# Patient Record
Sex: MALE | Race: WHITE | Employment: FULL TIME | URBAN - NONMETROPOLITAN AREA
[De-identification: names, ages, dates, MRNs, and addresses within clinical notes are randomized per-mention and may not be internally consistent; named-entity substitution may affect disease eponyms.]

---

## 2018-07-26 ENCOUNTER — HOSPITAL ENCOUNTER (INPATIENT)
Age: 53
LOS: 2 days | Discharge: HOME | DRG: 603 | End: 2018-07-28
Attending: FAMILY MEDICINE | Admitting: INTERNAL MEDICINE

## 2018-07-26 ENCOUNTER — APPOINTMENT (OUTPATIENT)
Dept: GENERAL RADIOLOGY | Age: 53
DRG: 603 | End: 2018-07-26

## 2018-07-26 ENCOUNTER — APPOINTMENT (OUTPATIENT)
Dept: INTERVENTIONAL RADIOLOGY/VASCULAR | Age: 53
DRG: 603 | End: 2018-07-26

## 2018-07-26 DIAGNOSIS — L03.90 CELLULITIS, UNSPECIFIED CELLULITIS SITE: Primary | ICD-10-CM

## 2018-07-26 PROBLEM — Z79.01 CHRONIC ANTICOAGULATION: Status: ACTIVE | Noted: 2018-07-26

## 2018-07-26 PROBLEM — Z95.828 PRESENCE OF IVC FILTER: Status: ACTIVE | Noted: 2018-07-26

## 2018-07-26 PROBLEM — I10 HTN (HYPERTENSION): Status: ACTIVE | Noted: 2018-07-26

## 2018-07-26 PROBLEM — R21 RASH: Status: ACTIVE | Noted: 2018-07-26

## 2018-07-26 LAB
ALBUMIN SERPL-MCNC: 3.9 G/DL (ref 3.5–5.1)
ALP BLD-CCNC: 38 U/L (ref 38–126)
ALT SERPL-CCNC: 24 U/L (ref 11–66)
AMPHETAMINE+METHAMPHETAMINE URINE SCREEN: NEGATIVE
ANION GAP SERPL CALCULATED.3IONS-SCNC: 13 MEQ/L (ref 8–16)
AST SERPL-CCNC: 21 U/L (ref 5–40)
BACTERIA: ABNORMAL
BARBITURATE QUANTITATIVE URINE: NEGATIVE
BASOPHILS # BLD: 0.7 %
BASOPHILS ABSOLUTE: 0 THOU/MM3 (ref 0–0.1)
BENZODIAZEPINE QUANTITATIVE URINE: NEGATIVE
BILIRUB SERPL-MCNC: 0.4 MG/DL (ref 0.3–1.2)
BILIRUBIN DIRECT: < 0.2 MG/DL (ref 0–0.3)
BILIRUBIN URINE: NEGATIVE
BLOOD, URINE: NEGATIVE
BUN BLDV-MCNC: 13 MG/DL (ref 7–22)
C-REACTIVE PROTEIN: 6.59 MG/DL (ref 0–1)
CALCIUM SERPL-MCNC: 9.6 MG/DL (ref 8.5–10.5)
CANNABINOID QUANTITATIVE URINE: NEGATIVE
CASTS: ABNORMAL /LPF
CASTS: ABNORMAL /LPF
CHARACTER, URINE: ABNORMAL
CHLORIDE BLD-SCNC: 100 MEQ/L (ref 98–111)
CO2: 28 MEQ/L (ref 23–33)
COCAINE METABOLITE QUANTITATIVE URINE: NEGATIVE
COLOR: YELLOW
CREAT SERPL-MCNC: 0.9 MG/DL (ref 0.4–1.2)
CRYSTALS: ABNORMAL
EOSINOPHIL # BLD: 6.1 %
EOSINOPHILS ABSOLUTE: 0.4 THOU/MM3 (ref 0–0.4)
EPITHELIAL CELLS, UA: ABNORMAL /HPF
ERYTHROCYTE [DISTWIDTH] IN BLOOD BY AUTOMATED COUNT: 12.1 % (ref 11.5–14.5)
ERYTHROCYTE [DISTWIDTH] IN BLOOD BY AUTOMATED COUNT: 39.1 FL (ref 35–45)
GFR SERPL CREATININE-BSD FRML MDRD: 88 ML/MIN/1.73M2
GLUCOSE BLD-MCNC: 147 MG/DL (ref 70–108)
GLUCOSE, URINE: 100 MG/DL
HAV IGM SER IA-ACNC: NEGATIVE
HCT VFR BLD CALC: 45.2 % (ref 42–52)
HEMOGLOBIN: 15.3 GM/DL (ref 14–18)
HEPATITIS B CORE IGM ANTIBODY: NEGATIVE
HEPATITIS B SURFACE ANTIGEN: NEGATIVE
HEPATITIS C ANTIBODY: NEGATIVE
IMMATURE GRANS (ABS): 0.01 THOU/MM3 (ref 0–0.07)
IMMATURE GRANULOCYTES: 0.2 %
INR BLD: 3.48 (ref 0.85–1.13)
KETONES, URINE: NEGATIVE
LACTIC ACID, SEPSIS: 1.7 MMOL/L (ref 0.5–1.9)
LEUKOCYTE EST, POC: NEGATIVE
LYMPHOCYTES # BLD: 18.7 %
LYMPHOCYTES ABSOLUTE: 1.1 THOU/MM3 (ref 1–4.8)
MCH RBC QN AUTO: 30 PG (ref 26–33)
MCHC RBC AUTO-ENTMCNC: 33.8 GM/DL (ref 32.2–35.5)
MCV RBC AUTO: 88.6 FL (ref 80–94)
MISCELLANEOUS LAB TEST RESULT: ABNORMAL
MONOCYTES # BLD: 6.6 %
MONOCYTES ABSOLUTE: 0.4 THOU/MM3 (ref 0.4–1.3)
NITRITE, URINE: NEGATIVE
NUCLEATED RED BLOOD CELLS: 0 /100 WBC
OPIATES, URINE: POSITIVE
OSMOLALITY CALCULATION: 284.1 MOSMOL/KG (ref 275–300)
OXYCODONE: NEGATIVE
PH UA: 7
PHENCYCLIDINE QUANTITATIVE URINE: NEGATIVE
PLATELET # BLD: 210 THOU/MM3 (ref 130–400)
PMV BLD AUTO: 10.4 FL (ref 9.4–12.4)
POTASSIUM SERPL-SCNC: 4.3 MEQ/L (ref 3.5–5.2)
PROCALCITONIN: 0.05 NG/ML (ref 0.01–0.09)
PROTEIN UA: NEGATIVE MG/DL
RBC # BLD: 5.1 MILL/MM3 (ref 4.7–6.1)
RBC URINE: ABNORMAL /HPF
RENAL EPITHELIAL, UA: ABNORMAL
SEDIMENTATION RATE, ERYTHROCYTE: 23 MM/HR (ref 0–10)
SEG NEUTROPHILS: 67.7 %
SEGMENTED NEUTROPHILS ABSOLUTE COUNT: 4.1 THOU/MM3 (ref 1.8–7.7)
SODIUM BLD-SCNC: 141 MEQ/L (ref 135–145)
SPECIFIC GRAVITY UA: 1.02 (ref 1–1.03)
TOTAL PROTEIN: 6.9 G/DL (ref 6.1–8)
UROBILINOGEN, URINE: 0.2 EU/DL
WBC # BLD: 6.1 THOU/MM3 (ref 4.8–10.8)
WBC UA: ABNORMAL /HPF
YEAST: ABNORMAL

## 2018-07-26 PROCEDURE — 2580000003 HC RX 258: Performed by: PHYSICIAN ASSISTANT

## 2018-07-26 PROCEDURE — 86160 COMPLEMENT ANTIGEN: CPT

## 2018-07-26 PROCEDURE — 82248 BILIRUBIN DIRECT: CPT

## 2018-07-26 PROCEDURE — 85651 RBC SED RATE NONAUTOMATED: CPT

## 2018-07-26 PROCEDURE — 2580000003 HC RX 258: Performed by: INTERNAL MEDICINE

## 2018-07-26 PROCEDURE — 99223 1ST HOSP IP/OBS HIGH 75: CPT | Performed by: INTERNAL MEDICINE

## 2018-07-26 PROCEDURE — 86255 FLUORESCENT ANTIBODY SCREEN: CPT

## 2018-07-26 PROCEDURE — 6370000000 HC RX 637 (ALT 250 FOR IP): Performed by: PHYSICIAN ASSISTANT

## 2018-07-26 PROCEDURE — 36415 COLL VENOUS BLD VENIPUNCTURE: CPT

## 2018-07-26 PROCEDURE — 6370000000 HC RX 637 (ALT 250 FOR IP): Performed by: INTERNAL MEDICINE

## 2018-07-26 PROCEDURE — 6360000002 HC RX W HCPCS: Performed by: PHYSICIAN ASSISTANT

## 2018-07-26 PROCEDURE — 1200000000 HC SEMI PRIVATE

## 2018-07-26 PROCEDURE — 87040 BLOOD CULTURE FOR BACTERIA: CPT

## 2018-07-26 PROCEDURE — 80307 DRUG TEST PRSMV CHEM ANLYZR: CPT

## 2018-07-26 PROCEDURE — 99285 EMERGENCY DEPT VISIT HI MDM: CPT

## 2018-07-26 PROCEDURE — 6360000002 HC RX W HCPCS: Performed by: INTERNAL MEDICINE

## 2018-07-26 PROCEDURE — 85610 PROTHROMBIN TIME: CPT

## 2018-07-26 PROCEDURE — 93971 EXTREMITY STUDY: CPT

## 2018-07-26 PROCEDURE — 86140 C-REACTIVE PROTEIN: CPT

## 2018-07-26 PROCEDURE — 2709999900 HC NON-CHARGEABLE SUPPLY

## 2018-07-26 PROCEDURE — 85025 COMPLETE CBC W/AUTO DIFF WBC: CPT

## 2018-07-26 PROCEDURE — 71046 X-RAY EXAM CHEST 2 VIEWS: CPT

## 2018-07-26 PROCEDURE — 80074 ACUTE HEPATITIS PANEL: CPT

## 2018-07-26 PROCEDURE — 80053 COMPREHEN METABOLIC PANEL: CPT

## 2018-07-26 PROCEDURE — 86038 ANTINUCLEAR ANTIBODIES: CPT

## 2018-07-26 PROCEDURE — 84145 PROCALCITONIN (PCT): CPT

## 2018-07-26 PROCEDURE — 83605 ASSAY OF LACTIC ACID: CPT

## 2018-07-26 PROCEDURE — 81001 URINALYSIS AUTO W/SCOPE: CPT

## 2018-07-26 RX ORDER — FENTANYL CITRATE 50 UG/ML
25 INJECTION, SOLUTION INTRAMUSCULAR; INTRAVENOUS
Status: DISCONTINUED | OUTPATIENT
Start: 2018-07-26 | End: 2018-07-26 | Stop reason: ALTCHOICE

## 2018-07-26 RX ORDER — SODIUM CHLORIDE 0.9 % (FLUSH) 0.9 %
10 SYRINGE (ML) INJECTION EVERY 12 HOURS SCHEDULED
Status: DISCONTINUED | OUTPATIENT
Start: 2018-07-26 | End: 2018-07-28 | Stop reason: HOSPADM

## 2018-07-26 RX ORDER — LOSARTAN POTASSIUM 25 MG/1
25 TABLET ORAL DAILY
Status: ON HOLD | COMMUNITY
End: 2020-01-01 | Stop reason: SDUPTHER

## 2018-07-26 RX ORDER — AMLODIPINE BESYLATE 2.5 MG/1
5 TABLET ORAL DAILY
Status: ON HOLD | COMMUNITY
End: 2020-01-01 | Stop reason: HOSPADM

## 2018-07-26 RX ORDER — DOCUSATE SODIUM 100 MG/1
100 CAPSULE, LIQUID FILLED ORAL 2 TIMES DAILY
Status: DISCONTINUED | OUTPATIENT
Start: 2018-07-26 | End: 2018-07-28 | Stop reason: HOSPADM

## 2018-07-26 RX ORDER — SODIUM CHLORIDE 0.9 % (FLUSH) 0.9 %
10 SYRINGE (ML) INJECTION PRN
Status: DISCONTINUED | OUTPATIENT
Start: 2018-07-26 | End: 2018-07-28 | Stop reason: HOSPADM

## 2018-07-26 RX ORDER — WARFARIN SODIUM 4 MG/1
4 TABLET ORAL DAILY
Status: DISCONTINUED | OUTPATIENT
Start: 2018-07-26 | End: 2018-07-26

## 2018-07-26 RX ORDER — LOSARTAN POTASSIUM 25 MG/1
25 TABLET ORAL DAILY
Status: DISCONTINUED | OUTPATIENT
Start: 2018-07-26 | End: 2018-07-28 | Stop reason: HOSPADM

## 2018-07-26 RX ORDER — ONDANSETRON 4 MG/1
4 TABLET, FILM COATED ORAL EVERY 6 HOURS PRN
Status: DISCONTINUED | OUTPATIENT
Start: 2018-07-26 | End: 2018-07-28 | Stop reason: HOSPADM

## 2018-07-26 RX ORDER — AMLODIPINE BESYLATE 5 MG/1
2.5 TABLET ORAL DAILY
Status: DISCONTINUED | OUTPATIENT
Start: 2018-07-26 | End: 2018-07-28 | Stop reason: HOSPADM

## 2018-07-26 RX ORDER — SODIUM CHLORIDE 9 MG/ML
INJECTION, SOLUTION INTRAVENOUS CONTINUOUS
Status: DISCONTINUED | OUTPATIENT
Start: 2018-07-26 | End: 2018-07-28 | Stop reason: HOSPADM

## 2018-07-26 RX ORDER — MULTIVIT-MIN/IRON/FOLIC ACID/K 18-600-40
2 CAPSULE ORAL DAILY
COMMUNITY

## 2018-07-26 RX ORDER — WARFARIN SODIUM 3 MG/1
3 TABLET ORAL ONCE
Status: COMPLETED | OUTPATIENT
Start: 2018-07-26 | End: 2018-07-26

## 2018-07-26 RX ORDER — WARFARIN SODIUM 4 MG/1
4 TABLET ORAL DAILY
COMMUNITY

## 2018-07-26 RX ORDER — HYDROCODONE BITARTRATE AND ACETAMINOPHEN 5; 325 MG/1; MG/1
1 TABLET ORAL ONCE
Status: COMPLETED | OUTPATIENT
Start: 2018-07-26 | End: 2018-07-26

## 2018-07-26 RX ORDER — ONDANSETRON 2 MG/ML
4 INJECTION INTRAMUSCULAR; INTRAVENOUS EVERY 6 HOURS PRN
Status: DISCONTINUED | OUTPATIENT
Start: 2018-07-26 | End: 2018-07-26 | Stop reason: RX

## 2018-07-26 RX ADMIN — VANCOMYCIN HYDROCHLORIDE 1000 MG: 1 INJECTION, POWDER, LYOPHILIZED, FOR SOLUTION INTRAVENOUS at 11:12

## 2018-07-26 RX ADMIN — FENTANYL CITRATE 25 MCG: 50 INJECTION, SOLUTION INTRAMUSCULAR; INTRAVENOUS at 15:56

## 2018-07-26 RX ADMIN — HYDROMORPHONE HYDROCHLORIDE 0.5 MG: 1 INJECTION, SOLUTION INTRAMUSCULAR; INTRAVENOUS; SUBCUTANEOUS at 21:39

## 2018-07-26 RX ADMIN — FENTANYL CITRATE 25 MCG: 50 INJECTION, SOLUTION INTRAMUSCULAR; INTRAVENOUS at 17:42

## 2018-07-26 RX ADMIN — Medication 2 G: at 18:06

## 2018-07-26 RX ADMIN — DOCUSATE SODIUM 100 MG: 100 CAPSULE, LIQUID FILLED ORAL at 21:07

## 2018-07-26 RX ADMIN — HYDROCODONE BITARTRATE AND ACETAMINOPHEN 1 TABLET: 5; 325 TABLET ORAL at 10:56

## 2018-07-26 RX ADMIN — WARFARIN SODIUM 3 MG: 3 TABLET ORAL at 17:39

## 2018-07-26 RX ADMIN — SODIUM CHLORIDE: 9 INJECTION, SOLUTION INTRAVENOUS at 13:23

## 2018-07-26 RX ADMIN — FENTANYL CITRATE 25 MCG: 50 INJECTION, SOLUTION INTRAMUSCULAR; INTRAVENOUS at 19:25

## 2018-07-26 RX ADMIN — AMLODIPINE BESYLATE 2.5 MG: 5 TABLET ORAL at 15:56

## 2018-07-26 RX ADMIN — CEFTRIAXONE SODIUM 1 G: 1 INJECTION, POWDER, FOR SOLUTION INTRAMUSCULAR; INTRAVENOUS at 10:40

## 2018-07-26 RX ADMIN — LOSARTAN POTASSIUM 25 MG: 25 TABLET, FILM COATED ORAL at 15:56

## 2018-07-26 ASSESSMENT — PAIN DESCRIPTION - LOCATION
LOCATION: ANKLE
LOCATION: LEG
LOCATION: ANKLE

## 2018-07-26 ASSESSMENT — PAIN DESCRIPTION - DESCRIPTORS
DESCRIPTORS: CONSTANT
DESCRIPTORS: ACHING;BURNING;CONSTANT
DESCRIPTORS: CONSTANT
DESCRIPTORS: SHARP
DESCRIPTORS: CONSTANT;BURNING;ACHING
DESCRIPTORS: CONSTANT
DESCRIPTORS: CONSTANT

## 2018-07-26 ASSESSMENT — PAIN SCALES - GENERAL
PAINLEVEL_OUTOF10: 6
PAINLEVEL_OUTOF10: 10
PAINLEVEL_OUTOF10: 6
PAINLEVEL_OUTOF10: 7
PAINLEVEL_OUTOF10: 7
PAINLEVEL_OUTOF10: 5
PAINLEVEL_OUTOF10: 10
PAINLEVEL_OUTOF10: 8
PAINLEVEL_OUTOF10: 7
PAINLEVEL_OUTOF10: 7
PAINLEVEL_OUTOF10: 6
PAINLEVEL_OUTOF10: 7

## 2018-07-26 ASSESSMENT — ENCOUNTER SYMPTOMS
VOMITING: 0
ABDOMINAL PAIN: 0
EYE DISCHARGE: 0
WHEEZING: 0
BACK PAIN: 0
RHINORRHEA: 0
SHORTNESS OF BREATH: 0
SORE THROAT: 0
DIARRHEA: 0
EYE REDNESS: 0
COUGH: 0
NAUSEA: 0

## 2018-07-26 ASSESSMENT — PAIN DESCRIPTION - ORIENTATION
ORIENTATION: LEFT

## 2018-07-26 ASSESSMENT — PAIN DESCRIPTION - PAIN TYPE
TYPE: ACUTE PAIN

## 2018-07-26 ASSESSMENT — PAIN DESCRIPTION - FREQUENCY: FREQUENCY: INTERMITTENT

## 2018-07-26 ASSESSMENT — PAIN DESCRIPTION - PROGRESSION: CLINICAL_PROGRESSION: GRADUALLY WORSENING
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
## 2018-07-26 NOTE — ED NOTES
Report called to Mani Marques, [address] [place] [address], RN  07/26/18 300 Eren Alanis, RN  07/26/18 7488

## 2018-07-26 NOTE — PROGRESS NOTES
Pt arrived in 6e54  1 in a wheelchair. Complaints: left ankle pain and swelling. IV vancomycin infusing into the antecubital left, condition patent and no redness, bag completed at this time.   Placido Dee

## 2018-07-26 NOTE — ED PROVIDER NOTES
[facility]  eMERGENCY dEPARTMENT eNCOUnter          CHIEF COMPLAINT       Chief Complaint   Patient presents with    Leg Swelling     left lower leg redness & swelling. Nurses Notes reviewed and I agree except as noted in the HPI. HISTORY OF PRESENT ILLNESS    Charlie Wakefield is a 52 y.o. male who presents to the Emergency Department for the evaluation of left lower extremity swelling and redness. The patient explains that his leg started to swell approximately a month ago but it has increased in severity. He reports that he has not seen any one for this but he is on coumadin. Patient has noted left leg swelling, redness, and a large open wound on his medial left ankle that radiates up his left medial leg. Patient is reporting pain to the left lower leg that feels like tingling pain with intermittent sharp pains. He rates this pain as a 10/10 in severity. Patient does have a history of DVT in the left leg that ran from his left ankle and up to his left groin. Patient denies any fevers, chills, nausea, vomiting or diarrhea. Patient denies any chest pain or shortness of breath. He is allergic to morphine but has no other pertinent past medical history. Patient has no further complaints during initial evaluation. The HPI was provided by the patient. REVIEW OF SYSTEMS     Review of Systems   Constitutional: Negative for appetite change, chills, fatigue and fever. HENT: Negative for congestion, ear pain, rhinorrhea and sore throat. Eyes: Negative for discharge, redness and visual disturbance. Respiratory: Negative for cough, shortness of breath and wheezing. Cardiovascular: Positive for leg swelling (left lower extremity redness and swelling). Negative for chest pain and palpitations. Gastrointestinal: Negative for abdominal pain, diarrhea, nausea and vomiting. Genitourinary: Negative for decreased urine volume, difficulty urinating, dysuria and hematuria. Musculoskeletal: Negative for arthralgias, back pain, joint swelling and neck pain. Skin: Negative for pallor and rash. Allergic/Immunologic: Negative for environmental allergies. Neurological: Negative for dizziness, syncope, weakness, light-headedness, numbness and headaches. Hematological: Negative for adenopathy. Psychiatric/Behavioral: Negative for confusion and suicidal ideas. The patient is not nervous/anxious. PAST MEDICAL HISTORY    has no past medical history on file. SURGICAL HISTORY      has no past surgical history on file. CURRENT MEDICATIONS       Previous Medications    AMLODIPINE (NORVASC) 2.5 MG TABLET    Take 2.5 mg by mouth daily    LOSARTAN (COZAAR) 25 MG TABLET    Take 25 mg by mouth daily    WARFARIN (COUMADIN) 4 MG TABLET    Take 4 mg by mouth daily       ALLERGIES     is allergic to morphine. FAMILY HISTORY     has no family status information on file. family history is not on file. SOCIAL HISTORY          PHYSICAL EXAM     INITIAL VITALS:  height is 6' 2\" (1.88 m) and weight is 250 lb (113.4 kg). His oral temperature is 98.3 °F (36.8 °C). His blood pressure is 133/90 (abnormal) and his pulse is 71. His respiration is 16 and oxygen saturation is 97%. Physical Exam   Constitutional: He is oriented to person, place, and time. He appears well-developed and well-nourished. Non-toxic appearance. HENT:   Head: Normocephalic and atraumatic. Right Ear: Tympanic membrane and external ear normal.   Left Ear: Tympanic membrane and external ear normal.   Nose: Nose normal.   Mouth/Throat: Oropharynx is clear and moist and mucous membranes are normal. No oropharyngeal exudate, posterior oropharyngeal edema or posterior oropharyngeal erythema. Eyes: Conjunctivae and EOM are normal.   Neck: Normal range of motion. Neck supple. No JVD present. Cardiovascular: Normal rate, regular rhythm, normal heart sounds, intact distal pulses and normal pulses.   Exam reveals GLOMERULAR FILTRATION RATE, ESTIMATED - Abnormal; Notable for the following:     Est, Glom Filt Rate 88 (*)     All other components within normal limits   CULTURE BLOOD #1   CULTURE BLOOD #2   CBC WITH AUTO DIFFERENTIAL   LACTATE, SEPSIS   PROCALCITONIN   ANION GAP   OSMOLALITY   RUTH SCREEN WITH REFLEX   ANTI-NEUTROPHILIC CYTOPLASMIC ANTIBODY   C3 COMPLEMENT       EMERGENCY DEPARTMENT COURSE:   Vitals:    Vitals:    07/26/18 0726 07/26/18 1036   BP: (!) 152/97 (!) 133/90   Pulse: 74 71   Resp:  16   Temp: 98.3 °F (36.8 °C)    TempSrc: Oral    SpO2: 97% 97%   Weight:  250 lb (113.4 kg)   Height:  6' 2\" (1.88 m)     7:28 AM:  The patient reported for evaluation of Cellulitis. His laboratories were unremarkable however his cellulitis is quite extensive and circumferential.  He currently has no family doctor and just moved here. I'm concerned about follow-up for him. I did give vancomycin and Rocephin here. I do not feel he would do well as an outpatient. I did discuss case with the hospitalist is going to admit the patient. CRITICAL CARE:   None     CONSULTS:    Dr Aggarwal Seat:  None    FINAL IMPRESSION      1. Cellulitis, unspecified cellulitis site          DISPOSITION/PLAN      admit       PATIENT REFERRED TO:  No follow-up provider specified. DISCHARGE MEDICATIONS:  New Prescriptions    No medications on file       (Please note that portions of this note were completed with a voice recognition program.  Efforts were made to edit the dictations but occasionally words are mis-transcribed.)      This patient was seen independently by this Mid-Level Provider in the [facility] Emergency Department. The patient was given an opportunity to see the Emergency Attending. The patient voiced understanding that I was a Mid-Level Provider and was in agreement with being seen independently by myself.     Scribe:  Enrique Fuentes 07/26/18 7:28 AM Scribing for and in the presence of Etta Joe ROZINA.    Provider:  I personally performed the services described in the documentation, reviewed and edited the documentation which was dictated to the scribe in my presence, and it accurately records my words and actions.     Grace Zaldivar PA-C 07/26/18 11:41 AM                    Juanna Schaumann, PA  07/26/18 [id number]

## 2018-07-27 LAB
ANION GAP SERPL CALCULATED.3IONS-SCNC: 11 MEQ/L (ref 8–16)
BUN BLDV-MCNC: 12 MG/DL (ref 7–22)
C3 COMPLEMENT: 167 MG/DL (ref 88–201)
CALCIUM SERPL-MCNC: 9.1 MG/DL (ref 8.5–10.5)
CHLORIDE BLD-SCNC: 102 MEQ/L (ref 98–111)
CO2: 26 MEQ/L (ref 23–33)
CREAT SERPL-MCNC: 1 MG/DL (ref 0.4–1.2)
ERYTHROCYTE [DISTWIDTH] IN BLOOD BY AUTOMATED COUNT: 12.4 % (ref 11.5–14.5)
ERYTHROCYTE [DISTWIDTH] IN BLOOD BY AUTOMATED COUNT: 40.4 FL (ref 35–45)
GFR SERPL CREATININE-BSD FRML MDRD: 78 ML/MIN/1.73M2
GLUCOSE BLD-MCNC: 99 MG/DL (ref 70–108)
HCT VFR BLD CALC: 41.7 % (ref 42–52)
HEMOGLOBIN: 13.9 GM/DL (ref 14–18)
INR BLD: 2.94 (ref 0.85–1.13)
MCH RBC QN AUTO: 29.7 PG (ref 26–33)
MCHC RBC AUTO-ENTMCNC: 33.3 GM/DL (ref 32.2–35.5)
MCV RBC AUTO: 89.1 FL (ref 80–94)
PLATELET # BLD: 201 THOU/MM3 (ref 130–400)
PMV BLD AUTO: 10.5 FL (ref 9.4–12.4)
POTASSIUM REFLEX MAGNESIUM: 4.3 MEQ/L (ref 3.5–5.2)
RBC # BLD: 4.68 MILL/MM3 (ref 4.7–6.1)
SODIUM BLD-SCNC: 139 MEQ/L (ref 135–145)
WBC # BLD: 6.1 THOU/MM3 (ref 4.8–10.8)

## 2018-07-27 PROCEDURE — 2580000003 HC RX 258: Performed by: INTERNAL MEDICINE

## 2018-07-27 PROCEDURE — 99232 SBSQ HOSP IP/OBS MODERATE 35: CPT | Performed by: INTERNAL MEDICINE

## 2018-07-27 PROCEDURE — 6370000000 HC RX 637 (ALT 250 FOR IP): Performed by: PHARMACIST

## 2018-07-27 PROCEDURE — 2709999900 HC NON-CHARGEABLE SUPPLY

## 2018-07-27 PROCEDURE — 85610 PROTHROMBIN TIME: CPT

## 2018-07-27 PROCEDURE — 1200000000 HC SEMI PRIVATE

## 2018-07-27 PROCEDURE — 6370000000 HC RX 637 (ALT 250 FOR IP): Performed by: INTERNAL MEDICINE

## 2018-07-27 PROCEDURE — 36415 COLL VENOUS BLD VENIPUNCTURE: CPT

## 2018-07-27 PROCEDURE — 85027 COMPLETE CBC AUTOMATED: CPT

## 2018-07-27 PROCEDURE — 6360000002 HC RX W HCPCS: Performed by: INTERNAL MEDICINE

## 2018-07-27 PROCEDURE — 80048 BASIC METABOLIC PNL TOTAL CA: CPT

## 2018-07-27 RX ORDER — ACETAMINOPHEN 500 MG
1000 TABLET ORAL EVERY 6 HOURS PRN
Status: DISCONTINUED | OUTPATIENT
Start: 2018-07-27 | End: 2018-07-28 | Stop reason: HOSPADM

## 2018-07-27 RX ORDER — IBUPROFEN 400 MG/1
400 TABLET ORAL EVERY 6 HOURS PRN
Status: DISCONTINUED | OUTPATIENT
Start: 2018-07-27 | End: 2018-07-28 | Stop reason: HOSPADM

## 2018-07-27 RX ORDER — WARFARIN SODIUM 4 MG/1
4 TABLET ORAL ONCE
Status: COMPLETED | OUTPATIENT
Start: 2018-07-27 | End: 2018-07-27

## 2018-07-27 RX ORDER — IBUPROFEN 400 MG/1
400 TABLET ORAL EVERY 6 HOURS PRN
Status: DISCONTINUED | OUTPATIENT
Start: 2018-07-27 | End: 2018-07-27

## 2018-07-27 RX ADMIN — WARFARIN SODIUM 4 MG: 4 TABLET ORAL at 17:59

## 2018-07-27 RX ADMIN — Medication 2 G: at 17:59

## 2018-07-27 RX ADMIN — HYDROMORPHONE HYDROCHLORIDE 0.5 MG: 1 INJECTION, SOLUTION INTRAMUSCULAR; INTRAVENOUS; SUBCUTANEOUS at 00:46

## 2018-07-27 RX ADMIN — SODIUM CHLORIDE: 9 INJECTION, SOLUTION INTRAVENOUS at 15:54

## 2018-07-27 RX ADMIN — Medication 2 G: at 01:38

## 2018-07-27 RX ADMIN — SODIUM CHLORIDE: 9 INJECTION, SOLUTION INTRAVENOUS at 00:46

## 2018-07-27 RX ADMIN — LOSARTAN POTASSIUM 25 MG: 25 TABLET, FILM COATED ORAL at 08:12

## 2018-07-27 RX ADMIN — ACETAMINOPHEN 1000 MG: 500 TABLET, FILM COATED ORAL at 19:58

## 2018-07-27 RX ADMIN — DOCUSATE SODIUM 100 MG: 100 CAPSULE, LIQUID FILLED ORAL at 08:12

## 2018-07-27 RX ADMIN — HYDROMORPHONE HYDROCHLORIDE 0.5 MG: 1 INJECTION, SOLUTION INTRAMUSCULAR; INTRAVENOUS; SUBCUTANEOUS at 08:13

## 2018-07-27 RX ADMIN — HYDROMORPHONE HYDROCHLORIDE 0.5 MG: 1 INJECTION, SOLUTION INTRAMUSCULAR; INTRAVENOUS; SUBCUTANEOUS at 03:46

## 2018-07-27 RX ADMIN — Medication 2 G: at 10:50

## 2018-07-27 RX ADMIN — AMLODIPINE BESYLATE 2.5 MG: 5 TABLET ORAL at 08:12

## 2018-07-27 ASSESSMENT — PAIN SCALES - GENERAL
PAINLEVEL_OUTOF10: 8
PAINLEVEL_OUTOF10: 0
PAINLEVEL_OUTOF10: 7
PAINLEVEL_OUTOF10: 8
PAINLEVEL_OUTOF10: 6
PAINLEVEL_OUTOF10: 7
PAINLEVEL_OUTOF10: 3
PAINLEVEL_OUTOF10: 7
PAINLEVEL_OUTOF10: 0

## 2018-07-27 ASSESSMENT — PAIN DESCRIPTION - LOCATION
LOCATION: ANKLE
LOCATION: ANKLE;LEG
LOCATION: ANKLE;LEG
LOCATION: LEG;ANKLE

## 2018-07-27 ASSESSMENT — PAIN DESCRIPTION - ORIENTATION
ORIENTATION: LEFT

## 2018-07-27 ASSESSMENT — PAIN DESCRIPTION - PAIN TYPE
TYPE: ACUTE PAIN

## 2018-07-27 ASSESSMENT — PAIN DESCRIPTION - DESCRIPTORS
DESCRIPTORS: BURNING;ACHING
DESCRIPTORS: ACHING;BURNING;CONSTANT

## 2018-07-27 ASSESSMENT — PAIN DESCRIPTION - FREQUENCY: FREQUENCY: INTERMITTENT

## 2018-07-27 NOTE — CONSULTS
hyperlipidemia, hypertension,  obesity, chronic embolism, and thrombosis of vein and obstructive sleep  apnea. PAST SURGICAL HISTORY:  Surgeries include colonoscopy, polypectomy,  cholecystectomy, septoplasty, and thrombectomy. FAMILY HISTORY:  His father has had colorectal cancer and hypertension. Mother had hypertension. SOCIAL HISTORY:  He is a former smoker. He quit in 2009. Occasionally  drinks alcohol. He is . He is up to date with his vaccinations. REVIEW OF SYSTEMS:  He has no headache or blurring of vision. He has no  cough or chest pain. No abdominal pain. Musculoskeletal, as noted above. PHYSICAL EXAMINATION:  GENERAL:  On examination, he looks comfortable. VITAL SIGNS:  Temperature 98, respirations 20, pulse 60, blood pressure  118/69. HEENT:  He has pink conjunctivae, anicteric sclerae. CHEST:  Bilateral air entry. CARDIOVASCULAR SYSTEM:  Regular. He has a split second heart sound, did  not widen during breathing. ABDOMEN:  Soft. EXTREMITIES:  He has chronic stasis changes. The left leg is red and  swollen. It was hot and tender to touch. Pulses were palpable. CNS:  He is conscious, oriented to person, place, and time. DIAGNOSTIC STUDIES:  Urine screen was positive for opiate. Albumin was  3.9, alkaline phosphatase 38, ALT 24, and AST 21. WBC of 6.1, hemoglobin  15.3, hematocrit 45.2, and platelets of 250. Sedimentation rate was 23. IMPRESSION:  Left leg cellulitis, chronic status dermatitis related to  blood clot, history of DVT with paradoxical embolization, had Belkys  filter. RECOMMENDATION:  The patient will be placed on IV Ancef 2 gm every 8 hours. We will apply Kerlix, Ace wrap, and Coban to the left leg from the foot up  to knee high. We will continue to follow patient.         Elenita Wang M.D.    D: 07/26/2018 17:24:18       T: 07/26/2018 18:33:50     [email or web address]  Job#: [id number]     Doc#: [id number]    CC:

## 2018-07-27 NOTE — PROGRESS NOTES
Progress note: Infectious diseases    Patient - Natalia Villegas,  Age - 52 y.o.    - 1965      Room Number - 6E-54/054-A   MRN -  [id number]   Acct # - [de-identified]  Date of Admission -  2018  7:20 AM    SUBJECTIVE:   He feels better, the pain is improved  The leg is wrapped. OBJECTIVE   VITALS    height is 6' 2\" (1.88 m) and weight is 257 lb (116.6 kg). His oral temperature is 97.8 °F (36.6 °C). His blood pressure is 157/88 (abnormal) and his pulse is 88. His respiration is 16 and oxygen saturation is 100%.        Wt Readings from Last 3 Encounters:   18 257 lb (116.6 kg)       I/O (24 Hours)    Intake/Output Summary (Last 24 hours) at 18 1354  Last data filed at 18 0531   Gross per 24 hour   Intake          2308.31 ml   Output             1100 ml   Net          1208.31 ml       General Appearance  Awake, alert, oriented,   HEENT - normocephalic, atraumatic, pink conjunctiva,  anicteric sclera  Neck - Supple, no mass  Lungs -  Bilateral air  air entry, no rhonchi, no wheeze  Cardiovascular - Heart sounds are normal.   Abdomen - soft, not distended, nontender,   Neurologic -oriented  Skin - No bruising or bleeding  Extremities - No edema, no cyanosis, clubbing   wrapped  MEDICATIONS:      warfarin  4 mg Oral Once    sodium chloride flush  10 mL Intravenous 2 times per day    docusate sodium  100 mg Oral BID    amLODIPine  2.5 mg Oral Daily    losartan  25 mg Oral Daily    warfarin (COUMADIN) daily dosing (placeholder)   Other RX Placeholder    ceFAZolin  2 g Intravenous Q8H      sodium chloride 75 mL/hr at 18 0046     sodium chloride flush, ondansetron, HYDROmorphone      LABS:     CBC:   Recent Labs      18   0740  18   07   WBC  6.1  6.1   HGB  15.3  13.9*   PLT  210  201     BMP:  Recent Labs      18   0740  18   07   NA  141  139   K  4.3  4.3   CL  100 102   CO2  28  26   BUN  13  12   CREATININE  0.9  1.0   GLUCOSE  147*  99     Calcium:  Recent Labs      07/27/18   0722   CALCIUM  9.1     Ionized Calcium:No results for input(s): IONCA in the last 72 hours. Magnesium:No results for input(s): MG in the last 72 hours. Phosphorus:No results for input(s): PHOS in the last 72 hours. BNP:No results for input(s): BNP in the last 72 hours. Glucose:No results for input(s): POCGLU in the last 72 hours. HgbA1C: No results for input(s): LABA1C in the last 72 hours. INR:   Recent Labs      07/26/18   0740  07/27/18   0722   INR  3.48*  2.94*     Hepatic: Recent Labs      07/26/18   1147   ALKPHOS  38   ALT  24   AST  21   PROT  6.9   BILITOT  0.4   BILIDIR  <0.2   LABALBU  3.9     Amylase and Lipase:No results for input(s): LACTA, AMYLASE in the last 72 hours. Lactic Acid: No results for input(s): LACTA in the last 72 hours. Troponin: No results for input(s): CKTOTAL, CKMB, TROPONINI in the last 72 hours. BNP: No results for input(s): BNP in the last 72 hours.     CULTURES:   UA: Recent Labs      07/26/18   1550   SPECGRAV  1.025   PHUR  7.0   COLORU  YELLOW   PROTEINU  NEGATIVE   BLOODU  NEGATIVE   RBCUA  3-5   WBCUA  NONE SEEN   BACTERIA  NONE   NITRU  NEGATIVE   BILIRUBINUR  NEGATIVE   UROBILINOGEN  0.2   KETUA  NEGATIVE   LABCAST  NONE SEEN  NONE SEEN     Micro:   Lab Results   Component Value Date    BC No growth-preliminary 07/26/2018         IMAGING:         Problem list of patient:     Patient Active Problem List   Diagnosis Code    Cellulitis L03.90    Rash R21    HTN (hypertension) I10    Chronic anticoagulation Z79.01    Presence of IVC filter V43.971         ASSESSMENT/PLAN   Left leg cellulites  Chronic venostasis dermatites  Continue current treatment  Discharge plan at am with oral keflex for one wk  Follow up at the wound clinic in one wk      Titi Ramirez MD, FACP 7/27/2018 1:54 PM

## 2018-07-27 NOTE — PROGRESS NOTES
Already on Anticoagulation     Disposition:    [] Home       [] TCU       [] Rehab       [] Psych       [] SNF       [] [facility]       [] Other-    Code Status: Full Code    PT/OT Eval Status:   Assessment/Plan:    Anticipated Discharge in : 1-2 days    Active Hospital Problems    Diagnosis Date Noted    Cellulitis [L03.90] 07/26/2018    Rash [R21] 07/26/2018    HTN (hypertension) [I10] 07/26/2018    Chronic anticoagulation [Z79.01] 07/26/2018    Presence of IVC filter [Z95.828] 07/26/2018       1. Cellulitis: Antibiotics per ID  2. Chronic anticoagulation: Pharm managing INR  3. Pain: counseled. Pt with hx of addiction, No dilaudid.   4. Continue all other mgt        Electronically signed by Sarika Coe MD on 7/27/2018 at 5:12 PM

## 2018-07-28 VITALS
RESPIRATION RATE: 18 BRPM | TEMPERATURE: 97.9 F | OXYGEN SATURATION: 93 % | SYSTOLIC BLOOD PRESSURE: 136 MMHG | DIASTOLIC BLOOD PRESSURE: 83 MMHG | WEIGHT: 257 LBS | BODY MASS INDEX: 32.98 KG/M2 | HEART RATE: 66 BPM | HEIGHT: 74 IN

## 2018-07-28 LAB
ANA SCREEN: NORMAL
ANION GAP SERPL CALCULATED.3IONS-SCNC: 16 MEQ/L (ref 8–16)
BASOPHILS # BLD: 0.8 %
BASOPHILS ABSOLUTE: 0 THOU/MM3 (ref 0–0.1)
BUN BLDV-MCNC: 10 MG/DL (ref 7–22)
CALCIUM SERPL-MCNC: 9.5 MG/DL (ref 8.5–10.5)
CHLORIDE BLD-SCNC: 104 MEQ/L (ref 98–111)
CO2: 20 MEQ/L (ref 23–33)
CREAT SERPL-MCNC: 1 MG/DL (ref 0.4–1.2)
EOSINOPHIL # BLD: 4.3 %
EOSINOPHILS ABSOLUTE: 0.2 THOU/MM3 (ref 0–0.4)
ERYTHROCYTE [DISTWIDTH] IN BLOOD BY AUTOMATED COUNT: 12.1 % (ref 11.5–14.5)
ERYTHROCYTE [DISTWIDTH] IN BLOOD BY AUTOMATED COUNT: 39.4 FL (ref 35–45)
GFR SERPL CREATININE-BSD FRML MDRD: 78 ML/MIN/1.73M2
GLUCOSE BLD-MCNC: 151 MG/DL (ref 70–108)
HCT VFR BLD CALC: 43.9 % (ref 42–52)
HEMOGLOBIN: 14.8 GM/DL (ref 14–18)
IMMATURE GRANS (ABS): 0.02 THOU/MM3 (ref 0–0.07)
IMMATURE GRANULOCYTES: 0.4 %
INR BLD: 2.41 (ref 0.85–1.13)
LYMPHOCYTES # BLD: 22.5 %
LYMPHOCYTES ABSOLUTE: 1.1 THOU/MM3 (ref 1–4.8)
MCH RBC QN AUTO: 30 PG (ref 26–33)
MCHC RBC AUTO-ENTMCNC: 33.7 GM/DL (ref 32.2–35.5)
MCV RBC AUTO: 89 FL (ref 80–94)
MONOCYTES # BLD: 6 %
MONOCYTES ABSOLUTE: 0.3 THOU/MM3 (ref 0.4–1.3)
NEUTROPHIL CYTOPLASMIC AB IGG: < 1
NUCLEATED RED BLOOD CELLS: 0 /100 WBC
PLATELET # BLD: 214 THOU/MM3 (ref 130–400)
PMV BLD AUTO: 10 FL (ref 9.4–12.4)
POTASSIUM SERPL-SCNC: 4.2 MEQ/L (ref 3.5–5.2)
RBC # BLD: 4.93 MILL/MM3 (ref 4.7–6.1)
SEG NEUTROPHILS: 66 %
SEGMENTED NEUTROPHILS ABSOLUTE COUNT: 3.2 THOU/MM3 (ref 1.8–7.7)
SODIUM BLD-SCNC: 140 MEQ/L (ref 135–145)
WBC # BLD: 4.8 THOU/MM3 (ref 4.8–10.8)

## 2018-07-28 PROCEDURE — 99239 HOSP IP/OBS DSCHRG MGMT >30: CPT | Performed by: INTERNAL MEDICINE

## 2018-07-28 PROCEDURE — 6360000002 HC RX W HCPCS: Performed by: INTERNAL MEDICINE

## 2018-07-28 PROCEDURE — 2709999900 HC NON-CHARGEABLE SUPPLY

## 2018-07-28 PROCEDURE — 36415 COLL VENOUS BLD VENIPUNCTURE: CPT

## 2018-07-28 PROCEDURE — 2580000003 HC RX 258: Performed by: INTERNAL MEDICINE

## 2018-07-28 PROCEDURE — 85610 PROTHROMBIN TIME: CPT

## 2018-07-28 PROCEDURE — 85025 COMPLETE CBC W/AUTO DIFF WBC: CPT

## 2018-07-28 PROCEDURE — 6370000000 HC RX 637 (ALT 250 FOR IP): Performed by: INTERNAL MEDICINE

## 2018-07-28 PROCEDURE — 80048 BASIC METABOLIC PNL TOTAL CA: CPT

## 2018-07-28 RX ORDER — IBUPROFEN 400 MG/1
400 TABLET ORAL EVERY 6 HOURS PRN
Qty: 120 TABLET | Refills: 0 | Status: ON HOLD | OUTPATIENT
Start: 2018-07-28 | End: 2019-03-04 | Stop reason: HOSPADM

## 2018-07-28 RX ORDER — CEPHALEXIN 500 MG/1
500 CAPSULE ORAL 4 TIMES DAILY
Qty: 40 CAPSULE | Refills: 0 | Status: SHIPPED | OUTPATIENT
Start: 2018-07-28 | End: 2018-08-07

## 2018-07-28 RX ORDER — WARFARIN SODIUM 3 MG/1
6 TABLET ORAL
Status: DISCONTINUED | OUTPATIENT
Start: 2018-07-28 | End: 2018-07-28 | Stop reason: HOSPADM

## 2018-07-28 RX ADMIN — LOSARTAN POTASSIUM 25 MG: 25 TABLET, FILM COATED ORAL at 08:25

## 2018-07-28 RX ADMIN — Medication 2 G: at 02:47

## 2018-07-28 RX ADMIN — AMLODIPINE BESYLATE 2.5 MG: 5 TABLET ORAL at 08:25

## 2018-07-28 RX ADMIN — ACETAMINOPHEN 1000 MG: 500 TABLET, FILM COATED ORAL at 08:25

## 2018-07-28 RX ADMIN — DOCUSATE SODIUM 100 MG: 100 CAPSULE, LIQUID FILLED ORAL at 08:25

## 2018-07-28 RX ADMIN — Medication 2 G: at 10:37

## 2018-07-28 ASSESSMENT — PAIN DESCRIPTION - ONSET: ONSET: ON-GOING

## 2018-07-28 ASSESSMENT — PAIN DESCRIPTION - LOCATION: LOCATION: ANKLE;LEG

## 2018-07-28 ASSESSMENT — PAIN DESCRIPTION - PROGRESSION: CLINICAL_PROGRESSION: GRADUALLY IMPROVING

## 2018-07-28 ASSESSMENT — PAIN DESCRIPTION - DESCRIPTORS: DESCRIPTORS: ACHING;DISCOMFORT

## 2018-07-28 ASSESSMENT — PAIN DESCRIPTION - FREQUENCY: FREQUENCY: INTERMITTENT

## 2018-07-28 ASSESSMENT — PAIN DESCRIPTION - ORIENTATION: ORIENTATION: LEFT

## 2018-07-28 ASSESSMENT — PAIN SCALES - GENERAL
PAINLEVEL_OUTOF10: 8
PAINLEVEL_OUTOF10: 8

## 2018-07-28 ASSESSMENT — PAIN DESCRIPTION - PAIN TYPE: TYPE: ACUTE PAIN

## 2018-07-28 NOTE — DISCHARGE SUMMARY
Hospital Medicine Discharge Summary      Patient Identification:   Gabriella Garg   : 1965  MRN: [id number]   Account: [de-identified]      Patient's PCP: No primary care provider on file. Admit Date: 2018     Discharge Date: 2018      Admitting Physician: Gayathri Mckay MD     Discharge Physician: Gayathri Mckay MD     Discharge Diagnoses: Active Hospital Problems    Diagnosis Date Noted    Cellulitis [L03.90] 2018    Rash [R21] 2018    HTN (hypertension) [I10] 2018    Chronic anticoagulation [Z79.01] 2018    Presence of IVC filter [Z95.828] 2018       The patient was seen and examined on day of discharge and this discharge summary is in conjunction with any daily progress note from day of discharge. Hospital Course:   Gabriella Garg is a 52 y.o. male admitted to [address] [place] [address] on 2018 for LLE erythema and swelling. Pt was placed on antibiotics and ace wraps per ID. Pt notes some improvement. Does not have a pcp here. Will f/u at wound clinic. Will complete antibiotics . NGTD . Chronic conditions were monitored and treated  See progress note    Exam:     Vitals:  Vitals:    18 2347 18 0407 18 0815 18 1150   BP: (!) 141/83 (!) 142/85 (!) 145/80 136/83   Pulse: 67 56 71 66   Resp: 18  18   Temp: 98.3 °F (36.8 °C) 98.6 °F (37 °C) 98.8 °F (37.1 °C) 97.9 °F (36.6 °C)   TempSrc: Oral Oral Oral Oral   SpO2: 92% 95% 95% 93%   Weight:       Height:         Weight: Weight: 257 lb (116.6 kg)     24 hour intake/output:  Intake/Output Summary (Last 24 hours) at 18 1556  Last data filed at 18 0828   Gross per 24 hour   Intake          1260.92 ml   Output             3400 ml   Net         -2139.08 ml                Labs:  For convenience and continuity at follow-up the following most recent labs are provided:      CBC:    Lab Results   Component Value Date    WBC 4.8 2018    HGB 14.8 2018    HCT 43.9 07/28/2018     07/28/2018       Renal:  Lab Results   Component Value Date     07/28/2018    K 4.2 07/28/2018    K 4.3 07/27/2018     07/28/2018    CO2 20 07/28/2018    BUN 10 07/28/2018    CREATININE 1.0 07/28/2018    CALCIUM 9.5 07/28/2018         Significant Diagnostic Studies    Radiology:   XR CHEST STANDARD (2 VW)   Final Result   1. No acute cardiopulmonary process. 2. Chronic findings as described in the body of the report. **This report has been created using voice recognition software. It may contain minor errors which are inherent in voice recognition technology. **      Final report electronically signed by Dr. Le Gonzales on 7/26/2018 12:59 PM      VL DUP LOWER EXTREMITY VENOUS LEFT   Final Result      No evidence of deep venous thrombosis in the left lower extremity. Final report electronically signed by Dr. Ozzie Clark on 7/26/2018 9:54 AM             Consults:     IP CONSULT TO INFECTIOUS DISEASES  IP CONSULT TO PHARMACY  IP CONSULT TO IV TEAM    Disposition:    [] Home       [] TCU       [] Rehab       [] Psych       [] SNF       [] [facility]       [] Other-    Condition at Discharge: Stable    Code Status:  Full Code     Patient Instructions:    Discharge lab work: Activity: activity as tolerated  Diet: DIET GENERAL; Low Sodium (2 GM)      Follow-up visits:   \[facility]\""[facility]  [address]  [address] [facility].  [address]  [address] [facility]  [phone]  Schedule an appointment as soon as possible for a visit in 1 week  For wound re-check, please call Monday to schedule date and time         Discharge Medications:      Tia Crowe   Home Medication Instructions GLT:[id number]    Printed on:07/28/18 1556   Medication Information                      amLODIPine (NORVASC) 2.5 MG tablet  Take 2.5 mg by mouth daily             cephALEXin (KEFLEX) 500 MG capsule  Take 1 capsule by mouth 4 times daily for 10 days             Cholecalciferol (VITAMIN D) 2000 units

## 2018-07-28 NOTE — PROGRESS NOTES
Hospitalist Progress Note    Patient:  Maynor Prince      Unit/Bed:8B-36/036-A    YOB: 1965    MRN: [id number]       Acct: [de-identified]     PCP: No primary care provider on file. Date of Admission: 7/26/2018    Chief Complaint: Pain, cellulities    Hospital Course: Pt is a 51 y/o admitted with LLE cellulities. ID is following and pt is on Ancef. ID planning d/c with keflex. Medical records requested from [place]. Continue mgt    7/28/18: Pt notes some improvement. ID cleared for d/c     Subjective: Pt doing ok, improving, ok with d/c      Medications:  Reviewed    Infusion Medications    sodium chloride 75 mL/hr at 07/27/18 1554     Scheduled Medications    warfarin  6 mg Oral Once    sodium chloride flush  10 mL Intravenous 2 times per day    docusate sodium  100 mg Oral BID    amLODIPine  2.5 mg Oral Daily    losartan  25 mg Oral Daily    warfarin (COUMADIN) daily dosing (placeholder)   Other RX Placeholder    ceFAZolin  2 g Intravenous Q8H     PRN Meds: acetaminophen, ibuprofen, sodium chloride flush, ondansetron      Intake/Output Summary (Last 24 hours) at 07/28/18 1554  Last data filed at 07/28/18 0273   Gross per 24 hour   Intake          1260.92 ml   Output             3400 ml   Net         -2139.08 ml       Diet:  DIET GENERAL; Low Sodium (2 GM)    Exam:  /83   Pulse 66   Temp 97.9 °F (36.6 °C) (Oral)   Resp 18   Ht 6' 2\" (1.88 m)   Wt 257 lb (116.6 kg)   SpO2 93%   BMI 33.00 kg/m²     General appearance: No apparent distress, appears stated age and cooperative. HEENT: Pupils equal, round, and reactive to light. Conjunctivae/corneas clear. Neck: Supple, with full range of motion. No jugular venous distention. Trachea midline. Respiratory:  Normal respiratory effort. Clear to auscultation, bilaterally without Rales/Wheezes/Rhonchi. Cardiovascular: Regular rate and rhythm with normal S1/S2 without murmurs, rubs or gallops.   Abdomen: Soft, non-tender, non-distended

## 2018-07-28 NOTE — PROGRESS NOTES
Discharge teaching and instructions for diagnosis/procedure of cellulitis of left leg completed with patient using teachback method. AVS reviewed. Printed prescriptions given to patient. Patient voiced understanding regarding prescriptions, follow up appointments, and care of self at home. Discharged in a wheelchair to  independent living per family. Pt to call coumadin clinic on Monday to get set up for coumadin work up. This nurse unable to make appointment due to after office hours. Pt also provided with dressing material for dressing change. No needs or concerns voiced at this time.

## 2018-07-28 NOTE — PROGRESS NOTES
Progress note: Infectious diseases    Patient - Bharathi Haro,  Age - 52 y.o.    - 1965      Room Number - 8B-36/036-A   MRN -  [id number]   Acct # - [de-identified]  Date of Admission -  2018  7:20 AM    SUBJECTIVE:   No new issues   The swelling on the left leg is better  OBJECTIVE   VITALS    height is 6' 2\" (1.88 m) and weight is 257 lb (116.6 kg). His oral temperature is 97.9 °F (36.6 °C). His blood pressure is 136/83 and his pulse is 66. His respiration is 18 and oxygen saturation is 93%.        Wt Readings from Last 3 Encounters:   18 257 lb (116.6 kg)       I/O (24 Hours)    Intake/Output Summary (Last 24 hours) at 18 1234  Last data filed at 18 9513   Gross per 24 hour   Intake          2403.92 ml   Output             4450 ml   Net         -2046.08 ml       General Appearance  Awake, alert, oriented,   HEENT - normocephalic, atraumatic, pink conjunctiva,  anicteric sclera  Neck - Supple, no mass  Lungs -  Bilateral air  air entry, no rhonchi, no wheeze  Cardiovascular - Heart sounds are normal.   Abdomen - soft, not distended, nontender,   Neurologic -oriented  Skin - No bruising or bleeding  Extremities - the edema on the left leg is better, less red and swelling  Dressing changed  MEDICATIONS:      warfarin  6 mg Oral Once    sodium chloride flush  10 mL Intravenous 2 times per day    docusate sodium  100 mg Oral BID    amLODIPine  2.5 mg Oral Daily    losartan  25 mg Oral Daily    warfarin (COUMADIN) daily dosing (placeholder)   Other RX Placeholder    ceFAZolin  2 g Intravenous Q8H      sodium chloride 75 mL/hr at 18 1554     acetaminophen, ibuprofen, sodium chloride flush, ondansetron      LABS:     CBC:   Recent Labs      18   0740  18   0722  18   0922   WBC  6.1  6.1  4.8   HGB  15.3  13.9*  14.8   PLT  210  201  214     BMP:    Recent Labs 07/26/18   0740  07/27/18   0722  07/28/18   0922   NA  141  139  140   K  4.3  4.3  4.2   CL  100  102  104   CO2  28  26  20*   BUN  13  12  10   CREATININE  0.9  1.0  1.0   GLUCOSE  147*  99  151*     Calcium:  Recent Labs      07/28/18   0922   CALCIUM  9.5     Ionized Calcium:No results for input(s): IONCA in the last 72 hours. Magnesium:No results for input(s): MG in the last 72 hours. Phosphorus:No results for input(s): PHOS in the last 72 hours. BNP:No results for input(s): BNP in the last 72 hours. Glucose:No results for input(s): POCGLU in the last 72 hours. HgbA1C: No results for input(s): LABA1C in the last 72 hours. INR:   Recent Labs      07/26/18   0740  07/27/18   0722  07/28/18   0630   INR  3.48*  2.94*  2.41*     Hepatic:   Recent Labs      07/26/18   1147   ALKPHOS  38   ALT  24   AST  21   PROT  6.9   BILITOT  0.4   BILIDIR  <0.2   LABALBU  3.9     Amylase and Lipase:No results for input(s): LACTA, AMYLASE in the last 72 hours. Lactic Acid: No results for input(s): LACTA in the last 72 hours. Troponin: No results for input(s): CKTOTAL, CKMB, TROPONINI in the last 72 hours. BNP: No results for input(s): BNP in the last 72 hours.     CULTURES:   UA:   Recent Labs      07/26/18   1550   SPECGRAV  1.025   PHUR  7.0   COLORU  YELLOW   PROTEINU  NEGATIVE   BLOODU  NEGATIVE   RBCUA  3-5   WBCUA  NONE SEEN   BACTERIA  NONE   NITRU  NEGATIVE   BILIRUBINUR  NEGATIVE   UROBILINOGEN  0.2   KETUA  NEGATIVE   LABCAST  NONE SEEN  NONE SEEN     Micro:   Lab Results   Component Value Date    BC No growth-preliminary 07/26/2018         IMAGING:         Problem list of patient:     Patient Active Problem List   Diagnosis Code    Cellulitis L03.90    Rash R21    HTN (hypertension) I10    Chronic anticoagulation Z79.01    Presence of IVC filter O09.602         ASSESSMENT/PLAN   Left leg cellulites  Chronic venostasis dermatites  Continue compression wrap  Follow up at the wound clinic in 1-2 wks    Elenita Peoples MD, [address] 7/28/2018 12:34 PM

## 2018-07-31 LAB
BLOOD CULTURE, ROUTINE: NORMAL
BLOOD CULTURE, ROUTINE: NORMAL

## 2018-08-08 ENCOUNTER — HOSPITAL ENCOUNTER (OUTPATIENT)
Dept: WOUND CARE | Age: 53
Discharge: HOME | End: 2018-08-08
Payer: COMMERCIAL

## 2018-08-08 VITALS
HEART RATE: 70 BPM | DIASTOLIC BLOOD PRESSURE: 93 MMHG | BODY MASS INDEX: 33.84 KG/M2 | SYSTOLIC BLOOD PRESSURE: 138 MMHG | RESPIRATION RATE: 18 BRPM | TEMPERATURE: 98.6 F | OXYGEN SATURATION: 96 % | WEIGHT: 263.6 LBS

## 2018-08-08 PROCEDURE — 29580 STRAPPING UNNA BOOT: CPT

## 2018-08-08 PROCEDURE — G0463 HOSPITAL OUTPT CLINIC VISIT: HCPCS

## 2018-08-08 PROCEDURE — 2709999900 HC NON-CHARGEABLE SUPPLY

## 2018-08-08 ASSESSMENT — PAIN DESCRIPTION - PROGRESSION: CLINICAL_PROGRESSION: NOT CHANGED

## 2018-08-08 ASSESSMENT — PAIN DESCRIPTION - FREQUENCY: FREQUENCY: INTERMITTENT

## 2018-08-08 ASSESSMENT — PAIN DESCRIPTION - DESCRIPTORS: DESCRIPTORS: ACHING

## 2018-08-08 ASSESSMENT — PAIN DESCRIPTION - ONSET: ONSET: ON-GOING

## 2018-08-08 ASSESSMENT — PAIN DESCRIPTION - ORIENTATION: ORIENTATION: LEFT

## 2018-08-08 ASSESSMENT — PAIN DESCRIPTION - PAIN TYPE: TYPE: ACUTE PAIN

## 2018-08-08 ASSESSMENT — PAIN SCALES - GENERAL: PAINLEVEL_OUTOF10: 4

## 2018-08-08 ASSESSMENT — PAIN DESCRIPTION - LOCATION: LOCATION: LEG

## 2018-08-22 ENCOUNTER — HOSPITAL ENCOUNTER (OUTPATIENT)
Dept: WOUND CARE | Age: 53
Discharge: HOME | End: 2018-08-22
Payer: COMMERCIAL

## 2018-08-22 VITALS
WEIGHT: 263 LBS | DIASTOLIC BLOOD PRESSURE: 83 MMHG | TEMPERATURE: 98.6 F | SYSTOLIC BLOOD PRESSURE: 143 MMHG | BODY MASS INDEX: 33.75 KG/M2 | HEART RATE: 57 BPM | RESPIRATION RATE: 18 BRPM | HEIGHT: 74 IN | OXYGEN SATURATION: 99 %

## 2018-08-22 DIAGNOSIS — I89.0 LYMPHEDEMA OF BOTH LOWER EXTREMITIES: Primary | ICD-10-CM

## 2018-08-22 DIAGNOSIS — I89.0 LYMPHEDEMA: ICD-10-CM

## 2018-08-22 PROCEDURE — 99211 OFF/OP EST MAY X REQ PHY/QHP: CPT

## 2018-08-22 ASSESSMENT — PAIN SCALES - GENERAL: PAINLEVEL_OUTOF10: 0

## 2018-08-22 NOTE — PROGRESS NOTES
[facility]          Progress Note and Procedure Note      Bharathi Haro  MEDICAL RECORD NUMBER:  [id number]  AGE: 52 y.o. GENDER: male  : 1965  EPISODE DATE:  2018    Subjective:     SUBJECTIVE     Chief Complaint   Patient presents with    Wound Check     left leg            HISTORY OF PRESENT ILLNESS   He was seen for follow up visit. He has chronic vein Disease. Recently treated for left leg cellulitis. He has been on compression therapy. He has been elevating the legs doing exercises. He continues to have swelling on his left leg with chronic stasis changes and itching. He is using his compression therapy. He has no fever or chills. PAST MEDICAL HISTORY         Diagnosis Date    Cerebral artery occlusion with cerebral infarction ([address]     Deep vein blood clot of left lower extremity ([address]     Hyperlipidemia     Hypertension          PAST SURGICAL HISTORY     Past Surgical History:   Procedure Laterality Date    CHOLECYSTECTOMY      COLONOSCOPY      ENDOSCOPIC ULTRASOUND (LOWER)      ENDOSCOPY, COLON, DIAGNOSTIC      IVC FILTER INSERTION      NASAL SEPTUM SURGERY       FAMILY HISTORY         History reviewed. No pertinent family history.   SOCIAL HISTORY       Social History   Substance Use Topics    Smoking status: Former Smoker     Types: Cigarettes     Quit date: 2007    Smokeless tobacco: Never Used    Alcohol use Yes      Comment: rare     ALLERGIES         Allergies   Allergen Reactions    Morphine      Pt states he gets severe headaches    Neurontin [Gabapentin] Hives     MEDICATIONS         Current Outpatient Prescriptions on File Prior to Encounter   Medication Sig Dispense Refill    ibuprofen (ADVIL;MOTRIN) 400 MG tablet Take 1 tablet by mouth every 6 hours as needed for Pain 120 tablet 0    warfarin (COUMADIN) 4 MG tablet Take 4 mg by mouth daily      losartan (COZAAR) 25 MG tablet Take 25 mg by mouth daily      amLODIPine (NORVASC) 2.5 MG 8/22/2018 10:05 AM   Wound Width (cm) 0 cm 8/22/2018 10:05 AM   Wound Depth (cm)  0 8/22/2018 10:05 AM   Calculated Wound Size (cm^2) (l*w) 0 cm^2 8/22/2018 10:05 AM   Change in Wound Size % (l*w) 100 8/22/2018 10:05 AM   Wound Assessment Yellow;Red;Epithelialization 8/8/2018 11:46 AM   Drainage Amount Moderate 8/8/2018 11:46 AM   Drainage Description Yellow 8/8/2018 11:46 AM   Odor None 8/8/2018 11:46 AM   Margins Attached edges 8/8/2018 11:46 AM   Edna-wound Assessment Red;Purple 8/8/2018 11:46 AM   Red%Wound Bed 20 8/8/2018 11:46 AM   Yellow%Wound Bed 30 8/8/2018 11:46 AM   Other%Wound Bed 50 8/8/2018 11:46 AM   Number of days: 13       LABS      Lab Results   Component Value Date    BC No growth-preliminary  No growth   07/26/2018       Assessment:   Left leg cellulites: Resolved  Chronic venostasis dermatites: The leg is still swollen despite compression therapy leg elevation. Patient may benefit from compression/lymphedema pump  Patient will  referred for a pump   Follow up will be scheduled if he has issues       Patient Active Problem List          Patient Active Problem List   Diagnosis Code    Cellulitis L03.90    Rash R21    HTN (hypertension) I10    Chronic anticoagulation Z79.01    Presence of IVC filter S10.003         Plan:     Patient examined and evaluated    Treatment: No orders of the defined types were placed in this encounter. Please see attached Discharge Instructions    Written patient dismissal instructions given to patient and signed by patient or POA. Discharge Instructions       Visit Discharge/Physician Orders:    - Lymphedema pump ordering process started . - When lymphedema pumps received use twice daily for 1 hr each time  - Will refer to lymphedema clinic. Will call with appointment time and date. - Apply moisturizer cream to bilateral lower legs at night time.     Wound Location: left lower medial leg     Dressing orders:  Wear compression hose.  Apply in AM take

## 2018-08-22 NOTE — PROGRESS NOTES
Etta JOSE has reviewed and agrees with BRANDON Zavala LPN's shift   Assessment.     [facility]  8/22/2018

## 2018-09-19 ENCOUNTER — HOSPITAL ENCOUNTER (OUTPATIENT)
Dept: PHYSICAL THERAPY | Age: 53
Setting detail: THERAPIES SERIES
Discharge: HOME | End: 2018-09-19
Payer: COMMERCIAL

## 2018-09-19 PROCEDURE — 97161 PT EVAL LOW COMPLEX 20 MIN: CPT

## 2018-09-19 ASSESSMENT — PAIN DESCRIPTION - PAIN TYPE: TYPE: CHRONIC PAIN

## 2018-09-19 ASSESSMENT — PAIN SCALES - GENERAL: PAINLEVEL_OUTOF10: 1

## 2018-09-19 ASSESSMENT — PAIN DESCRIPTION - DESCRIPTORS: DESCRIPTORS: TIGHTNESS

## 2018-09-19 ASSESSMENT — PAIN DESCRIPTION - ORIENTATION: ORIENTATION: LEFT

## 2018-09-19 ASSESSMENT — PAIN DESCRIPTION - LOCATION: LOCATION: LEG

## 2018-09-19 NOTE — PROGRESS NOTES
** PLEASE SIGN, DATE AND TIME CERTIFICATION BELOW AND RETURN TO [facility] OUTPATIENT REHABILITATION (FAX #: [phone]). ATTEST/CO-SIGN IF ACCESSING VIA INOLX. THANK YOU.**    I certify that I have examined the patient below and determined that Physical Medicine and Rehabilitation service is necessary and that I approve the established plan of care for up to 90 days or as specifically noted. Attestation, signature or co-signature of physician indicates approval of certification requirements.    ________________________ ____________ __________  Physician Signature   Date   Time    Diana C[facility] Ulises  LYMPHEDEMA SERVICES EVALUATION    Date: 2018  Patient Name: Gabriella Garg        CSN: [id number]   : 1965  (52 y.o.)  Gender: male   Referring Practitioner: DR. Carloz Urbina MD  Diagnosis: LYMPHEDEMA OF BILATERAL LOWER EXTREMITIES.    Referral Date : 18  PT Visit Information  PT Insurance Information: [employer]/BS  Total # of Visits Approved: 1  Total # of Visits to Date: 1  Plan of Care/Certification Expiration Date: 18  No Show: 0  Canceled Appointment: 0  Progress Note Counter:  (INITIAL EVALUATION)    Past Medical History:   Diagnosis Date    [facility] occlusion with [facility]     Deep vein blood clot of left lower extremity ([facility]     Hyperlipidemia     Hypertension        Past Surgical History:   Procedure Laterality Date    CHOLECYSTECTOMY      COLONOSCOPY      ENDOSCOPIC ULTRASOUND (LOWER)      ENDOSCOPY, COLON, DIAGNOSTIC      IVC FILTER INSERTION      NASAL SEPTUM SURGERY         Current Outpatient Prescriptions   Medication Sig Dispense Refill    ibuprofen (ADVIL;MOTRIN) 400 MG tablet Take 1 tablet by mouth every 6 hours as needed for Pain 120 tablet 0    warfarin (COUMADIN) 4 MG tablet Take 4 mg by mouth daily      losartan (COZAAR) 25 MG tablet Take 25 mg by mouth daily      amLODIPine (NORVASC) 2.5 MG tablet Take 2.5 mg by mouth NA (Patient independent with mobility). -EMPLOYMENT: Full time (Prolonged standing, walking)    Pain:  Patient Currently in Pain: Yes  Pain Assessment  Pain Assessment: 0-10  Pain Level: 1  Pain Type: Chronic pain  Pain Location: Leg  Pain Orientation: Left  Pain Descriptors: Tightness    SUBJECTIVE:     -The patient is a very pleasant 52year old male who presented with moderate to severe left lower extremity and moderate right lower extremity Lymphedema swelling. He presented wearing his 15-20 mm Hg compression socks and reported that his legs get progressively worse as the day progresses. OBJECTIVE  Physical Assessment:  Range of Motion: Bilateral lower extremities within functional limits. Strength: Bilateral lower extremities grossly 4+/5. Sensation: Bilateral lower extremities are grossly intact to light tough and able to differentiate between right and left lower extremities with the exception of diminished sensation in the medial aspect of the left lower leg. Transfers: Within functional limits. Ambulation: Within functional limits without assistive device. Lymphedema Assessment:  Pitting Edema Moderate (+2) - Left lower leg/ankle/dorsum of foot; Slight (+1) pitting in the right lower leg/ankle/dorsum of foot. Skin Appearance/Condition Moderate Hyperplasia noted in the left lower extremity and mild Hyperplasia in the right lower extremity. Skin Condition Dry - Mild dry skin noted in bilateral lower extremities. Open Wound(s) No   Tissue Temperature Normal   Skin Folds Medium bulging noted at left ankle region (bilaterally). Fibrotic Tissue Minimal - Bilateral lower extremities. Orange Peel Texture None   Nailbed Appearance/Condition Slightly long toe nails (bilaterally). Leakage of Fluid Amount: None     Circumferential Measurements:  Measurements to be taken during initial treatment session.     TREATMENT  No treatment was initiated during the evaluation, treatment will Exercising: To stimulate the flow of excess lymphatic fluid while the muscles contract against the compression bandages/garments during functional mobility/exercises that will lead to a decrease in swelling. X Patient Education: To train and educate the patient and/or caregiver on becoming independent with the home management skills for this chronic condition of Lymphedema. X Garment Fitting/Assessment:  Assist with recommending and obtaining appropriate compression garments to be worn daily to keep swelling down in the involved areas. EDUCATION   New Education Provided: Lymphedema awareness; treatment options; goals. Learner:patient  Method: demonstration and explanation. Handouts. Outcome: acknowledged understanding of goals/plan of care, demonstrated understanding and asked questions     GOALS:   PATIENT GOAL: \"I just want to get my legs down\" per patient. SHORT-TERM GOALS:  to be met in 6 weeks   X  Patient will demonstrate a decrease in circumferential measurements of the affected extremity by 10 cm working towards the lymphedema swelling stabilizing and patient being able to get measured and fitted for a new compression garment to be worn daily to keep swelling down. X  Patient will tolerate wearing the compression bandages from one treatment session until the next treatment session working towards meeting short-term goal #1. X Patient/family/caregiver will demonstrate and verbalize 3-5 skin care precautionary measures to decrease dry skin and risk of infection. X Patient/family/caregiver will demonstrate applying compression bandages with no greater than moderate assist and verbal cues from the therapist working towards being modified independent with applying the compression bandages for night time swelling.            LONG-TERM GOALS:  to be met in 12 weeks   X Lymphedema swelling will stabilize as noted by total circumferential changes being no greater than 3.0-5.0 cm in

## 2018-10-25 NOTE — DISCHARGE SUMMARY
[facility] (Lymphedema Services)     Patient Name: Denise Jacobsen        CSN: [id number]   YOB: 1965  Gender: male  Referring Practitioner: DR. Hero Hernández MD  Diagnosis: LYMPHEDEMA OF BILATERAL LOWER EXTREMITIES. Patient is discharged from Physical Therapy services at this time. See last note for details related to results of therapy and goal achievement. Reason for discharge: The patient is a pleasant 52year old male who underwent an evaluation at the Lymphedema Clinic due to having moderate to severe left lower extremity and moderate right lower extremity Lymphedema swelling. The patient has not undergone any Complete Decongestive Therapy/Manual Lymph Drainage (CDT/MLD) treatment sessions. He is being discharged from the PT Lymphedema Services at this time due to the following reason(s):    Patient did not set-up follow up appointments to proceed with treatments (Initial evaluation was completed on 9/19/2018). The patient's chart is being discharged at this time and if the patient needs to return to the Lymphedema clinic for future treatments, he/she will require a new physician signed script/referral.        [address] Rena Navarro, [address], [place]       10/25/2018         *NOTE: PLEASE KEEP THIS DISCHARGE NOTE FOR YOUR RECORDS.

## 2019-03-03 ENCOUNTER — HOSPITAL ENCOUNTER (OUTPATIENT)
Age: 54
Setting detail: OBSERVATION
Discharge: HOME | End: 2019-03-04
Attending: EMERGENCY MEDICINE | Admitting: UROLOGY
Payer: COMMERCIAL

## 2019-03-03 ENCOUNTER — APPOINTMENT (OUTPATIENT)
Dept: CT IMAGING | Age: 54
End: 2019-03-03
Payer: COMMERCIAL

## 2019-03-03 DIAGNOSIS — N17.9 AKI (ACUTE KIDNEY INJURY) (HCC): ICD-10-CM

## 2019-03-03 DIAGNOSIS — N20.0 KIDNEY STONE: Primary | ICD-10-CM

## 2019-03-03 LAB
BASOPHILS # BLD: 0.6 %
BASOPHILS ABSOLUTE: 0 THOU/MM3 (ref 0–0.1)
EKG ATRIAL RATE: 73 BPM
EKG P AXIS: 46 DEGREES
EKG P-R INTERVAL: 170 MS
EKG Q-T INTERVAL: 406 MS
EKG QRS DURATION: 92 MS
EKG QTC CALCULATION (BAZETT): 447 MS
EKG R AXIS: 35 DEGREES
EKG T AXIS: 62 DEGREES
EKG VENTRICULAR RATE: 73 BPM
EOSINOPHIL # BLD: 1.5 %
EOSINOPHILS ABSOLUTE: 0.1 THOU/MM3 (ref 0–0.4)
ERYTHROCYTE [DISTWIDTH] IN BLOOD BY AUTOMATED COUNT: 13.2 % (ref 11.5–14.5)
ERYTHROCYTE [DISTWIDTH] IN BLOOD BY AUTOMATED COUNT: 41.1 FL (ref 35–45)
HCT VFR BLD CALC: 42.5 % (ref 42–52)
HEMOGLOBIN: 14.8 GM/DL (ref 14–18)
IMMATURE GRANS (ABS): 0.02 THOU/MM3 (ref 0–0.07)
IMMATURE GRANULOCYTES: 0.3 %
LYMPHOCYTES # BLD: 34 %
LYMPHOCYTES ABSOLUTE: 2.1 THOU/MM3 (ref 1–4.8)
MCH RBC QN AUTO: 30.3 PG (ref 26–33)
MCHC RBC AUTO-ENTMCNC: 34.8 GM/DL (ref 32.2–35.5)
MCV RBC AUTO: 86.9 FL (ref 80–94)
MONOCYTES # BLD: 9.7 %
MONOCYTES ABSOLUTE: 0.6 THOU/MM3 (ref 0.4–1.3)
NUCLEATED RED BLOOD CELLS: 0 /100 WBC
PLATELET # BLD: 200 THOU/MM3 (ref 130–400)
PMV BLD AUTO: 10.6 FL (ref 9.4–12.4)
RBC # BLD: 4.89 MILL/MM3 (ref 4.7–6.1)
SEG NEUTROPHILS: 53.9 %
SEGMENTED NEUTROPHILS ABSOLUTE COUNT: 3.3 THOU/MM3 (ref 1.8–7.7)
WBC # BLD: 6.2 THOU/MM3 (ref 4.8–10.8)

## 2019-03-03 PROCEDURE — 85025 COMPLETE CBC W/AUTO DIFF WBC: CPT

## 2019-03-03 PROCEDURE — 96374 THER/PROPH/DIAG INJ IV PUSH: CPT

## 2019-03-03 PROCEDURE — 96375 TX/PRO/DX INJ NEW DRUG ADDON: CPT

## 2019-03-03 PROCEDURE — 74176 CT ABD & PELVIS W/O CONTRAST: CPT

## 2019-03-03 PROCEDURE — 36415 COLL VENOUS BLD VENIPUNCTURE: CPT

## 2019-03-03 PROCEDURE — 6360000002 HC RX W HCPCS: Performed by: EMERGENCY MEDICINE

## 2019-03-03 PROCEDURE — 93005 ELECTROCARDIOGRAM TRACING: CPT | Performed by: EMERGENCY MEDICINE

## 2019-03-03 PROCEDURE — 99285 EMERGENCY DEPT VISIT HI MDM: CPT

## 2019-03-03 RX ORDER — ONDANSETRON 2 MG/ML
4 INJECTION INTRAMUSCULAR; INTRAVENOUS ONCE
Status: COMPLETED | OUTPATIENT
Start: 2019-03-04 | End: 2019-03-03

## 2019-03-03 RX ADMIN — HYDROMORPHONE HYDROCHLORIDE 1 MG: 1 INJECTION, SOLUTION INTRAMUSCULAR; INTRAVENOUS; SUBCUTANEOUS at 23:42

## 2019-03-03 RX ADMIN — ONDANSETRON 4 MG: 2 INJECTION INTRAMUSCULAR; INTRAVENOUS at 23:41

## 2019-03-03 ASSESSMENT — PAIN DESCRIPTION - LOCATION: LOCATION: ABDOMEN;FLANK

## 2019-03-03 ASSESSMENT — PAIN DESCRIPTION - DESCRIPTORS: DESCRIPTORS: SHARP

## 2019-03-03 ASSESSMENT — PAIN SCALES - GENERAL: PAINLEVEL_OUTOF10: 10

## 2019-03-03 ASSESSMENT — PAIN DESCRIPTION - ORIENTATION: ORIENTATION: LEFT

## 2019-03-03 ASSESSMENT — PAIN DESCRIPTION - PAIN TYPE: TYPE: ACUTE PAIN

## 2019-03-04 ENCOUNTER — TELEPHONE (OUTPATIENT)
Dept: UROLOGY | Age: 54
End: 2019-03-04

## 2019-03-04 VITALS
HEART RATE: 52 BPM | BODY MASS INDEX: 34.01 KG/M2 | HEIGHT: 74 IN | OXYGEN SATURATION: 92 % | RESPIRATION RATE: 18 BRPM | WEIGHT: 265 LBS | SYSTOLIC BLOOD PRESSURE: 137 MMHG | DIASTOLIC BLOOD PRESSURE: 90 MMHG | TEMPERATURE: 99 F

## 2019-03-04 PROBLEM — N20.0 KIDNEY STONE: Status: ACTIVE | Noted: 2019-03-04

## 2019-03-04 LAB
ALBUMIN SERPL-MCNC: 4.5 G/DL (ref 3.5–5.1)
ALP BLD-CCNC: 35 U/L (ref 38–126)
ALT SERPL-CCNC: 26 U/L (ref 11–66)
ANION GAP SERPL CALCULATED.3IONS-SCNC: 13 MEQ/L (ref 8–16)
ANION GAP SERPL CALCULATED.3IONS-SCNC: 14 MEQ/L (ref 8–16)
AST SERPL-CCNC: 20 U/L (ref 5–40)
BACTERIA: ABNORMAL /HPF
BILIRUB SERPL-MCNC: 0.5 MG/DL (ref 0.3–1.2)
BILIRUBIN DIRECT: < 0.2 MG/DL (ref 0–0.3)
BILIRUBIN URINE: NEGATIVE
BLOOD, URINE: ABNORMAL
BUN BLDV-MCNC: 24 MG/DL (ref 7–22)
BUN BLDV-MCNC: 26 MG/DL (ref 7–22)
CALCIUM SERPL-MCNC: 9.2 MG/DL (ref 8.5–10.5)
CALCIUM SERPL-MCNC: 9.8 MG/DL (ref 8.5–10.5)
CASTS 2: ABNORMAL /LPF
CASTS UA: ABNORMAL /LPF
CHARACTER, URINE: ABNORMAL
CHLORIDE BLD-SCNC: 104 MEQ/L (ref 98–111)
CHLORIDE BLD-SCNC: 107 MEQ/L (ref 98–111)
CO2: 22 MEQ/L (ref 23–33)
CO2: 24 MEQ/L (ref 23–33)
COLOR: YELLOW
CREAT SERPL-MCNC: 1.7 MG/DL (ref 0.4–1.2)
CREAT SERPL-MCNC: 2.1 MG/DL (ref 0.4–1.2)
CRYSTALS, UA: ABNORMAL
EPITHELIAL CELLS, UA: ABNORMAL /HPF
GFR SERPL CREATININE-BSD FRML MDRD: 33 ML/MIN/1.73M2
GFR SERPL CREATININE-BSD FRML MDRD: 42 ML/MIN/1.73M2
GLUCOSE BLD-MCNC: 106 MG/DL (ref 70–108)
GLUCOSE BLD-MCNC: 114 MG/DL (ref 70–108)
GLUCOSE URINE: NEGATIVE MG/DL
INR BLD: 2.7 (ref 0.85–1.13)
KETONES, URINE: NEGATIVE
LEUKOCYTE ESTERASE, URINE: NEGATIVE
LIPASE: 57.9 U/L (ref 5.6–51.3)
MISCELLANEOUS 2: ABNORMAL
NITRITE, URINE: NEGATIVE
OSMOLALITY CALCULATION: 284.3 MOSMOL/KG (ref 275–300)
PH UA: 8.5 (ref 5–9)
POTASSIUM SERPL-SCNC: 3.6 MEQ/L (ref 3.5–5.2)
POTASSIUM SERPL-SCNC: 4.3 MEQ/L (ref 3.5–5.2)
PROTEIN UA: NEGATIVE
RBC URINE: ABNORMAL /HPF
REASON FOR REJECTION: NORMAL
REJECTED TEST: NORMAL
RENAL EPITHELIAL, UA: ABNORMAL
SODIUM BLD-SCNC: 140 MEQ/L (ref 135–145)
SODIUM BLD-SCNC: 144 MEQ/L (ref 135–145)
SPECIFIC GRAVITY, URINE: 1.01 (ref 1–1.03)
TOTAL PROTEIN: 7.1 G/DL (ref 6.1–8)
UROBILINOGEN, URINE: 0.2 EU/DL (ref 0–1)
WBC UA: ABNORMAL /HPF
YEAST: ABNORMAL

## 2019-03-04 PROCEDURE — 36415 COLL VENOUS BLD VENIPUNCTURE: CPT

## 2019-03-04 PROCEDURE — 96376 TX/PRO/DX INJ SAME DRUG ADON: CPT

## 2019-03-04 PROCEDURE — 93010 ELECTROCARDIOGRAM REPORT: CPT | Performed by: NUCLEAR MEDICINE

## 2019-03-04 PROCEDURE — G0378 HOSPITAL OBSERVATION PER HR: HCPCS

## 2019-03-04 PROCEDURE — 81001 URINALYSIS AUTO W/SCOPE: CPT

## 2019-03-04 PROCEDURE — 6370000000 HC RX 637 (ALT 250 FOR IP): Performed by: NURSE PRACTITIONER

## 2019-03-04 PROCEDURE — 2709999900 HC NON-CHARGEABLE SUPPLY

## 2019-03-04 PROCEDURE — 99999 PR OFFICE/OUTPT VISIT,PROCEDURE ONLY: CPT | Performed by: NURSE PRACTITIONER

## 2019-03-04 PROCEDURE — 83690 ASSAY OF LIPASE: CPT

## 2019-03-04 PROCEDURE — 80076 HEPATIC FUNCTION PANEL: CPT

## 2019-03-04 PROCEDURE — 85610 PROTHROMBIN TIME: CPT

## 2019-03-04 PROCEDURE — 2580000003 HC RX 258: Performed by: NURSE PRACTITIONER

## 2019-03-04 PROCEDURE — 6360000002 HC RX W HCPCS: Performed by: NURSE PRACTITIONER

## 2019-03-04 PROCEDURE — 6360000002 HC RX W HCPCS: Performed by: EMERGENCY MEDICINE

## 2019-03-04 PROCEDURE — 99235 HOSP IP/OBS SAME DATE MOD 70: CPT | Performed by: NURSE PRACTITIONER

## 2019-03-04 PROCEDURE — 6360000002 HC RX W HCPCS

## 2019-03-04 PROCEDURE — 96375 TX/PRO/DX INJ NEW DRUG ADDON: CPT

## 2019-03-04 PROCEDURE — 80048 BASIC METABOLIC PNL TOTAL CA: CPT

## 2019-03-04 RX ORDER — ONDANSETRON 2 MG/ML
4 INJECTION INTRAMUSCULAR; INTRAVENOUS ONCE
Status: COMPLETED | OUTPATIENT
Start: 2019-03-04 | End: 2019-03-04

## 2019-03-04 RX ORDER — HYDROCODONE BITARTRATE AND ACETAMINOPHEN 5; 325 MG/1; MG/1
2 TABLET ORAL EVERY 6 HOURS PRN
Status: DISCONTINUED | OUTPATIENT
Start: 2019-03-04 | End: 2019-03-04 | Stop reason: HOSPADM

## 2019-03-04 RX ORDER — KETOROLAC TROMETHAMINE 30 MG/ML
30 INJECTION, SOLUTION INTRAMUSCULAR; INTRAVENOUS ONCE
Status: COMPLETED | OUTPATIENT
Start: 2019-03-04 | End: 2019-03-04

## 2019-03-04 RX ORDER — ONDANSETRON 2 MG/ML
4 INJECTION INTRAMUSCULAR; INTRAVENOUS ONCE
Status: DISCONTINUED | OUTPATIENT
Start: 2019-03-04 | End: 2019-03-04

## 2019-03-04 RX ORDER — TAMSULOSIN HYDROCHLORIDE 0.4 MG/1
0.4 CAPSULE ORAL DAILY
Qty: 15 CAPSULE | Refills: 0 | Status: SHIPPED | OUTPATIENT
Start: 2019-03-05 | End: 2019-12-07

## 2019-03-04 RX ORDER — WARFARIN SODIUM 4 MG/1
4 TABLET ORAL DAILY
Status: DISCONTINUED | OUTPATIENT
Start: 2019-03-04 | End: 2019-03-04 | Stop reason: HOSPADM

## 2019-03-04 RX ORDER — HYDROCODONE BITARTRATE AND ACETAMINOPHEN 5; 325 MG/1; MG/1
1 TABLET ORAL EVERY 6 HOURS PRN
Status: DISCONTINUED | OUTPATIENT
Start: 2019-03-04 | End: 2019-03-04 | Stop reason: HOSPADM

## 2019-03-04 RX ORDER — PROMETHAZINE HYDROCHLORIDE 25 MG/ML
6.25 INJECTION, SOLUTION INTRAMUSCULAR; INTRAVENOUS EVERY 6 HOURS PRN
Status: DISCONTINUED | OUTPATIENT
Start: 2019-03-04 | End: 2019-03-04 | Stop reason: HOSPADM

## 2019-03-04 RX ORDER — ONDANSETRON 2 MG/ML
4 INJECTION INTRAMUSCULAR; INTRAVENOUS EVERY 6 HOURS PRN
Status: DISCONTINUED | OUTPATIENT
Start: 2019-03-04 | End: 2019-03-04 | Stop reason: HOSPADM

## 2019-03-04 RX ORDER — ONDANSETRON 2 MG/ML
INJECTION INTRAMUSCULAR; INTRAVENOUS
Status: COMPLETED
Start: 2019-03-04 | End: 2019-03-04

## 2019-03-04 RX ORDER — SODIUM CHLORIDE 9 MG/ML
INJECTION, SOLUTION INTRAVENOUS CONTINUOUS
Status: DISCONTINUED | OUTPATIENT
Start: 2019-03-04 | End: 2019-03-04 | Stop reason: HOSPADM

## 2019-03-04 RX ORDER — SCOLOPAMINE TRANSDERMAL SYSTEM 1 MG/1
1 PATCH, EXTENDED RELEASE TRANSDERMAL
Status: DISCONTINUED | OUTPATIENT
Start: 2019-03-04 | End: 2019-03-04 | Stop reason: HOSPADM

## 2019-03-04 RX ORDER — KETOROLAC TROMETHAMINE 30 MG/ML
INJECTION, SOLUTION INTRAMUSCULAR; INTRAVENOUS
Status: COMPLETED
Start: 2019-03-04 | End: 2019-03-04

## 2019-03-04 RX ORDER — TAMSULOSIN HYDROCHLORIDE 0.4 MG/1
0.4 CAPSULE ORAL DAILY
Status: DISCONTINUED | OUTPATIENT
Start: 2019-03-04 | End: 2019-03-04 | Stop reason: HOSPADM

## 2019-03-04 RX ORDER — AMLODIPINE BESYLATE 2.5 MG/1
2.5 TABLET ORAL DAILY
Status: DISCONTINUED | OUTPATIENT
Start: 2019-03-04 | End: 2019-03-04 | Stop reason: HOSPADM

## 2019-03-04 RX ORDER — KETOROLAC TROMETHAMINE 30 MG/ML
30 INJECTION, SOLUTION INTRAMUSCULAR; INTRAVENOUS EVERY 6 HOURS PRN
Status: DISCONTINUED | OUTPATIENT
Start: 2019-03-04 | End: 2019-03-04 | Stop reason: HOSPADM

## 2019-03-04 RX ORDER — LOSARTAN POTASSIUM 25 MG/1
25 TABLET ORAL DAILY
Status: DISCONTINUED | OUTPATIENT
Start: 2019-03-04 | End: 2019-03-04 | Stop reason: HOSPADM

## 2019-03-04 RX ORDER — HYDROCODONE BITARTRATE AND ACETAMINOPHEN 5; 325 MG/1; MG/1
1 TABLET ORAL EVERY 6 HOURS PRN
Qty: 10 TABLET | Refills: 0 | Status: SHIPPED | OUTPATIENT
Start: 2019-03-04 | End: 2019-03-07

## 2019-03-04 RX ADMIN — KETOROLAC TROMETHAMINE 30 MG: 30 INJECTION, SOLUTION INTRAMUSCULAR; INTRAVENOUS at 02:28

## 2019-03-04 RX ADMIN — ONDANSETRON 4 MG: 2 INJECTION INTRAMUSCULAR; INTRAVENOUS at 02:00

## 2019-03-04 RX ADMIN — HYDROMORPHONE HYDROCHLORIDE 1 MG: 1 INJECTION, SOLUTION INTRAMUSCULAR; INTRAVENOUS; SUBCUTANEOUS at 10:27

## 2019-03-04 RX ADMIN — HYDROMORPHONE HYDROCHLORIDE 1 MG: 1 INJECTION, SOLUTION INTRAMUSCULAR; INTRAVENOUS; SUBCUTANEOUS at 04:26

## 2019-03-04 RX ADMIN — KETOROLAC TROMETHAMINE 30 MG: 30 INJECTION, SOLUTION INTRAMUSCULAR at 02:28

## 2019-03-04 RX ADMIN — HYDROMORPHONE HYDROCHLORIDE 1 MG: 1 INJECTION, SOLUTION INTRAMUSCULAR; INTRAVENOUS; SUBCUTANEOUS at 00:07

## 2019-03-04 RX ADMIN — SODIUM CHLORIDE: 9 INJECTION, SOLUTION INTRAVENOUS at 04:27

## 2019-03-04 RX ADMIN — LOSARTAN POTASSIUM 25 MG: 25 TABLET, FILM COATED ORAL at 07:40

## 2019-03-04 RX ADMIN — AMLODIPINE BESYLATE 2.5 MG: 2.5 TABLET ORAL at 07:40

## 2019-03-04 RX ADMIN — TAMSULOSIN HYDROCHLORIDE 0.4 MG: 0.4 CAPSULE ORAL at 10:26

## 2019-03-04 RX ADMIN — HYDROMORPHONE HYDROCHLORIDE 1 MG: 1 INJECTION, SOLUTION INTRAMUSCULAR; INTRAVENOUS; SUBCUTANEOUS at 07:40

## 2019-03-04 RX ADMIN — VITAMIN D, TAB 1000IU (100/BT) 4000 UNITS: 25 TAB at 07:40

## 2019-03-04 ASSESSMENT — PAIN DESCRIPTION - PROGRESSION
CLINICAL_PROGRESSION: GRADUALLY WORSENING
CLINICAL_PROGRESSION: NOT CHANGED
CLINICAL_PROGRESSION: GRADUALLY WORSENING
CLINICAL_PROGRESSION: GRADUALLY WORSENING
CLINICAL_PROGRESSION: NOT CHANGED
CLINICAL_PROGRESSION: GRADUALLY WORSENING

## 2019-03-04 ASSESSMENT — PAIN SCALES - GENERAL
PAINLEVEL_OUTOF10: 10
PAINLEVEL_OUTOF10: 8
PAINLEVEL_OUTOF10: 8
PAINLEVEL_OUTOF10: 3
PAINLEVEL_OUTOF10: 10
PAINLEVEL_OUTOF10: 8

## 2019-03-04 ASSESSMENT — PAIN DESCRIPTION - LOCATION
LOCATION: ABDOMEN;FLANK
LOCATION: ABDOMEN;BACK
LOCATION: ABDOMEN;BACK
LOCATION: ABDOMEN;FLANK

## 2019-03-04 ASSESSMENT — PAIN DESCRIPTION - PAIN TYPE
TYPE: ACUTE PAIN

## 2019-03-04 ASSESSMENT — PAIN DESCRIPTION - ONSET
ONSET: ON-GOING

## 2019-03-04 ASSESSMENT — PAIN - FUNCTIONAL ASSESSMENT
PAIN_FUNCTIONAL_ASSESSMENT: ACTIVITIES ARE NOT PREVENTED
PAIN_FUNCTIONAL_ASSESSMENT: PREVENTS OR INTERFERES WITH MANY ACTIVE NOT PASSIVE ACTIVITIES

## 2019-03-04 ASSESSMENT — PAIN DESCRIPTION - DIRECTION: RADIATING_TOWARDS: ABDOMEN

## 2019-03-04 ASSESSMENT — PAIN DESCRIPTION - FREQUENCY
FREQUENCY: CONTINUOUS

## 2019-03-04 ASSESSMENT — PAIN DESCRIPTION - DESCRIPTORS
DESCRIPTORS: SHARP
DESCRIPTORS: SHARP;CONSTANT

## 2019-03-04 ASSESSMENT — PAIN DESCRIPTION - ORIENTATION
ORIENTATION: LEFT

## 2019-03-06 ENCOUNTER — NURSE ONLY (OUTPATIENT)
Dept: FAMILY MEDICINE CLINIC | Age: 54
End: 2019-03-06
Payer: COMMERCIAL

## 2019-03-06 ENCOUNTER — TELEPHONE (OUTPATIENT)
Dept: UROLOGY | Age: 54
End: 2019-03-06

## 2019-03-06 DIAGNOSIS — N20.0 KIDNEY STONE: ICD-10-CM

## 2019-03-06 DIAGNOSIS — N17.9 AKI (ACUTE KIDNEY INJURY) (HCC): ICD-10-CM

## 2019-03-06 LAB
ANION GAP SERPL CALCULATED.3IONS-SCNC: 19 MEQ/L (ref 8–16)
BUN BLDV-MCNC: 20 MG/DL (ref 7–22)
CALCIUM SERPL-MCNC: 9.3 MG/DL (ref 8.5–10.5)
CHLORIDE BLD-SCNC: 105 MEQ/L (ref 98–111)
CO2: 20 MEQ/L (ref 23–33)
CREAT SERPL-MCNC: 1.3 MG/DL (ref 0.4–1.2)
GFR SERPL CREATININE-BSD FRML MDRD: 57 ML/MIN/1.73M2
GLUCOSE BLD-MCNC: 108 MG/DL (ref 70–108)
POTASSIUM SERPL-SCNC: 4.5 MEQ/L (ref 3.5–5.2)
SODIUM BLD-SCNC: 144 MEQ/L (ref 135–145)

## 2019-03-06 PROCEDURE — 36415 COLL VENOUS BLD VENIPUNCTURE: CPT | Performed by: NURSE PRACTITIONER

## 2019-03-07 ENCOUNTER — PROCEDURE VISIT (OUTPATIENT)
Dept: UROLOGY | Age: 54
End: 2019-03-07
Payer: COMMERCIAL

## 2019-03-07 VITALS
BODY MASS INDEX: 33.37 KG/M2 | DIASTOLIC BLOOD PRESSURE: 78 MMHG | HEIGHT: 74 IN | SYSTOLIC BLOOD PRESSURE: 138 MMHG | WEIGHT: 260 LBS

## 2019-03-07 DIAGNOSIS — N20.0 KIDNEY STONE: Primary | ICD-10-CM

## 2019-03-07 DIAGNOSIS — Z12.5 SCREENING FOR PROSTATE CANCER: ICD-10-CM

## 2019-03-07 LAB
BILIRUBIN URINE: NEGATIVE
BLOOD URINE, POC: ABNORMAL
CHARACTER, URINE: CLEAR
COLOR, URINE: YELLOW
GLUCOSE URINE: NEGATIVE MG/DL
KETONES, URINE: NEGATIVE
LEUKOCYTE CLUMPS, URINE: NEGATIVE
NITRITE, URINE: NEGATIVE
PH, URINE: 7 (ref 5–9)
PROTEIN, URINE: NEGATIVE MG/DL
SPECIFIC GRAVITY, URINE: 1.02 (ref 1–1.03)
UROBILINOGEN, URINE: 0.2 EU/DL (ref 0–1)

## 2019-03-07 PROCEDURE — 81003 URINALYSIS AUTO W/O SCOPE: CPT | Performed by: UROLOGY

## 2019-03-07 PROCEDURE — 99212 OFFICE O/P EST SF 10 MIN: CPT | Performed by: UROLOGY

## 2019-03-07 ASSESSMENT — ENCOUNTER SYMPTOMS
EYE PAIN: 0
EYE DISCHARGE: 0
BLOOD IN STOOL: 0
BACK PAIN: 0
ABDOMINAL PAIN: 0
CHEST TIGHTNESS: 0
CHOKING: 0

## 2019-04-09 ENCOUNTER — TELEPHONE (OUTPATIENT)
Dept: UROLOGY | Age: 54
End: 2019-04-09

## 2019-04-09 NOTE — TELEPHONE ENCOUNTER
Patients litholink results are back. Patient had total urine volume of 1.82 liters, it is recommended patient increase intake to 2.5 liters to prevent future stones. Patient also with high calcium in urine. Pt should maintain a moderate calcium intake of 800-1200 mg of calcium per day from diet intake and not from supplements. Too little calcium and too much calcium in the diet can both contribute to stone formation. Urine sodium levels were elevated which contribute to stone formation. Reducing dietary sodium intake can also greatly decrease risk of calcium stone formation. Sodium can sneak into the diet in lunch meats, cheeses, snack foods, processed foods, frozen foods, canned foods, restaurant foods. Goal sodium intake should be 1268-8245 mg of sodium per day. Patient's urine oxalate was elevated. Stop any vitamin C supplements if he is taking any. Limit oxalate consumption. Oxalate is one of the most common contents of kidney stones and pt's stone was predominantly calcium oxalate. Foods rich in oxalates are all types of nuts, tea, spinach, rhubarb, cranberry, chocolate, and black pepper. Oxalate content of some common foods    Food   Oxalate Content  Serving size  Spinach  645 mg   100 g  Sweet potato  87 mg    100 g  Chocolate  82 mg    100 g  Multigrain Cereal 53 mg    30   g  Green beans  33 mg    100 g  Peanut Butter  19 mg    20   g  Tea (brewed)  19 mg    250 g  Celery   18 mg    30   g  Tomato  13 mg    100 g        Uric acid levels elevated as well, I would recommend limiting poultry, fish and meat protein intake to help prevent future stones from forming. Patient can try dietary changes first, can repeat litholink in 2 months, if he is interested. Ok to mail to patient along with litholink results.     Thank you

## 2019-12-07 ENCOUNTER — HOSPITAL ENCOUNTER (OUTPATIENT)
Age: 54
Setting detail: OBSERVATION
Discharge: HOME | End: 2019-12-08
Attending: FAMILY MEDICINE | Admitting: OPHTHALMOLOGY
Payer: COMMERCIAL

## 2019-12-07 ENCOUNTER — APPOINTMENT (OUTPATIENT)
Dept: GENERAL RADIOLOGY | Age: 54
End: 2019-12-07

## 2019-12-07 DIAGNOSIS — M54.32 SCIATICA OF LEFT SIDE: Primary | ICD-10-CM

## 2019-12-07 DIAGNOSIS — M54.9 INTRACTABLE BACK PAIN: ICD-10-CM

## 2019-12-07 DIAGNOSIS — M54.42 ACUTE BILATERAL LOW BACK PAIN WITH LEFT-SIDED SCIATICA: ICD-10-CM

## 2019-12-07 DIAGNOSIS — S39.012A STRAIN OF LUMBAR PARASPINAL MUSCLE, INITIAL ENCOUNTER: ICD-10-CM

## 2019-12-07 PROBLEM — M54.50 LOW BACK PAIN RADIATING TO RIGHT LOWER EXTREMITY: Status: ACTIVE | Noted: 2019-12-07

## 2019-12-07 PROBLEM — M79.604 LOW BACK PAIN RADIATING TO RIGHT LOWER EXTREMITY: Status: ACTIVE | Noted: 2019-12-07

## 2019-12-07 PROBLEM — M54.41 ACUTE MIDLINE LOW BACK PAIN WITH RIGHT-SIDED SCIATICA: Status: ACTIVE | Noted: 2019-12-07

## 2019-12-07 LAB
ANION GAP SERPL CALCULATED.3IONS-SCNC: 10 MEQ/L (ref 8–16)
BASOPHILS # BLD: 0.5 %
BASOPHILS ABSOLUTE: 0 THOU/MM3 (ref 0–0.1)
BUN BLDV-MCNC: 13 MG/DL (ref 7–22)
C-REACTIVE PROTEIN: 0.26 MG/DL (ref 0–1)
CALCIUM SERPL-MCNC: 9.8 MG/DL (ref 8.5–10.5)
CHLORIDE BLD-SCNC: 100 MEQ/L (ref 98–111)
CO2: 26 MEQ/L (ref 23–33)
CREAT SERPL-MCNC: 1.1 MG/DL (ref 0.4–1.2)
EOSINOPHIL # BLD: 1 %
EOSINOPHILS ABSOLUTE: 0.1 THOU/MM3 (ref 0–0.4)
ERYTHROCYTE [DISTWIDTH] IN BLOOD BY AUTOMATED COUNT: 13.1 % (ref 11.5–14.5)
ERYTHROCYTE [DISTWIDTH] IN BLOOD BY AUTOMATED COUNT: 44 FL (ref 35–45)
GFR SERPL CREATININE-BSD FRML MDRD: 69 ML/MIN/1.73M2
GLUCOSE BLD-MCNC: 97 MG/DL (ref 70–108)
HCT VFR BLD CALC: 45.5 % (ref 42–52)
HEMOGLOBIN: 15.2 GM/DL (ref 14–18)
IMMATURE GRANS (ABS): 0.01 THOU/MM3 (ref 0–0.07)
IMMATURE GRANULOCYTES: 0.2 %
INR BLD: 3.22 (ref 0.85–1.13)
LYMPHOCYTES # BLD: 28.1 %
LYMPHOCYTES ABSOLUTE: 1.7 THOU/MM3 (ref 1–4.8)
MCH RBC QN AUTO: 30.9 PG (ref 26–33)
MCHC RBC AUTO-ENTMCNC: 33.4 GM/DL (ref 32.2–35.5)
MCV RBC AUTO: 92.5 FL (ref 80–94)
MONOCYTES # BLD: 6.8 %
MONOCYTES ABSOLUTE: 0.4 THOU/MM3 (ref 0.4–1.3)
NUCLEATED RED BLOOD CELLS: 0 /100 WBC
OSMOLALITY CALCULATION: 272 MOSMOL/KG (ref 275–300)
PLATELET # BLD: 189 THOU/MM3 (ref 130–400)
PMV BLD AUTO: 10.3 FL (ref 9.4–12.4)
POTASSIUM SERPL-SCNC: 4.3 MEQ/L (ref 3.5–5.2)
RBC # BLD: 4.92 MILL/MM3 (ref 4.7–6.1)
SEDIMENTATION RATE, ERYTHROCYTE: 10 MM/HR (ref 0–10)
SEG NEUTROPHILS: 63.4 %
SEGMENTED NEUTROPHILS ABSOLUTE COUNT: 3.8 THOU/MM3 (ref 1.8–7.7)
SODIUM BLD-SCNC: 136 MEQ/L (ref 135–145)
WBC # BLD: 6 THOU/MM3 (ref 4.8–10.8)

## 2019-12-07 PROCEDURE — 72100 X-RAY EXAM L-S SPINE 2/3 VWS: CPT

## 2019-12-07 PROCEDURE — 99285 EMERGENCY DEPT VISIT HI MDM: CPT

## 2019-12-07 PROCEDURE — 96374 THER/PROPH/DIAG INJ IV PUSH: CPT

## 2019-12-07 PROCEDURE — G0378 HOSPITAL OBSERVATION PER HR: HCPCS

## 2019-12-07 PROCEDURE — 85025 COMPLETE CBC W/AUTO DIFF WBC: CPT

## 2019-12-07 PROCEDURE — 96375 TX/PRO/DX INJ NEW DRUG ADDON: CPT

## 2019-12-07 PROCEDURE — 6360000002 HC RX W HCPCS: Performed by: OPHTHALMOLOGY

## 2019-12-07 PROCEDURE — 96372 THER/PROPH/DIAG INJ SC/IM: CPT

## 2019-12-07 PROCEDURE — 99220 PR INITIAL OBSERVATION CARE/DAY 70 MINUTES: CPT | Performed by: OPHTHALMOLOGY

## 2019-12-07 PROCEDURE — 86140 C-REACTIVE PROTEIN: CPT

## 2019-12-07 PROCEDURE — 36415 COLL VENOUS BLD VENIPUNCTURE: CPT

## 2019-12-07 PROCEDURE — 96376 TX/PRO/DX INJ SAME DRUG ADON: CPT

## 2019-12-07 PROCEDURE — 6370000000 HC RX 637 (ALT 250 FOR IP): Performed by: FAMILY MEDICINE

## 2019-12-07 PROCEDURE — 85610 PROTHROMBIN TIME: CPT

## 2019-12-07 PROCEDURE — 85651 RBC SED RATE NONAUTOMATED: CPT

## 2019-12-07 PROCEDURE — 80048 BASIC METABOLIC PNL TOTAL CA: CPT

## 2019-12-07 PROCEDURE — 6360000002 HC RX W HCPCS: Performed by: FAMILY MEDICINE

## 2019-12-07 PROCEDURE — 6370000000 HC RX 637 (ALT 250 FOR IP): Performed by: OPHTHALMOLOGY

## 2019-12-07 RX ORDER — LIDOCAINE 4 G/G
1 PATCH TOPICAL ONCE
Status: COMPLETED | OUTPATIENT
Start: 2019-12-07 | End: 2019-12-08

## 2019-12-07 RX ORDER — WARFARIN SODIUM 4 MG/1
4 TABLET ORAL DAILY
Status: DISCONTINUED | OUTPATIENT
Start: 2019-12-07 | End: 2019-12-07

## 2019-12-07 RX ORDER — CYCLOBENZAPRINE HCL 10 MG
10 TABLET ORAL 3 TIMES DAILY PRN
Status: DISCONTINUED | OUTPATIENT
Start: 2019-12-07 | End: 2019-12-08 | Stop reason: HOSPADM

## 2019-12-07 RX ORDER — OXYCODONE HYDROCHLORIDE AND ACETAMINOPHEN 5; 325 MG/1; MG/1
1 TABLET ORAL EVERY 4 HOURS PRN
Status: DISCONTINUED | OUTPATIENT
Start: 2019-12-07 | End: 2019-12-08 | Stop reason: HOSPADM

## 2019-12-07 RX ORDER — TRAMADOL HYDROCHLORIDE 50 MG/1
50 TABLET ORAL EVERY 4 HOURS PRN
Qty: 18 TABLET | Refills: 0 | Status: SHIPPED | OUTPATIENT
Start: 2019-12-07 | End: 2019-12-08 | Stop reason: SDUPTHER

## 2019-12-07 RX ORDER — DIAZEPAM 5 MG/1
5 TABLET ORAL ONCE
Status: COMPLETED | OUTPATIENT
Start: 2019-12-07 | End: 2019-12-07

## 2019-12-07 RX ORDER — HYDROCODONE BITARTRATE AND ACETAMINOPHEN 5; 325 MG/1; MG/1
1 TABLET ORAL ONCE
Status: COMPLETED | OUTPATIENT
Start: 2019-12-07 | End: 2019-12-07

## 2019-12-07 RX ORDER — LIDOCAINE 50 MG/G
1 PATCH TOPICAL DAILY
Qty: 14 PATCH | Refills: 0 | Status: SHIPPED | OUTPATIENT
Start: 2019-12-07 | End: 2019-12-08 | Stop reason: SDUPTHER

## 2019-12-07 RX ORDER — ONDANSETRON 2 MG/ML
4 INJECTION INTRAMUSCULAR; INTRAVENOUS EVERY 6 HOURS PRN
Status: DISCONTINUED | OUTPATIENT
Start: 2019-12-07 | End: 2019-12-08 | Stop reason: HOSPADM

## 2019-12-07 RX ORDER — KETOROLAC TROMETHAMINE 30 MG/ML
30 INJECTION, SOLUTION INTRAMUSCULAR; INTRAVENOUS ONCE
Status: COMPLETED | OUTPATIENT
Start: 2019-12-07 | End: 2019-12-07

## 2019-12-07 RX ORDER — TIZANIDINE 4 MG/1
4 TABLET ORAL ONCE
Status: COMPLETED | OUTPATIENT
Start: 2019-12-07 | End: 2019-12-07

## 2019-12-07 RX ORDER — ORPHENADRINE CITRATE 30 MG/ML
60 INJECTION INTRAMUSCULAR; INTRAVENOUS ONCE
Status: COMPLETED | OUTPATIENT
Start: 2019-12-07 | End: 2019-12-07

## 2019-12-07 RX ORDER — CYCLOBENZAPRINE HCL 10 MG
10 TABLET ORAL 3 TIMES DAILY PRN
Qty: 21 TABLET | Refills: 0 | Status: SHIPPED | OUTPATIENT
Start: 2019-12-07 | End: 2019-12-08 | Stop reason: SDUPTHER

## 2019-12-07 RX ORDER — PANTOPRAZOLE SODIUM 40 MG/1
40 TABLET, DELAYED RELEASE ORAL
Status: DISCONTINUED | OUTPATIENT
Start: 2019-12-08 | End: 2019-12-08 | Stop reason: HOSPADM

## 2019-12-07 RX ORDER — AMLODIPINE BESYLATE 2.5 MG/1
2.5 TABLET ORAL DAILY
Status: DISCONTINUED | OUTPATIENT
Start: 2019-12-07 | End: 2019-12-08 | Stop reason: HOSPADM

## 2019-12-07 RX ORDER — LOSARTAN POTASSIUM 25 MG/1
25 TABLET ORAL DAILY
Status: DISCONTINUED | OUTPATIENT
Start: 2019-12-07 | End: 2019-12-08 | Stop reason: HOSPADM

## 2019-12-07 RX ORDER — ACETAMINOPHEN 325 MG/1
650 TABLET ORAL EVERY 4 HOURS PRN
Status: DISCONTINUED | OUTPATIENT
Start: 2019-12-07 | End: 2019-12-08

## 2019-12-07 RX ADMIN — OXYCODONE HYDROCHLORIDE AND ACETAMINOPHEN 1 TABLET: 5; 325 TABLET ORAL at 23:36

## 2019-12-07 RX ADMIN — ORPHENADRINE CITRATE 60 MG: 30 INJECTION INTRAMUSCULAR; INTRAVENOUS at 13:31

## 2019-12-07 RX ADMIN — HYDROCODONE BITARTRATE AND ACETAMINOPHEN 1 TABLET: 5; 325 TABLET ORAL at 19:07

## 2019-12-07 RX ADMIN — HYDROMORPHONE HYDROCHLORIDE 1 MG: 1 INJECTION, SOLUTION INTRAMUSCULAR; INTRAVENOUS; SUBCUTANEOUS at 15:16

## 2019-12-07 RX ADMIN — KETOROLAC TROMETHAMINE 30 MG: 30 INJECTION, SOLUTION INTRAMUSCULAR at 13:29

## 2019-12-07 RX ADMIN — DIAZEPAM 5 MG: 5 TABLET ORAL at 13:27

## 2019-12-07 RX ADMIN — HYDROMORPHONE HYDROCHLORIDE 1 MG: 1 INJECTION, SOLUTION INTRAMUSCULAR; INTRAVENOUS; SUBCUTANEOUS at 18:09

## 2019-12-07 RX ADMIN — HYDROMORPHONE HYDROCHLORIDE 1 MG: 1 INJECTION, SOLUTION INTRAMUSCULAR; INTRAVENOUS; SUBCUTANEOUS at 22:24

## 2019-12-07 RX ADMIN — TIZANIDINE 4 MG: 4 TABLET ORAL at 19:08

## 2019-12-07 ASSESSMENT — PAIN DESCRIPTION - LOCATION
LOCATION: BACK
LOCATION: BACK

## 2019-12-07 ASSESSMENT — PAIN DESCRIPTION - DESCRIPTORS
DESCRIPTORS: CONSTANT
DESCRIPTORS: CONSTANT

## 2019-12-07 ASSESSMENT — PAIN SCALES - GENERAL
PAINLEVEL_OUTOF10: 8
PAINLEVEL_OUTOF10: 8
PAINLEVEL_OUTOF10: 7
PAINLEVEL_OUTOF10: 8
PAINLEVEL_OUTOF10: 0
PAINLEVEL_OUTOF10: 10
PAINLEVEL_OUTOF10: 5
PAINLEVEL_OUTOF10: 4
PAINLEVEL_OUTOF10: 5
PAINLEVEL_OUTOF10: 8
PAINLEVEL_OUTOF10: 8
PAINLEVEL_OUTOF10: 5
PAINLEVEL_OUTOF10: 5

## 2019-12-07 ASSESSMENT — PAIN DESCRIPTION - ORIENTATION
ORIENTATION: LOWER
ORIENTATION: LOWER

## 2019-12-07 ASSESSMENT — PAIN DESCRIPTION - PROGRESSION
CLINICAL_PROGRESSION: RESOLVED
CLINICAL_PROGRESSION: GRADUALLY WORSENING

## 2019-12-07 ASSESSMENT — ENCOUNTER SYMPTOMS
WHEEZING: 0
BACK PAIN: 1
EYE DISCHARGE: 0
SORE THROAT: 0
ABDOMINAL PAIN: 0
SHORTNESS OF BREATH: 0
EYE REDNESS: 0
COUGH: 0
DIARRHEA: 0
RHINORRHEA: 0
NAUSEA: 0
VOMITING: 0

## 2019-12-07 ASSESSMENT — PAIN DESCRIPTION - FREQUENCY
FREQUENCY: CONTINUOUS
FREQUENCY: CONTINUOUS

## 2019-12-07 ASSESSMENT — PAIN DESCRIPTION - PAIN TYPE
TYPE: ACUTE PAIN
TYPE: ACUTE PAIN

## 2019-12-07 ASSESSMENT — PAIN DESCRIPTION - ONSET
ONSET: SUDDEN
ONSET: SUDDEN

## 2019-12-07 ASSESSMENT — PAIN - FUNCTIONAL ASSESSMENT: PAIN_FUNCTIONAL_ASSESSMENT: PREVENTS OR INTERFERES SOME ACTIVE ACTIVITIES AND ADLS

## 2019-12-08 ENCOUNTER — APPOINTMENT (OUTPATIENT)
Dept: MRI IMAGING | Age: 54
End: 2019-12-08

## 2019-12-08 VITALS
BODY MASS INDEX: 33.37 KG/M2 | WEIGHT: 260 LBS | HEIGHT: 74 IN | HEART RATE: 58 BPM | DIASTOLIC BLOOD PRESSURE: 96 MMHG | TEMPERATURE: 98.2 F | OXYGEN SATURATION: 95 % | SYSTOLIC BLOOD PRESSURE: 143 MMHG | RESPIRATION RATE: 18 BRPM

## 2019-12-08 LAB — INR BLD: 3.28 (ref 0.85–1.13)

## 2019-12-08 PROCEDURE — 72148 MRI LUMBAR SPINE W/O DYE: CPT

## 2019-12-08 PROCEDURE — 6360000002 HC RX W HCPCS: Performed by: OPHTHALMOLOGY

## 2019-12-08 PROCEDURE — 6370000000 HC RX 637 (ALT 250 FOR IP): Performed by: OPHTHALMOLOGY

## 2019-12-08 PROCEDURE — G0378 HOSPITAL OBSERVATION PER HR: HCPCS

## 2019-12-08 PROCEDURE — 6370000000 HC RX 637 (ALT 250 FOR IP): Performed by: PHARMACIST

## 2019-12-08 PROCEDURE — 6370000000 HC RX 637 (ALT 250 FOR IP): Performed by: HOSPITALIST

## 2019-12-08 PROCEDURE — 96376 TX/PRO/DX INJ SAME DRUG ADON: CPT

## 2019-12-08 PROCEDURE — 85610 PROTHROMBIN TIME: CPT

## 2019-12-08 PROCEDURE — 36415 COLL VENOUS BLD VENIPUNCTURE: CPT

## 2019-12-08 PROCEDURE — 99220 PR INITIAL OBSERVATION CARE/DAY 70 MINUTES: CPT | Performed by: NEUROLOGICAL SURGERY

## 2019-12-08 PROCEDURE — 97530 THERAPEUTIC ACTIVITIES: CPT

## 2019-12-08 PROCEDURE — 96375 TX/PRO/DX INJ NEW DRUG ADDON: CPT

## 2019-12-08 PROCEDURE — 97162 PT EVAL MOD COMPLEX 30 MIN: CPT

## 2019-12-08 PROCEDURE — 99217 PR OBSERVATION CARE DISCHARGE MANAGEMENT: CPT | Performed by: HOSPITALIST

## 2019-12-08 RX ORDER — WARFARIN SODIUM 2 MG/1
2 TABLET ORAL ONCE
Status: COMPLETED | OUTPATIENT
Start: 2019-12-08 | End: 2019-12-08

## 2019-12-08 RX ORDER — LIDOCAINE 50 MG/G
1 PATCH TOPICAL DAILY
Qty: 2 PATCH | Refills: 0 | Status: SHIPPED | OUTPATIENT
Start: 2019-12-08 | End: 2019-12-10

## 2019-12-08 RX ORDER — ACETAMINOPHEN 325 MG/1
650 TABLET ORAL EVERY 4 HOURS
Status: DISCONTINUED | OUTPATIENT
Start: 2019-12-08 | End: 2019-12-08 | Stop reason: HOSPADM

## 2019-12-08 RX ORDER — TRAMADOL HYDROCHLORIDE 50 MG/1
50 TABLET ORAL EVERY 4 HOURS PRN
Qty: 8 TABLET | Refills: 0 | Status: SHIPPED | OUTPATIENT
Start: 2019-12-08 | End: 2019-12-10

## 2019-12-08 RX ORDER — CYCLOBENZAPRINE HCL 10 MG
10 TABLET ORAL 3 TIMES DAILY PRN
Qty: 6 TABLET | Refills: 0 | Status: SHIPPED | OUTPATIENT
Start: 2019-12-08 | End: 2019-12-10

## 2019-12-08 RX ADMIN — LOSARTAN POTASSIUM 25 MG: 25 TABLET, FILM COATED ORAL at 10:05

## 2019-12-08 RX ADMIN — HYDROMORPHONE HYDROCHLORIDE 1 MG: 1 INJECTION, SOLUTION INTRAMUSCULAR; INTRAVENOUS; SUBCUTANEOUS at 06:18

## 2019-12-08 RX ADMIN — OXYCODONE HYDROCHLORIDE AND ACETAMINOPHEN 1 TABLET: 5; 325 TABLET ORAL at 04:02

## 2019-12-08 RX ADMIN — WARFARIN SODIUM 2 MG: 2 TABLET ORAL at 17:53

## 2019-12-08 RX ADMIN — IBUPROFEN 600 MG: 400 TABLET ORAL at 08:46

## 2019-12-08 RX ADMIN — CYCLOBENZAPRINE 10 MG: 10 TABLET, FILM COATED ORAL at 10:05

## 2019-12-08 RX ADMIN — IBUPROFEN 600 MG: 400 TABLET ORAL at 14:49

## 2019-12-08 RX ADMIN — AMLODIPINE BESYLATE 2.5 MG: 2.5 TABLET ORAL at 10:05

## 2019-12-08 RX ADMIN — ACETAMINOPHEN 650 MG: 325 TABLET ORAL at 14:49

## 2019-12-08 RX ADMIN — ONDANSETRON 4 MG: 2 INJECTION INTRAMUSCULAR; INTRAVENOUS at 10:05

## 2019-12-08 RX ADMIN — PANTOPRAZOLE SODIUM 40 MG: 40 TABLET, DELAYED RELEASE ORAL at 06:18

## 2019-12-08 RX ADMIN — ACETAMINOPHEN 650 MG: 325 TABLET ORAL at 17:53

## 2019-12-08 RX ADMIN — OXYCODONE HYDROCHLORIDE AND ACETAMINOPHEN 1 TABLET: 5; 325 TABLET ORAL at 08:46

## 2019-12-08 RX ADMIN — HYDROMORPHONE HYDROCHLORIDE 1 MG: 1 INJECTION, SOLUTION INTRAMUSCULAR; INTRAVENOUS; SUBCUTANEOUS at 10:32

## 2019-12-08 ASSESSMENT — PAIN DESCRIPTION - FREQUENCY
FREQUENCY: CONTINUOUS

## 2019-12-08 ASSESSMENT — ENCOUNTER SYMPTOMS
ABDOMINAL DISTENTION: 0
CHEST TIGHTNESS: 0
APNEA: 0
BACK PAIN: 1
SHORTNESS OF BREATH: 0
ABDOMINAL PAIN: 0

## 2019-12-08 ASSESSMENT — PAIN DESCRIPTION - PROGRESSION
CLINICAL_PROGRESSION: GRADUALLY WORSENING

## 2019-12-08 ASSESSMENT — PAIN DESCRIPTION - LOCATION
LOCATION: BACK

## 2019-12-08 ASSESSMENT — PAIN - FUNCTIONAL ASSESSMENT: PAIN_FUNCTIONAL_ASSESSMENT: PREVENTS OR INTERFERES SOME ACTIVE ACTIVITIES AND ADLS

## 2019-12-08 ASSESSMENT — PAIN SCALES - GENERAL
PAINLEVEL_OUTOF10: 5
PAINLEVEL_OUTOF10: 5
PAINLEVEL_OUTOF10: 6
PAINLEVEL_OUTOF10: 6
PAINLEVEL_OUTOF10: 2
PAINLEVEL_OUTOF10: 6
PAINLEVEL_OUTOF10: 5
PAINLEVEL_OUTOF10: 5
PAINLEVEL_OUTOF10: 0
PAINLEVEL_OUTOF10: 4
PAINLEVEL_OUTOF10: 5

## 2019-12-08 ASSESSMENT — PAIN DESCRIPTION - DESCRIPTORS
DESCRIPTORS: CONSTANT

## 2019-12-08 ASSESSMENT — PAIN DESCRIPTION - ONSET
ONSET: ON-GOING

## 2019-12-08 ASSESSMENT — PAIN DESCRIPTION - PAIN TYPE
TYPE: ACUTE PAIN

## 2019-12-08 ASSESSMENT — PAIN DESCRIPTION - ORIENTATION
ORIENTATION: LOWER

## 2019-12-12 DIAGNOSIS — M54.50 LOW BACK PAIN RADIATING TO RIGHT LOWER EXTREMITY: ICD-10-CM

## 2019-12-12 DIAGNOSIS — M79.604 LOW BACK PAIN RADIATING TO RIGHT LOWER EXTREMITY: ICD-10-CM

## 2019-12-12 DIAGNOSIS — M54.41 ACUTE MIDLINE LOW BACK PAIN WITH RIGHT-SIDED SCIATICA: Primary | ICD-10-CM

## 2019-12-20 ENCOUNTER — HOSPITAL ENCOUNTER (OUTPATIENT)
Dept: PHYSICAL THERAPY | Age: 54
Setting detail: THERAPIES SERIES
Discharge: HOME | End: 2019-12-20

## 2019-12-20 PROCEDURE — 97162 PT EVAL MOD COMPLEX 30 MIN: CPT

## 2019-12-20 PROCEDURE — 97530 THERAPEUTIC ACTIVITIES: CPT

## 2019-12-20 PROCEDURE — 97110 THERAPEUTIC EXERCISES: CPT

## 2019-12-20 ASSESSMENT — PAIN DESCRIPTION - PAIN TYPE: TYPE: CHRONIC PAIN

## 2019-12-20 ASSESSMENT — PAIN DESCRIPTION - LOCATION: LOCATION: BACK

## 2019-12-20 ASSESSMENT — PAIN SCALES - GENERAL: PAINLEVEL_OUTOF10: 2

## 2019-12-20 ASSESSMENT — PAIN DESCRIPTION - DESCRIPTORS: DESCRIPTORS: ACHING;TIGHTNESS

## 2019-12-20 ASSESSMENT — PAIN DESCRIPTION - FREQUENCY: FREQUENCY: INTERMITTENT

## 2019-12-20 ASSESSMENT — PAIN DESCRIPTION - ORIENTATION: ORIENTATION: LOWER;MID

## 2019-12-23 ENCOUNTER — APPOINTMENT (OUTPATIENT)
Dept: PHYSICAL THERAPY | Age: 54
End: 2019-12-23

## 2020-01-01 ENCOUNTER — VIRTUAL VISIT (OUTPATIENT)
Dept: NEUROLOGY | Age: 55
End: 2020-01-01
Payer: COMMERCIAL

## 2020-01-01 ENCOUNTER — HOSPITAL ENCOUNTER (EMERGENCY)
Age: 55
Discharge: HOME | End: 2020-11-16
Payer: COMMERCIAL

## 2020-01-01 ENCOUNTER — HOSPITAL ENCOUNTER (INPATIENT)
Age: 55
LOS: 1 days | Discharge: HOME | DRG: 103 | End: 2020-05-23
Attending: INTERNAL MEDICINE | Admitting: INTERNAL MEDICINE
Payer: COMMERCIAL

## 2020-01-01 ENCOUNTER — APPOINTMENT (OUTPATIENT)
Dept: CT IMAGING | Age: 55
DRG: 103 | End: 2020-01-01
Payer: COMMERCIAL

## 2020-01-01 ENCOUNTER — APPOINTMENT (OUTPATIENT)
Dept: MRI IMAGING | Age: 55
DRG: 103 | End: 2020-01-01
Payer: COMMERCIAL

## 2020-01-01 ENCOUNTER — CARE COORDINATION (OUTPATIENT)
Dept: CARE COORDINATION | Age: 55
End: 2020-01-01

## 2020-01-01 VITALS
HEART RATE: 61 BPM | RESPIRATION RATE: 18 BRPM | BODY MASS INDEX: 34.65 KG/M2 | OXYGEN SATURATION: 96 % | WEIGHT: 270 LBS | HEIGHT: 74 IN | SYSTOLIC BLOOD PRESSURE: 157 MMHG | TEMPERATURE: 97.7 F | DIASTOLIC BLOOD PRESSURE: 82 MMHG

## 2020-01-01 VITALS
OXYGEN SATURATION: 96 % | WEIGHT: 270 LBS | SYSTOLIC BLOOD PRESSURE: 148 MMHG | BODY MASS INDEX: 34.65 KG/M2 | HEART RATE: 66 BPM | HEIGHT: 74 IN | DIASTOLIC BLOOD PRESSURE: 94 MMHG | TEMPERATURE: 98.4 F | RESPIRATION RATE: 16 BRPM

## 2020-01-01 LAB
ABO: NORMAL
ALBUMIN SERPL-MCNC: 4 G/DL (ref 3.5–5.1)
ALBUMIN SERPL-MCNC: 4.4 G/DL (ref 3.5–5.1)
ALP BLD-CCNC: 31 U/L (ref 38–126)
ALP BLD-CCNC: 32 U/L (ref 38–126)
ALT SERPL-CCNC: 55 U/L (ref 11–66)
ALT SERPL-CCNC: 56 U/L (ref 11–66)
ANION GAP SERPL CALCULATED.3IONS-SCNC: 5 MEQ/L (ref 8–16)
ANION GAP SERPL CALCULATED.3IONS-SCNC: 8 MEQ/L (ref 8–16)
ANTIBODY SCREEN: NORMAL
AST SERPL-CCNC: 31 U/L (ref 5–40)
AST SERPL-CCNC: 35 U/L (ref 5–40)
BASOPHILS # BLD: 0.4 %
BASOPHILS # BLD: 0.7 %
BASOPHILS ABSOLUTE: 0 THOU/MM3 (ref 0–0.1)
BASOPHILS ABSOLUTE: 0 THOU/MM3 (ref 0–0.1)
BILIRUB SERPL-MCNC: 0.4 MG/DL (ref 0.3–1.2)
BILIRUB SERPL-MCNC: 0.4 MG/DL (ref 0.3–1.2)
BILIRUBIN DIRECT: < 0.2 MG/DL (ref 0–0.3)
BILIRUBIN DIRECT: < 0.2 MG/DL (ref 0–0.3)
BUN BLDV-MCNC: 13 MG/DL (ref 7–22)
BUN BLDV-MCNC: 17 MG/DL (ref 7–22)
CALCIUM SERPL-MCNC: 9 MG/DL (ref 8.5–10.5)
CALCIUM SERPL-MCNC: 9.1 MG/DL (ref 8.5–10.5)
CHLORIDE BLD-SCNC: 103 MEQ/L (ref 98–111)
CHLORIDE BLD-SCNC: 104 MEQ/L (ref 98–111)
CO2: 26 MEQ/L (ref 23–33)
CO2: 28 MEQ/L (ref 23–33)
CREAT SERPL-MCNC: 1 MG/DL (ref 0.4–1.2)
CREAT SERPL-MCNC: 1 MG/DL (ref 0.4–1.2)
EOSINOPHIL # BLD: 1.7 %
EOSINOPHIL # BLD: 1.9 %
EOSINOPHILS ABSOLUTE: 0.1 THOU/MM3 (ref 0–0.4)
EOSINOPHILS ABSOLUTE: 0.1 THOU/MM3 (ref 0–0.4)
ERYTHROCYTE [DISTWIDTH] IN BLOOD BY AUTOMATED COUNT: 13.1 % (ref 11.5–14.5)
ERYTHROCYTE [DISTWIDTH] IN BLOOD BY AUTOMATED COUNT: 13.1 % (ref 11.5–14.5)
ERYTHROCYTE [DISTWIDTH] IN BLOOD BY AUTOMATED COUNT: 43.9 FL (ref 35–45)
ERYTHROCYTE [DISTWIDTH] IN BLOOD BY AUTOMATED COUNT: 44.4 FL (ref 35–45)
GFR SERPL CREATININE-BSD FRML MDRD: 77 ML/MIN/1.73M2
GFR SERPL CREATININE-BSD FRML MDRD: 77 ML/MIN/1.73M2
GLUCOSE BLD-MCNC: 100 MG/DL (ref 70–108)
GLUCOSE BLD-MCNC: 96 MG/DL (ref 70–108)
HCT VFR BLD CALC: 45.4 % (ref 42–52)
HCT VFR BLD CALC: 45.8 % (ref 42–52)
HEMOGLOBIN: 14.7 GM/DL (ref 14–18)
HEMOGLOBIN: 15.2 GM/DL (ref 14–18)
IMMATURE GRANS (ABS): 0.01 THOU/MM3 (ref 0–0.07)
IMMATURE GRANS (ABS): 0.01 THOU/MM3 (ref 0–0.07)
IMMATURE GRANULOCYTES: 0.2 %
IMMATURE GRANULOCYTES: 0.2 %
INR BLD: 2.61 (ref 0.85–1.13)
INR BLD: 2.98 (ref 0.85–1.13)
LYMPHOCYTES # BLD: 33.2 %
LYMPHOCYTES # BLD: 40.2 %
LYMPHOCYTES ABSOLUTE: 1.4 THOU/MM3 (ref 1–4.8)
LYMPHOCYTES ABSOLUTE: 1.8 THOU/MM3 (ref 1–4.8)
MCH RBC QN AUTO: 30.3 PG (ref 26–33)
MCH RBC QN AUTO: 30.5 PG (ref 26–33)
MCHC RBC AUTO-ENTMCNC: 32.4 GM/DL (ref 32.2–35.5)
MCHC RBC AUTO-ENTMCNC: 33.2 GM/DL (ref 32.2–35.5)
MCV RBC AUTO: 92 FL (ref 80–94)
MCV RBC AUTO: 93.6 FL (ref 80–94)
MONOCYTES # BLD: 10.9 %
MONOCYTES # BLD: 9.1 %
MONOCYTES ABSOLUTE: 0.4 THOU/MM3 (ref 0.4–1.3)
MONOCYTES ABSOLUTE: 0.5 THOU/MM3 (ref 0.4–1.3)
NUCLEATED RED BLOOD CELLS: 0 /100 WBC
NUCLEATED RED BLOOD CELLS: 0 /100 WBC
OSMOLALITY CALCULATION: 277.3 MOSMOL/KG (ref 275–300)
PLATELET # BLD: 172 THOU/MM3 (ref 130–400)
PLATELET # BLD: 179 THOU/MM3 (ref 130–400)
PMV BLD AUTO: 10.4 FL (ref 9.4–12.4)
PMV BLD AUTO: 10.6 FL (ref 9.4–12.4)
POTASSIUM REFLEX MAGNESIUM: 4.5 MEQ/L (ref 3.5–5.2)
POTASSIUM SERPL-SCNC: 4.7 MEQ/L (ref 3.5–5.2)
RBC # BLD: 4.85 MILL/MM3 (ref 4.7–6.1)
RBC # BLD: 4.98 MILL/MM3 (ref 4.7–6.1)
RH FACTOR: NORMAL
SARS-COV-2: DETECTED
SEG NEUTROPHILS: 48.4 %
SEG NEUTROPHILS: 53.1 %
SEGMENTED NEUTROPHILS ABSOLUTE COUNT: 2.2 THOU/MM3 (ref 1.8–7.7)
SEGMENTED NEUTROPHILS ABSOLUTE COUNT: 2.3 THOU/MM3 (ref 1.8–7.7)
SODIUM BLD-SCNC: 136 MEQ/L (ref 135–145)
SODIUM BLD-SCNC: 138 MEQ/L (ref 135–145)
TOTAL PROTEIN: 6.5 G/DL (ref 6.1–8)
TOTAL PROTEIN: 7.1 G/DL (ref 6.1–8)
WBC # BLD: 4.3 THOU/MM3 (ref 4.8–10.8)
WBC # BLD: 4.6 THOU/MM3 (ref 4.8–10.8)

## 2020-01-01 PROCEDURE — 86850 RBC ANTIBODY SCREEN: CPT

## 2020-01-01 PROCEDURE — 2580000003 HC RX 258: Performed by: INTERNAL MEDICINE

## 2020-01-01 PROCEDURE — 6370000000 HC RX 637 (ALT 250 FOR IP): Performed by: PSYCHIATRY & NEUROLOGY

## 2020-01-01 PROCEDURE — 80048 BASIC METABOLIC PNL TOTAL CA: CPT

## 2020-01-01 PROCEDURE — 99213 OFFICE O/P EST LOW 20 MIN: CPT | Performed by: NURSE PRACTITIONER

## 2020-01-01 PROCEDURE — 70551 MRI BRAIN STEM W/O DYE: CPT

## 2020-01-01 PROCEDURE — 70496 CT ANGIOGRAPHY HEAD: CPT

## 2020-01-01 PROCEDURE — 6370000000 HC RX 637 (ALT 250 FOR IP): Performed by: INTERNAL MEDICINE

## 2020-01-01 PROCEDURE — 82248 BILIRUBIN DIRECT: CPT

## 2020-01-01 PROCEDURE — 2060000000 HC ICU INTERMEDIATE R&B

## 2020-01-01 PROCEDURE — 70450 CT HEAD/BRAIN W/O DYE: CPT

## 2020-01-01 PROCEDURE — 80053 COMPREHEN METABOLIC PANEL: CPT

## 2020-01-01 PROCEDURE — 86900 BLOOD TYPING SEROLOGIC ABO: CPT

## 2020-01-01 PROCEDURE — 86901 BLOOD TYPING SEROLOGIC RH(D): CPT

## 2020-01-01 PROCEDURE — 36415 COLL VENOUS BLD VENIPUNCTURE: CPT

## 2020-01-01 PROCEDURE — 96374 THER/PROPH/DIAG INJ IV PUSH: CPT

## 2020-01-01 PROCEDURE — 6360000004 HC RX CONTRAST MEDICATION: Performed by: NURSE PRACTITIONER

## 2020-01-01 PROCEDURE — 85025 COMPLETE CBC W/AUTO DIFF WBC: CPT

## 2020-01-01 PROCEDURE — 99285 EMERGENCY DEPT VISIT HI MDM: CPT

## 2020-01-01 PROCEDURE — 99223 1ST HOSP IP/OBS HIGH 75: CPT | Performed by: INTERNAL MEDICINE

## 2020-01-01 PROCEDURE — 94760 N-INVAS EAR/PLS OXIMETRY 1: CPT

## 2020-01-01 PROCEDURE — 99223 1ST HOSP IP/OBS HIGH 75: CPT | Performed by: PSYCHIATRY & NEUROLOGY

## 2020-01-01 PROCEDURE — 96375 TX/PRO/DX INJ NEW DRUG ADDON: CPT

## 2020-01-01 PROCEDURE — 6360000002 HC RX W HCPCS: Performed by: PHYSICIAN ASSISTANT

## 2020-01-01 PROCEDURE — 2580000003 HC RX 258: Performed by: PHYSICIAN ASSISTANT

## 2020-01-01 PROCEDURE — 80076 HEPATIC FUNCTION PANEL: CPT

## 2020-01-01 PROCEDURE — 99213 OFFICE O/P EST LOW 20 MIN: CPT

## 2020-01-01 PROCEDURE — 99232 SBSQ HOSP IP/OBS MODERATE 35: CPT | Performed by: PSYCHIATRY & NEUROLOGY

## 2020-01-01 PROCEDURE — 70498 CT ANGIOGRAPHY NECK: CPT

## 2020-01-01 PROCEDURE — 99239 HOSP IP/OBS DSCHRG MGMT >30: CPT | Performed by: FAMILY MEDICINE

## 2020-01-01 PROCEDURE — U0003 INFECTIOUS AGENT DETECTION BY NUCLEIC ACID (DNA OR RNA); SEVERE ACUTE RESPIRATORY SYNDROME CORONAVIRUS 2 (SARS-COV-2) (CORONAVIRUS DISEASE [COVID-19]), AMPLIFIED PROBE TECHNIQUE, MAKING USE OF HIGH THROUGHPUT TECHNOLOGIES AS DESCRIBED BY CMS-2020-01-R: HCPCS

## 2020-01-01 PROCEDURE — 85610 PROTHROMBIN TIME: CPT

## 2020-01-01 PROCEDURE — 96376 TX/PRO/DX INJ SAME DRUG ADON: CPT

## 2020-01-01 RX ORDER — SODIUM CHLORIDE 0.9 % (FLUSH) 0.9 %
10 SYRINGE (ML) INJECTION EVERY 12 HOURS SCHEDULED
Status: DISCONTINUED | OUTPATIENT
Start: 2020-01-01 | End: 2020-01-01 | Stop reason: HOSPADM

## 2020-01-01 RX ORDER — MAGNESIUM SULFATE IN WATER 40 MG/ML
2 INJECTION, SOLUTION INTRAVENOUS PRN
Status: DISCONTINUED | OUTPATIENT
Start: 2020-01-01 | End: 2020-01-01 | Stop reason: HOSPADM

## 2020-01-01 RX ORDER — FAMOTIDINE 20 MG/1
20 TABLET, FILM COATED ORAL 2 TIMES DAILY
Status: DISCONTINUED | OUTPATIENT
Start: 2020-01-01 | End: 2020-01-01 | Stop reason: ALTCHOICE

## 2020-01-01 RX ORDER — ONDANSETRON 2 MG/ML
4 INJECTION INTRAMUSCULAR; INTRAVENOUS EVERY 6 HOURS PRN
Status: DISCONTINUED | OUTPATIENT
Start: 2020-01-01 | End: 2020-01-01 | Stop reason: HOSPADM

## 2020-01-01 RX ORDER — 0.9 % SODIUM CHLORIDE 0.9 %
1000 INTRAVENOUS SOLUTION INTRAVENOUS ONCE
Status: COMPLETED | OUTPATIENT
Start: 2020-01-01 | End: 2020-01-01

## 2020-01-01 RX ORDER — ACETAMINOPHEN 650 MG/1
650 SUPPOSITORY RECTAL EVERY 6 HOURS PRN
Status: DISCONTINUED | OUTPATIENT
Start: 2020-01-01 | End: 2020-01-01 | Stop reason: HOSPADM

## 2020-01-01 RX ORDER — SODIUM CHLORIDE 9 MG/ML
INJECTION, SOLUTION INTRAVENOUS CONTINUOUS
Status: DISCONTINUED | OUTPATIENT
Start: 2020-01-01 | End: 2020-01-01 | Stop reason: HOSPADM

## 2020-01-01 RX ORDER — DIVALPROEX SODIUM 500 MG/1
500 TABLET, EXTENDED RELEASE ORAL 2 TIMES DAILY
Qty: 60 TABLET | Refills: 3 | Status: SHIPPED | OUTPATIENT
Start: 2020-01-01 | End: 2020-01-01

## 2020-01-01 RX ORDER — POTASSIUM CHLORIDE 7.45 MG/ML
10 INJECTION INTRAVENOUS PRN
Status: DISCONTINUED | OUTPATIENT
Start: 2020-01-01 | End: 2020-01-01 | Stop reason: HOSPADM

## 2020-01-01 RX ORDER — SODIUM CHLORIDE 0.9 % (FLUSH) 0.9 %
10 SYRINGE (ML) INJECTION PRN
Status: DISCONTINUED | OUTPATIENT
Start: 2020-01-01 | End: 2020-01-01 | Stop reason: HOSPADM

## 2020-01-01 RX ORDER — LOSARTAN POTASSIUM 50 MG/1
50 TABLET ORAL DAILY
Qty: 30 TABLET | Refills: 0 | Status: SHIPPED | OUTPATIENT
Start: 2020-01-01 | End: 2021-01-01

## 2020-01-01 RX ORDER — POLYETHYLENE GLYCOL 3350 17 G/17G
17 POWDER, FOR SOLUTION ORAL DAILY
Qty: 527 G | Refills: 0 | Status: SHIPPED | OUTPATIENT
Start: 2020-01-01 | End: 2020-01-01

## 2020-01-01 RX ORDER — HYDRALAZINE HYDROCHLORIDE 25 MG/1
25 TABLET, FILM COATED ORAL EVERY 8 HOURS SCHEDULED
Status: DISCONTINUED | OUTPATIENT
Start: 2020-01-01 | End: 2020-01-01 | Stop reason: HOSPADM

## 2020-01-01 RX ORDER — POLYETHYLENE GLYCOL 3350 17 G/17G
17 POWDER, FOR SOLUTION ORAL DAILY
Status: DISCONTINUED | OUTPATIENT
Start: 2020-01-01 | End: 2020-01-01 | Stop reason: HOSPADM

## 2020-01-01 RX ORDER — ONDANSETRON 2 MG/ML
4 INJECTION INTRAMUSCULAR; INTRAVENOUS ONCE
Status: COMPLETED | OUTPATIENT
Start: 2020-01-01 | End: 2020-01-01

## 2020-01-01 RX ORDER — DIVALPROEX SODIUM 500 MG/1
500 TABLET, EXTENDED RELEASE ORAL 2 TIMES DAILY
Qty: 30 TABLET | Refills: 0 | Status: SHIPPED | OUTPATIENT
Start: 2020-01-01 | End: 2020-01-01 | Stop reason: SDUPTHER

## 2020-01-01 RX ORDER — PROMETHAZINE HYDROCHLORIDE 25 MG/1
12.5 TABLET ORAL EVERY 6 HOURS PRN
Status: DISCONTINUED | OUTPATIENT
Start: 2020-01-01 | End: 2020-01-01 | Stop reason: HOSPADM

## 2020-01-01 RX ORDER — 0.9 % SODIUM CHLORIDE 0.9 %
20 INTRAVENOUS SOLUTION INTRAVENOUS ONCE
Status: DISCONTINUED | OUTPATIENT
Start: 2020-01-01 | End: 2020-01-01

## 2020-01-01 RX ORDER — PANTOPRAZOLE SODIUM 40 MG/1
40 TABLET, DELAYED RELEASE ORAL
Status: DISCONTINUED | OUTPATIENT
Start: 2020-01-01 | End: 2020-01-01 | Stop reason: HOSPADM

## 2020-01-01 RX ORDER — DIVALPROEX SODIUM 500 MG/1
500 TABLET, EXTENDED RELEASE ORAL 2 TIMES DAILY
Status: DISCONTINUED | OUTPATIENT
Start: 2020-01-01 | End: 2020-01-01 | Stop reason: HOSPADM

## 2020-01-01 RX ORDER — POTASSIUM CHLORIDE 20 MEQ/1
40 TABLET, EXTENDED RELEASE ORAL PRN
Status: DISCONTINUED | OUTPATIENT
Start: 2020-01-01 | End: 2020-01-01 | Stop reason: HOSPADM

## 2020-01-01 RX ORDER — ACETAMINOPHEN 325 MG/1
650 TABLET ORAL EVERY 6 HOURS PRN
Status: DISCONTINUED | OUTPATIENT
Start: 2020-01-01 | End: 2020-01-01 | Stop reason: HOSPADM

## 2020-01-01 RX ORDER — LOSARTAN POTASSIUM 25 MG/1
25 TABLET ORAL DAILY
Status: DISCONTINUED | OUTPATIENT
Start: 2020-01-01 | End: 2020-01-01 | Stop reason: HOSPADM

## 2020-01-01 RX ADMIN — IOPAMIDOL 80 ML: 755 INJECTION, SOLUTION INTRAVENOUS at 15:28

## 2020-01-01 RX ADMIN — ONDANSETRON HYDROCHLORIDE 4 MG: 2 SOLUTION INTRAMUSCULAR; INTRAVENOUS at 10:36

## 2020-01-01 RX ADMIN — ACETAMINOPHEN 650 MG: 325 TABLET ORAL at 16:14

## 2020-01-01 RX ADMIN — PANTOPRAZOLE SODIUM 40 MG: 40 TABLET, DELAYED RELEASE ORAL at 16:14

## 2020-01-01 RX ADMIN — LOSARTAN POTASSIUM 25 MG: 25 TABLET, FILM COATED ORAL at 08:27

## 2020-01-01 RX ADMIN — HYDROMORPHONE HYDROCHLORIDE 0.5 MG: 1 INJECTION, SOLUTION INTRAMUSCULAR; INTRAVENOUS; SUBCUTANEOUS at 12:30

## 2020-01-01 RX ADMIN — HYDROMORPHONE HYDROCHLORIDE 0.5 MG: 1 INJECTION, SOLUTION INTRAMUSCULAR; INTRAVENOUS; SUBCUTANEOUS at 10:36

## 2020-01-01 RX ADMIN — SODIUM CHLORIDE 1000 ML: 9 INJECTION, SOLUTION INTRAVENOUS at 10:35

## 2020-01-01 RX ADMIN — HYDRALAZINE HYDROCHLORIDE 25 MG: 25 TABLET, FILM COATED ORAL at 19:39

## 2020-01-01 RX ADMIN — HYDRALAZINE HYDROCHLORIDE 25 MG: 25 TABLET, FILM COATED ORAL at 13:34

## 2020-01-01 RX ADMIN — Medication 10 ML: at 19:39

## 2020-01-01 RX ADMIN — DIVALPROEX SODIUM 500 MG: 500 TABLET, EXTENDED RELEASE ORAL at 08:27

## 2020-01-01 RX ADMIN — PANTOPRAZOLE SODIUM 40 MG: 40 TABLET, DELAYED RELEASE ORAL at 06:18

## 2020-01-01 RX ADMIN — SODIUM CHLORIDE: 9 INJECTION, SOLUTION INTRAVENOUS at 08:29

## 2020-01-01 RX ADMIN — DIVALPROEX SODIUM 500 MG: 500 TABLET, EXTENDED RELEASE ORAL at 19:43

## 2020-01-01 RX ADMIN — POLYETHYLENE GLYCOL (3350) 17 G: 17 POWDER, FOR SOLUTION ORAL at 16:24

## 2020-01-01 RX ADMIN — SODIUM CHLORIDE: 9 INJECTION, SOLUTION INTRAVENOUS at 19:40

## 2020-01-01 RX ADMIN — HYDRALAZINE HYDROCHLORIDE 25 MG: 25 TABLET, FILM COATED ORAL at 06:18

## 2020-01-01 ASSESSMENT — PAIN DESCRIPTION - FREQUENCY
FREQUENCY: CONTINUOUS

## 2020-01-01 ASSESSMENT — ENCOUNTER SYMPTOMS
SORE THROAT: 0
RHINORRHEA: 0
NAUSEA: 0
ABDOMINAL PAIN: 0
DIARRHEA: 0
SINUS PRESSURE: 1
PHOTOPHOBIA: 0
COUGH: 0
NAUSEA: 1
VOMITING: 0
RESPIRATORY NEGATIVE: 1
SINUS PAIN: 1
VOMITING: 0
COUGH: 1
EYES NEGATIVE: 1
SHORTNESS OF BREATH: 0
DIARRHEA: 0
WHEEZING: 0
SORE THROAT: 0
EYE PAIN: 0
SINUS PRESSURE: 0
CHEST TIGHTNESS: 0
SHORTNESS OF BREATH: 0

## 2020-01-01 ASSESSMENT — PAIN DESCRIPTION - DESCRIPTORS
DESCRIPTORS: ACHING
DESCRIPTORS: ACHING;DULL;THROBBING
DESCRIPTORS: DULL;ACHING;THROBBING
DESCRIPTORS: ACHING
DESCRIPTORS: HEADACHE
DESCRIPTORS: CONSTANT;THROBBING

## 2020-01-01 ASSESSMENT — PAIN SCALES - GENERAL
PAINLEVEL_OUTOF10: 5
PAINLEVEL_OUTOF10: 2
PAINLEVEL_OUTOF10: 0
PAINLEVEL_OUTOF10: 2
PAINLEVEL_OUTOF10: 5
PAINLEVEL_OUTOF10: 0
PAINLEVEL_OUTOF10: 1
PAINLEVEL_OUTOF10: 2
PAINLEVEL_OUTOF10: 3
PAINLEVEL_OUTOF10: 0

## 2020-01-01 ASSESSMENT — PAIN DESCRIPTION - PROGRESSION
CLINICAL_PROGRESSION: GRADUALLY IMPROVING
CLINICAL_PROGRESSION: GRADUALLY WORSENING
CLINICAL_PROGRESSION: GRADUALLY IMPROVING
CLINICAL_PROGRESSION: GRADUALLY IMPROVING
CLINICAL_PROGRESSION: NOT CHANGED

## 2020-01-01 ASSESSMENT — PAIN DESCRIPTION - LOCATION
LOCATION: HEAD

## 2020-01-01 ASSESSMENT — PAIN DESCRIPTION - ORIENTATION
ORIENTATION: POSTERIOR

## 2020-01-01 ASSESSMENT — PAIN DESCRIPTION - ONSET
ONSET: ON-GOING
ONSET: SUDDEN

## 2020-01-01 ASSESSMENT — PAIN DESCRIPTION - PAIN TYPE
TYPE: ACUTE PAIN

## 2020-01-01 ASSESSMENT — PAIN - FUNCTIONAL ASSESSMENT: PAIN_FUNCTIONAL_ASSESSMENT: ACTIVITIES ARE NOT PREVENTED

## 2020-01-02 ENCOUNTER — HOSPITAL ENCOUNTER (OUTPATIENT)
Dept: PHYSICAL THERAPY | Age: 55
Setting detail: THERAPIES SERIES
Discharge: HOME | End: 2020-01-02
Payer: COMMERCIAL

## 2020-01-02 NOTE — PROGRESS NOTES
[facility]  PHYSICAL THERAPY MISSED TREATMENT NOTE  OUTPATIENT REHABILITATION    Date: 2020  Patient Name: Timmothy Moritz        MRN: [id number]   : 1965  (62 y.o.)  Gender: male           PT Visit Information  No Show: 1    REASON FOR MISSED TREATMENT:  No Show/No Call. Patient was called with next scheduled appointment. Left message on vm and of 2 no show policy-will discharge, if no show next visit.      Myles Canavan [id number]

## 2020-01-03 ENCOUNTER — OFFICE VISIT (OUTPATIENT)
Dept: NEUROSURGERY | Age: 55
End: 2020-01-03
Payer: COMMERCIAL

## 2020-01-03 VITALS
WEIGHT: 265.2 LBS | HEART RATE: 68 BPM | DIASTOLIC BLOOD PRESSURE: 99 MMHG | HEIGHT: 74 IN | SYSTOLIC BLOOD PRESSURE: 150 MMHG | BODY MASS INDEX: 34.03 KG/M2

## 2020-01-03 PROCEDURE — 99213 OFFICE O/P EST LOW 20 MIN: CPT | Performed by: PHYSICIAN ASSISTANT

## 2020-01-03 NOTE — PROGRESS NOTES
placed in this encounter. ·      Assessment/Plan:  · Status Post continued evaluation of low back pain. · Doing very well overall  · Encouraged gradual increase in physical and mental activity. · Fall precaution and home safety education provided to patient.   · Follow-up: PRN      Electronically signed by Toy Farrar PA-C on 1/3/20 at 1:17 PM

## 2020-05-22 PROBLEM — Z79.01 ANTICOAGULATED: Status: ACTIVE | Noted: 2020-01-01

## 2020-05-22 PROBLEM — Z86.711 HX OF PULMONARY EMBOLUS: Status: ACTIVE | Noted: 2020-01-01

## 2020-05-22 PROBLEM — G44.53 THUNDERCLAP HEADACHE: Status: ACTIVE | Noted: 2020-01-01

## 2020-05-22 PROBLEM — I10 ESSENTIAL HYPERTENSION: Status: ACTIVE | Noted: 2020-01-01

## 2020-05-22 PROBLEM — Z86.718 HISTORY OF DVT (DEEP VEIN THROMBOSIS): Status: ACTIVE | Noted: 2020-01-01

## 2020-05-22 NOTE — LETTER
[facility]  [address] [place] [id number]  Phone: [phone]             May 23, 2020    Patient: Theodore Vuong   YOB: 1965   Date of Visit: 5/22/2020       To Whom It May Concern:    Theodore Vuong was seen and treated in our facility  beginning 5/22/2020 until . He may return to work on 5/24/2020.       Sincerely,       Shirley Burger RN         Signature:__________________________________

## 2020-05-22 NOTE — CONSULTS
Musculoskeletal: Has no hand arthritis, no limitation of ROM in any of the four extremities. no joint tenderness, deformity or swelling. There is +2 leg edema. There is venous stasis in the lower extremities noted. The Heart was regular in rate and rhythm. No heart murmur  Chest- Clear, good effort. Abdomen: soft, intact bowel sounds. Labs:    CBC:   Recent Labs     05/22/20  0946   WBC 4.3*   HGB 15.2      MCV 92.0   MCH 30.5   MCHC 33.2     CMP:  Recent Labs     05/22/20  0946      K 4.7      CO2 26   BUN 17   CREATININE 1.0   LABGLOM 77*   GLUCOSE 100   CALCIUM 9.1     Liver:   Recent Labs     05/22/20  0946   AST 35   ALT 55   ALKPHOS 32*   PROT 7.1   LABALBU 4.4   BILITOT 0.4       Results for orders placed during the hospital encounter of 05/22/20   CTA HEAD W WO CONTRAST    Narrative PROCEDURE: CTA HEAD W WO CONTRAST, CTA NECK W WO CONTRAST    CLINICAL INFORMATION: thunderclap headache, dizziness, nausea, on coumadin. COMPARISON: Head CT and brain MRI 5/22/2020. TECHNIQUE: 1 mm axial images were obtained through the head and neck after the fast bolus administration of contrast. A noncontrast localizer was obtained. 3-D reconstructions were performed on a dedicated 3-D workstation. These include multiplanar MPR   images and multiplanar MIP images. Centerline reconstructions were obtained of the carotid systems. Isovue intravenous contrast was given. All CT scans at this facility use dose modulation, iterative reconstruction, and/or weight-based dosing when appropriate to reduce radiation dose to as low as reasonably achievable. FINDINGS:      CTA NECK:    Aortic arch and branches: Aortic arch is within acceptable limits. There is no stenosis at the common origin of innominate artery left common carotid artery. There is no stenosis of either subclavian artery. Right common carotid artery/ICA:  The right common carotid artery is normal. There is minimal recognition technology. **    Final report electronically signed by Dr. Marilyn Hines on 5/22/2020 4:00 PM    I reviewed the CTA head and neck and agree with interpretation. Results for orders placed during the hospital encounter of 05/22/20   CTA NECK W WO CONTRAST    Narrative PROCEDURE: CTA HEAD W WO CONTRAST, CTA NECK W WO CONTRAST    CLINICAL INFORMATION: thunderclap headache, dizziness, nausea, on coumadin. COMPARISON: Head CT and brain MRI 5/22/2020. TECHNIQUE: 1 mm axial images were obtained through the head and neck after the fast bolus administration of contrast. A noncontrast localizer was obtained. 3-D reconstructions were performed on a dedicated 3-D workstation. These include multiplanar MPR   images and multiplanar MIP images. Centerline reconstructions were obtained of the carotid systems. Isovue intravenous contrast was given. All CT scans at this facility use dose modulation, iterative reconstruction, and/or weight-based dosing when appropriate to reduce radiation dose to as low as reasonably achievable. FINDINGS:      CTA NECK:    Aortic arch and branches: Aortic arch is within acceptable limits. There is no stenosis at the common origin of innominate artery left common carotid artery. There is no stenosis of either subclavian artery. Right common carotid artery/ICA: The right common carotid artery is normal. There is minimal atherosclerosis of the right carotid bulb. There is no stenosis. The right internal carotid artery is tortuous and redundant. There is no stenosis. Left common carotid artery/ICA: The left common carotid artery is normal. There is minimal atherosclerosis of the left carotid bulb. There is no stenosis. The left internal carotid artery is tortuous and redundant. There is no stenosis. Vertebral arteries: The vertebral arteries are codominant and normal.        CTA HEAD:      Internal carotid arteries:  There is mild calcified atherosclerosis of the cavernous segments of the internal carotid arteries bilaterally. There is no stenosis. Middle cerebral arteries: Normal. The proximal branches are also normal.    Anterior cerebral arteries: Normal. The proximal branches are normal. There is a normal small anterior communicating artery. Vertebral arteries: The vertebral arteries are normal.    Basilar artery: Normal.    Superior cerebellar arteries: Normal.    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 The P2 segment is normal. There is a normal left posterior communicating artery. The right posterior cerebral artery is normal.        No aneurysms, stenoses or occlusions are noted. The superior sagittal sinus, vein of Abad, internal cerebral veins, straight sinus, transverse sinuses and sigmoid sinuses are patent. Axial source data: There are old healed right upper rib fractures. There are no suspicious findings the lung apices. There is no cervical adenopathy. There is a small old infarct in the left frontal lobe and inferior left basal ganglia. The paranasal   sinuses are normally aerated. Impression    1. No evidence of stenosis of either carotid bulb or internal carotid artery. 2. No intracranial stenoses or occlusions. No aneurysms. **This report has been created using voice recognition software. It may contain minor errors which are inherent in voice recognition technology. **    Final report electronically signed by Dr. Naif Mott on 5/22/2020 4:00 PM    I reviewed the MRI  brain and agree with interpretation. Results for orders placed during the hospital encounter of 05/22/20   MRI Brain WO Contrast    Narrative PROCEDURE: MRI BRAIN WO CONTRAST    CLINICAL INFORMATION thunderclap headache, dizziness, nausea, on coumadin. Sudden onset headache. Dizziness and nausea since this morning. COMPARISON: Head CT 5/22/2020.     TECHNIQUE: Multiplanar and multiple spin echo MRI images were obtained of the brain without contrast.    FINDINGS:        The diffusion-weighted images are normal.  The brain volume is normal. There is an old lacunar type infarct in the head of the caudate nucleus on the left. This is noted inferiorly. There are few small scattered foci in the corona radiata on the left. There is a small old cortical infarct in the left frontal lobe. There is a very small old cortical infarct in the left occipital lobe. There is no abnormal signal in the right cerebral hemisphere. The cerebellum and cerebellopontine angles are within   acceptable limits. There are no intra-or extra-axial collections. There is no hydrocephalus, midline shift or mass effect. There is no susceptibility artifact in the brain. The major intracranial vascular flow voids are present. The midline craniocervical junction structures are normal.  The pituitary gland and brainstem are normal.            Impression    1. No evidence of an acute infarct. 2. Small old infarcts in the left cerebral hemisphere. These are in the left frontal lobe, left occipital lobe and head of the caudate nucleus on the left. **This report has been created using voice recognition software. It may contain minor errors which are inherent in voice recognition technology. **    Final report electronically signed by Dr. Christie Marcus on 5/22/2020 2:35 PM     Reviewed   Results for orders placed during the hospital encounter of 05/22/20   CT HEAD WO CONTRAST    Narrative PROCEDURE: NONCONTRAST CT BRAIN    CLINICAL INFORMATION: thunderclap headache    COMPARISON: No prior study    TECHNIQUE: Multiple axial 5 mm images of the brain were obtained without administration of intravenous contrast material. ALL CT SCANS AT THIS FACILITY use dose modulation, iterative reconstruction, and/or weight-based dosing when appropriate to reduce   radiation dose to as low as reasonably achievable.       FINDINGS:    Lateral, third, fourth ventricles: Normal in size, contour, position. .. Parenchyma:  No acute infarction, mass lesion, or intracranial hemorrhage is seen. There is a wedge-shaped focus of diminished attenuation in the left parietal lobe anteriorly with some mild associated encephalomalacia, consistent with old infarct. Is   also a second focus of old infarction in the left basal ganglia region anteriorly. Mastoid processes: Well aerated. Normal in appearance. .       Paranasal sinuses/calvarium: Unremarkable        Impression No acute intracranial process. **This report has been created using voice recognition software. It may contain minor errors which are inherent in voice recognition technology. **    Final report electronically signed by Dr. Afshin March on 5/22/2020 10:28 AM         We reviewed the patient records and available information in the EHR       Impression:    Principal Problem: Thunderclap headache  Active Problems:    Essential hypertension    Anticoagulated    Hx of pulmonary embolus    History of DVT (deep vein thrombosis)  Resolved Problems:    * No resolved hospital problems. *  This is a 60year old male who presents with severe headache that woke him from sleep this morning. He has history of DVT in the past and is on coumadin. He does have history of migraine headache. He underwent MRI brain WO contrast as well as CTA head and neck W/WO contrast that showed no acute findings. He does report he has been under increased stress recently. On exam, his neck is supple, his exam is nonfocal. His symptoms are probably migrainous in nature. We will start him on Depakote  mg two times a day. The patients exam is benign,does not appear to be in distress, his neck is supple, he has history of migraine, MRI brain and CTA head and neck show no concerning findings. Would defer the LP at this angeline. After detailed discussion with patient we agreed on the following plan.      Recommendations:

## 2020-05-22 NOTE — ED PROVIDER NOTES
sinus pressure and sore throat. Eyes: Negative for photophobia, pain and visual disturbance. Respiratory: Negative for cough and shortness of breath. Cardiovascular: Negative for chest pain. Gastrointestinal: Positive for nausea. Negative for abdominal pain, diarrhea and vomiting. Endocrine: Negative for polyuria. Genitourinary: Negative for difficulty urinating and frequency. Musculoskeletal: Positive for neck pain. Negative for gait problem and neck stiffness. Skin: Negative for rash. Neurological: Positive for dizziness and headaches. Negative for facial asymmetry, speech difficulty, weakness, light-headedness and numbness. Psychiatric/Behavioral: Negative for confusion and sleep disturbance. PAST MEDICAL HISTORY    has a past medical history of Cerebral artery occlusion with cerebral infarction [facility]), Deep vein blood clot of left lower extremity ([facility] [address] Headache, Hyperlipidemia, Hypertension, Kidney stone, Lymphedema of both lower extremities, and Sciatica of left side. SURGICAL HISTORY      has a past surgical history that includes Nasal septum surgery; Cholecystectomy; Endoscopic ultrasonography, GI; IVC filter insertion; Colonoscopy; and Endoscopy, colon, diagnostic. CURRENT MEDICATIONS       Previous Medications    AMLODIPINE (NORVASC) 2.5 MG TABLET    Take 5 mg by mouth daily     CHOLECALCIFEROL (VITAMIN D) 2000 UNITS CAPS CAPSULE    Take 2 capsules by mouth daily    LOSARTAN (COZAAR) 25 MG TABLET    Take 25 mg by mouth daily    WARFARIN (COUMADIN) 4 MG TABLET    Take 4 mg by mouth daily       ALLERGIES     is allergic to morphine and neurontin [gabapentin]. FAMILY HISTORY     He indicated that his mother is alive. He indicated that his father is . He indicated that his maternal grandmother is . He indicated that his maternal grandfather is . He indicated that his paternal grandmother is .  He indicated that his paternal grandfather is Palpations: Abdomen is soft. Abdomen is not rigid. Tenderness: There is no abdominal tenderness. There is no guarding. Musculoskeletal: Normal range of motion. Cervical back: He exhibits pain (with movement ). Comments: Extremities well perfused; Good strength appreciated in all muscle groups. Lymphadenopathy:      Cervical: No cervical adenopathy. Skin:     General: Skin is warm and dry. Coloration: Skin is not pale. Findings: No bruising or rash. Neurological:      Mental Status: He is alert and oriented to person, place, and time. GCS: GCS eye subscore is 4. GCS verbal subscore is 5. GCS motor subscore is 6. Cranial Nerves: No cranial nerve deficit. Sensory: No sensory deficit. Gait: Gait normal.      Comments: Cranial nerves II-XII intact. Psychiatric:         Speech: Speech normal.         Behavior: Behavior normal. Behavior is cooperative. Thought Content: Thought content normal.         DIFFERENTIAL DIAGNOSIS:   Including but not limited to: supratherapeutic INR, tension headache, ICH, migraine headache, less likely but considered meningitis. DIAGNOSTIC RESULTS     EKG: All EKG's are [facility] by [facility] who either signs or Co-signs this chart in the absence of a cardiologist.  None     RADIOLOGY: non-plain film images(s) such as CT,Ultrasound and MRI are read by the radiologist.  Plain radiographic images are visualized and preliminarily interpreted by the emergency physician unless otherwise stated below. CT HEAD WO CONTRAST   Final Result   No acute intracranial process. **This report has been created using voice recognition software. It may contain minor errors which are inherent in voice recognition technology. **      Final report electronically signed by Dr. Krystyna Cameron on 5/22/2020 10:28 AM          LABS:   Labs Reviewed   CBC WITH AUTO DIFFERENTIAL - Abnormal; Notable for the following components:       Result Value    WBC 4.3 (*)     All other components within normal limits   PROTIME-INR - Abnormal; Notable for the following components:    INR 2.61 (*)     All other components within normal limits   GLOMERULAR FILTRATION RATE, ESTIMATED - Abnormal; Notable for the following components:    Est, Glom Filt Rate 77 (*)     All other components within normal limits   BASIC METABOLIC PANEL   ANION GAP   OSMOLALITY   HEPATIC FUNCTION PANEL       EMERGENCY DEPARTMENT COURSE:   Vitals:    Vitals:    05/22/20 0931 05/22/20 0939 05/22/20 1039 05/22/20 1146   BP: (!) 165/98  (!) 154/93 (!) 149/94   Pulse: 58 64 58 57   Resp: 16  16 16   Temp: 98 °F (36.7 °C)      TempSrc: Oral      SpO2: 98%  97% 95%   Weight: 270 lb (122.5 kg)      Height: 6' 2\" (1.88 m)        0946: IV line and labs. 1006: More labs, CT head without contrast, IV fluid bolus, Dilaudid, and Zofran ordered. 1028: CT head without contrast showed No acute intracranial process. 1223: Dilaudid ordered. MDM:  The patient was seen by me in the emergency department for abrupt onset headache waking him from sleep. Physical exam revealed a neurologically intact 57-year-old man who was nontoxic-appearing. He had appeared uncomfortable. Vital signs reviewed and noted stable. Old records were reviewed. Appropriate laboratory and imaging studies were ordered and reviewed upon completion. They were negative for acute abnormality. INR was noted therapeutic at 2.61. Patient clearly needed lumbar puncture which was not advisable given his coagulopathy. I discussed this patient with my attending physician, Dr. Fabienne Lucas, who advised admission. I discussed results, plan of care with the patient, and he was amenable. Dr. Saman Tejada of our hospitalist service was consulted and graciously accepted admission. The patient was admitted to the hospital in fair condition.     CRITICAL CARE:   None    CONSULTS:  Dr. Saman Tejada

## 2020-05-22 NOTE — ED NOTES
Patient stood at bedside to use urinal.  Tolerated well. Nausea resolved. Pain continues and new order received to repeat dilaudid. Plan is for admission and patient spoke to his wife and updated her. Call light in reach. Will monitor.       Andria Anderson RN  05/22/20 [id number]

## 2020-05-22 NOTE — H&P
Procedure Laterality Date    CHOLECYSTECTOMY      COLONOSCOPY      ENDOSCOPIC ULTRASOUND (LOWER)      ENDOSCOPY, COLON, DIAGNOSTIC      IVC FILTER INSERTION      NASAL SEPTUM SURGERY       FHX:       Problem Relation Age of Onset    Diabetes Mother     Colon Cancer Father     Heart Disease Father     Heart Attack Father      SOCHX:   Social History     Socioeconomic History    Marital status:      Spouse name: None    Number of children: None    Years of education: None    Highest education level: None   Occupational History    None   Social Needs    Financial resource strain: None    Food insecurity     Worry: None     Inability: None    Transportation needs     Medical: None     Non-medical: None   Tobacco Use    Smoking status: Former Smoker     Types: Cigarettes     Last attempt to quit: 2007     Years since quittin.7    Smokeless tobacco: Never Used   Substance and Sexual Activity    Alcohol use: Not Currently     Comment: Rare    Drug use: No    Sexual activity: Yes     Partners: Female   Lifestyle    Physical activity     Days per week: None     Minutes per session: None    Stress: None   Relationships    Social connections     Talks on phone: None     Gets together: None     Attends Bahai service: None     Active member of club or organization: None     Attends meetings of clubs or organizations: None     Relationship status: None    Intimate partner violence     Fear of current or ex partner: None     Emotionally abused: None     Physically abused: None     Forced sexual activity: None   Other Topics Concern    None   Social History Narrative    None      Allergies: Morphine and Neurontin [gabapentin]  Medications:          PHYSICAL EXAM:    BP (!) 155/100   Pulse 57   Temp 98 °F (36.7 °C) (Oral)   Resp 16   Ht 6' 2\" (1.88 m)   Wt 270 lb (122.5 kg)   SpO2 97%   BMI 34.67 kg/m²     General appearance:  No apparent distress, appears stated age and

## 2020-05-23 NOTE — PROGRESS NOTES
Discharge teaching and instructions for diagnosis/procedure of thunderclap headache completed with patient using teachback method. AVS reviewed. Printed prescriptions given to patient. Patient voiced understanding regarding prescriptions, follow up appointments, and care of self at home. Educated patient to call to make 1 week follow up appointments with his PCP, who he states also follow his coumadin. Notified him to take home coumadin as he has per Dr. Hafsa Romeo and notify PCP who follow his coumadin of new medication. Also notified him to make 1 week follow up with Dr. Ayana Dejesus. No questions or concerns voiced.

## 2020-05-23 NOTE — PROGRESS NOTES
NEUROLOGY INPATIENT PROGRESS NOTE    [facility]    MRN -  [id number]   Acct # - [de-identified]      - 1965    60 y.o. Subjective: The patient is seen as follow up for intractable headache. He is doing much better today. He reports benefit to Depakote  mg daily started. Patient is alert at this time. Objective:   BP (!) 153/92   Pulse 60   Temp 97.9 °F (36.6 °C) (Oral)   Resp 18   Ht 6' 2\" (1.88 m)   Wt 270 lb (122.5 kg)   SpO2 95%   BMI 34.67 kg/m²       Intake/Output Summary (Last 24 hours) at 2020 1119  Last data filed at 2020 0420  Gross per 24 hour   Intake 902 ml   Output 400 ml   Net 502 ml       Physical Exam:  General:  Awake, up in chair,  not in distress. Language is intact. HEENT: pink conjunctiva, unicteric sclera, moist oral mucosa. There is no neck lymphadenopathy. Neck: There is no carotid bruits. The Neck is supple. Neuro: CN 2-12 grossly intact with no focal deficits. Power 5/5 Throughout symmetric, Reflexes are decreased symmetric. Long tracts are intact. Cerebellar exam is Intact. Sensory exam is intact to light touch. Gait is intact. No abnormal movement. Osteo: There is no LROM of any of the 4 extremity joints, no joint tenderness. Leg edema +2  Skin: no lesions, no rash, warm and moist to touch. Abdomen is soft intact bowel sounds. ROS:    Cardiac: no chest pain. No palpitations.   Renal : no flank pain, no hematuria  Skin: no rash    Medications:     losartan  25 mg Oral Daily    hydrALAZINE  25 mg Oral 3 times per day    sodium chloride flush  10 mL Intravenous 2 times per day    polyethylene glycol  17 g Oral Daily    pantoprazole  40 mg Oral BID AC    divalproex  500 mg Oral BID       Data:   CBC:   Recent Labs     20  0946 20  0337   WBC 4.3* 4.6*   HGB 15.2 14.7    179     BMP:    Recent Labs     20  0946 20  0337    136   K 4.7 4.5    103   CO2 26 major intracranial vascular flow voids are present. The midline craniocervical junction structures are normal.  The pituitary gland and brainstem are normal.            Impression    1. No evidence of an acute infarct. 2. Small old infarcts in the left cerebral hemisphere. These are in the left frontal lobe, left occipital lobe and head of the caudate nucleus on the left. **This report has been created using voice recognition software. It may contain minor errors which are inherent in voice recognition technology. **    Final report electronically signed by Dr. Sam Matos on 5/22/2020 2:35 PM     Reviewed   Results for orders placed during the hospital encounter of 05/22/20   CT HEAD WO CONTRAST    Narrative PROCEDURE: NONCONTRAST CT BRAIN    CLINICAL INFORMATION: thunderclap headache    COMPARISON: No prior study    TECHNIQUE: Multiple axial 5 mm images of the brain were obtained without administration of intravenous contrast material. ALL CT SCANS AT THIS FACILITY use dose modulation, iterative reconstruction, and/or weight-based dosing when appropriate to reduce   radiation dose to as low as reasonably achievable. FINDINGS:    Lateral, third, fourth ventricles: Normal in size, contour, position. .. Parenchyma:  No acute infarction, mass lesion, or intracranial hemorrhage is seen. There is a wedge-shaped focus of diminished attenuation in the left parietal lobe anteriorly with some mild associated encephalomalacia, consistent with old infarct. Is   also a second focus of old infarction in the left basal ganglia region anteriorly. Mastoid processes: Well aerated. Normal in appearance. .       Paranasal sinuses/calvarium: Unremarkable        Impression No acute intracranial process. **This report has been created using voice recognition software. It may contain minor errors which are inherent in voice recognition technology. **    Final report electronically signed by Dr. Dominguez Antonio

## 2020-05-23 NOTE — DISCHARGE SUMMARY
Brain MRI and CTA of the head and neck were ordered, both came back no acute findings. Per neurology note, lumbar puncture will defer at this time as patient's neck is supple and his exam is nonfocal.  Neurology is okay to continue Coumadin. On 5/23/2020, patient denies headache, focal weakness or numbness, dizziness, shortness of breath, changes in speech, blurry vision. Of note, patient admits that he's stress lately due to his work. Patient reports constipation, but this is been chronic per patient. Last bowel movement was on 5/22/2020. He admits that he does not eat high-fiber diet. Case discussed with Dr. Latha Singh (neurology) over the phone, who thinks the HA is due to migraine and stress. HA improved after depakote. Dr. Carmen Molina is okay for discharge and recommend to continue Depakote on discharge. Patient agreed with discharge today and plan. Patient was discharged home in stable condition on 5/23/2020. Patient was advised to follow-up with his PCP and Dr. Carmen Molina in 1 week. He was advised to discuss with his PCP regarding his constipation. For his hypertension, his blood pressure is fairly controlled, amlodipine was stopped due to chronic lymphedema. He received hydralazine in the hospital.  Losartan dose was increased. For his chronic anticoagulation, INR 2.98 on 5/23/2020. He was advised to continue home dose Coumadin and follow-up with PCP (patient states that his PCP manages his Coumadin). Patient verbalized understanding of instructions and agreed with plan.          Exam:     Vitals:  Vitals:    05/23/20 0356 05/23/20 0750 05/23/20 0822 05/23/20 1134   BP: (!) 162/92  (!) 153/92 (!) 176/100   Pulse: 53  60 61   Resp: 18  18 18   Temp: 97.5 °F (36.4 °C)  97.9 °F (36.6 °C) 97.7 °F (36.5 °C)   TempSrc: Oral  Oral Oral   SpO2: 95% 94% 95% 96%   Weight:       Height:         Weight: Weight: 270 lb (122.5 kg)     24 hour intake/output:    Intake/Output Summary (Last 24 hours) at 5/23/2020 [address] filed at 5/23/2020 1415  Gross per 24 hour   Intake 2067.5 ml   Output --   Net 2067.5 ml         General appearance:  Alert, not in acute distress   HEENT:  Normal cephalic, atraumatic without obvious deformity. Pupils equal, round, and reactive to light. Conjunctivae clear. Clear oral mucosa. Neck: Supple, with full range of motion. No jugular venous distention. Trachea midline. Respiratory:  Normal respiratory effort. Clear to auscultation, bilaterally without Rales/Wheezes/Rhonchi. Cardiovascular:  Normal rate, regular rhythm with normal S1/S2 without murmurs, rubs or gallops. Abdomen: Soft, non-tender, non-distended with normal bowel sounds. Musculoskeletal:  No clubbing, cyanosis or edema bilaterally. Full range of motion without deformity. Skin: Skin color, texture, turgor normal.  No rashes or lesions. Neurological exam: alert, oriented x3, normal speech, no focal findings or movement disorder noted. Decreased sensation on both legs ( chronic ) per patient. Exam of extremities:(+) brownish discoloration on left leg,  peripheral pulses normal, no pedal edema, trace pitting edema on both distal legs, no clubbing or cyanosis      Labs: For convenience and continuity at follow-up the following most recent labs are provided:      CBC:    Lab Results   Component Value Date    WBC 4.6 05/23/2020    HGB 14.7 05/23/2020    HCT 45.4 05/23/2020     05/23/2020       Renal:    Lab Results   Component Value Date     05/23/2020    K 4.5 05/23/2020     05/23/2020    CO2 28 05/23/2020    BUN 13 05/23/2020    CREATININE 1.0 05/23/2020    CALCIUM 9.0 05/23/2020         Significant Diagnostic Studies    Radiology:   CTA HEAD W WO CONTRAST   Final Result       1. No evidence of stenosis of either carotid bulb or internal carotid artery. 2. No intracranial stenoses or occlusions. No aneurysms. **This report has been created using voice recognition software.  It may

## 2020-05-23 NOTE — PLAN OF CARE
Problem: Falls - Risk of:  Goal: Will remain free from falls  Description: Will remain free from falls  Outcome: Met This Shift  Note: Patient remained free from falls this shift. Used call light appropriately. Wore nonskid socks when ambulating with assistance. Bed alarm on. Call light in reach. Problem: Falls - Risk of:  Goal: Absence of physical injury  Description: Absence of physical injury  Outcome: Met This Shift  Note: Patient remained free from physical injury this shift. Used call light appropriately. Wore nonskid socks when ambulating with assistance. Bed alarm on. Call light in reach. Pathway clear. Problem: Pain:  Goal: Control of acute pain  Description: Control of acute pain  Outcome: Ongoing  Note: Patient pain goal: no pain. Patient with pain at start of shift. Headache/migraine posterior aching/throbbing. Medication given per [facility]. New medication started - Depakote - patient handout and education provided on new medication, patient verbalized understanding of new med education. Non-pharmacological pain mgmt- rest, reposition, distraction, elevation, emotional support, environmental changes (dim lights, close door for quiet) provided. Will continue to monitor pain. Problem: Neurological  Goal: Maximum potential motor/sensory/cognitive function  Outcome: Ongoing  Note: Patient alert and oriented x4. Denies any numbness or tingling. Gait steady. Some photosensitivity present with headache. Problem: Cardiovascular  Goal: Hemodynamic stability  Outcome: Ongoing  Note: VSS. Continuous telemetry: NSR. BP slightly elevated this shift - medication for BP given per MAR. Problem: Discharge Planning:  Goal: Discharged to appropriate level of care  Description: Discharged to appropriate level of care  Outcome: Ongoing  Note: Patient from home, plans to return home with wife at discharge. Possible discharge in a.m. will continue to monitor for discharge needs.        Patient participated in plan of care and contributed to goal setting.

## 2020-06-04 NOTE — PATIENT INSTRUCTIONS
1. Continue with Depakote 500 mg twice a day  2. CBC, Hepatic function panel  3. You need to wear your CPAP as discussed. 4. Follow up with sleep medicine, as discussed. 5. Keep headache diary  6. Follow up in 4 months or sooner if needed. 7. Call if any questions or concerns.

## 2020-06-04 NOTE — PROGRESS NOTES
2020    TELEHEALTH EVALUATION -- Audio/Visual (During [id number] public health emergency)    HPI:    Stevenson Doss (:  1965) has requested an audio/video evaluation for the following concern(s): hospital follow up for headache. He is doing well. He feels his headaches are well controlled. He is on Depakote 500 mg two times a day. He does have sleep apnea, but he does not wear CPAP. He is being evaluated virtually to discuss the plan of care going forward. Review of Systems   Eyes: Negative. Respiratory: Negative. Cardiovascular: Negative. Genitourinary: Negative. Neurological: Negative. Prior to Visit Medications    Medication Sig Taking?  Authorizing Provider   divalproex (DEPAKOTE ER) 500 MG extended release tablet Take 1 tablet by mouth 2 times daily  Dmitry Marvin MD   losartan (COZAAR) 50 MG tablet Take 1 tablet by mouth daily  Dmitry Marvin MD   polyethylene glycol (GLYCOLAX) 17 g packet Take 17 g by mouth daily  Dmitry Marvin MD   warfarin (COUMADIN) 4 MG tablet Take 4 mg by mouth daily  Historical Provider, MD   Cholecalciferol (VITAMIN D) 2000 units CAPS capsule Take 2 capsules by mouth daily  Historical Provider, MD       Social History     Tobacco Use    Smoking status: Former Smoker     Types: Cigarettes     Last attempt to quit: 2007     Years since quittin.8    Smokeless tobacco: Never Used   Substance Use Topics    Alcohol use: Not Currently     Comment: Rare    Drug use: No        Past Medical History:   Diagnosis Date    Cerebral artery occlusion with cerebral infarction ([address]     Deep vein blood clot of left lower extremity ([address]     Headache     Hyperlipidemia     Hypertension     Kidney stone 3/4/2019    Lymphedema of both lower extremities     wears lymphedema boots    Sciatica of left side    ,   Past Surgical History:   Procedure Laterality Date    CHOLECYSTECTOMY      COLONOSCOPY      ENDOSCOPIC ULTRASOUND (LOWER)     

## 2020-11-03 PROBLEM — I10 HTN (HYPERTENSION): Status: RESOLVED | Noted: 2018-07-26 | Resolved: 2020-01-01

## 2020-11-16 NOTE — ED PROVIDER NOTES
MEDICATIONS       Discharge Medication List as of 11/16/2020 10:45 AM      CONTINUE these medications which have NOT CHANGED    Details   METOPROLOL TARTRATE PO Take by mouthHistorical Med      losartan (COZAAR) 50 MG tablet Take 1 tablet by mouth daily, Disp-30 tablet, R-0Normal      warfarin (COUMADIN) 4 MG tablet Take 4 mg by mouth dailyHistorical Med      Cholecalciferol (VITAMIN D) 2000 units CAPS capsule Take 2 capsules by mouth dailyHistorical Med             ALLERGIES     Patient is is allergic to morphine and neurontin [gabapentin]. Patients   There is no immunization history on file for this patient. FAMILY HISTORY     Patient's family history includes Colon Cancer in his father; Diabetes in his mother; Heart Attack in his father; Heart Disease in his father. SOCIAL HISTORY     Patient  reports that he quit smoking about 13 years ago. His smoking use included cigarettes. He has never used smokeless tobacco. He reports previous alcohol use. He reports that he does not use drugs. PHYSICAL EXAM     ED TRIAGE VITALS  BP: (!) 148/94, Temp: 98.4 °F (36.9 °C), Pulse: 66, Resp: 16, SpO2: 96 %,Estimated body mass index is 34.67 kg/m² as calculated from the following:    Height as of this encounter: 6' 2\" (1.88 m). Weight as of this encounter: 270 lb (122.5 kg). ,No LMP for male patient. Physical Exam  Constitutional:       General: He is not in acute distress. Appearance: Normal appearance. He is not ill-appearing, toxic-appearing or diaphoretic. HENT:      Nose: Congestion and rhinorrhea present. Mouth/Throat:      Lips: Pink. Mouth: Mucous membranes are moist.      Pharynx: Oropharynx is clear. Cardiovascular:      Rate and Rhythm: Normal rate. Pulses: Normal pulses. Heart sounds: Normal heart sounds. No murmur. No friction rub. No gallop. Pulmonary:      Effort: Pulmonary effort is normal. No respiratory distress. Breath sounds: Normal breath sounds.  No stridor. No wheezing, rhonchi or rales. Chest:      Chest wall: No tenderness. Musculoskeletal: Normal range of motion. Skin:     General: Skin is warm. Capillary Refill: Capillary refill takes less than 2 seconds. Neurological:      General: No focal deficit present. Mental Status: He is alert and oriented to person, place, and time. Sensory: No sensory deficit. Psychiatric:         Mood and Affect: Mood normal.         Behavior: Behavior normal.         Thought Content: Thought content normal.         Judgment: Judgment normal.         DIAGNOSTIC RESULTS     Labs:No results found for this visit on 11/16/20. IMAGING:    No orders to display     URGENT CARE COURSE:     Vitals:    11/16/20 1036   BP: (!) 148/94   Pulse: 66   Resp: 16   Temp: 98.4 °F (36.9 °C)   SpO2: 96%   Weight: 270 lb (122.5 kg)   Height: 6' 2\" (1.88 m)       Medications - No data to display         PROCEDURES:  None    FINAL IMPRESSION      1. COVID-19 ruled out by laboratory testing    2. Cough    3. Nasal congestion          DISPOSITION/ PLAN   Patient is discharged home with instructions to self quarantine while there are pending COVID-19 test which can take 3 to 5 days to be resulted. Most upper respiratory infections are viral in nature and should improve on their own within 1 week. Over-the-counter Tylenol as well as adequate fluid hydration and decongestants are appropriate for management of symptoms and home setting.   Should follow-up with primary care provider and health department if there is a positive Covid test.        PATIENT REFERRED TO:  Eric Dowell,   [address] [place] [address] / [facility] [id number]      DISCHARGE MEDICATIONS:  Discharge Medication List as of 11/16/2020 10:45 AM          Discharge Medication List as of 11/16/2020 10:45 AM      STOP taking these medications       divalproex (DEPAKOTE ER) 500 MG extended release tablet Comments:   Reason for Stopping:               Discharge Medication List as of 11/16/2020 10:45 AM          JASMYNE Chavira NP    (Please note that portions of this note were completed with a voice recognition program. Efforts were made to edit the dictations but occasionally words are mis-transcribed.)         JASMYNE Buckley NP  11/17/20 2779

## 2020-11-16 NOTE — ED NOTES
Patient understood instructions verbally,  Follow up with PCP with any concerns, or worse cough,elevated fevers,  follow up with ED. Work excuse with patient. ambulated self to lobby,stable condition. covid swab obtained,and discharged per KELECHI Gustafson CNP.      Maricel Ibrahim LPN  23/70/69 0962

## 2020-11-17 NOTE — CARE COORDINATION
Left voicemail message to return call to [phone] for ED/ Covid outreach. This is the 2nd attempt to contact, no further attempts will be made.

## 2021-01-01 ENCOUNTER — APPOINTMENT (OUTPATIENT)
Dept: CT IMAGING | Age: 56
DRG: 023 | End: 2021-01-01
Payer: COMMERCIAL

## 2021-01-01 ENCOUNTER — APPOINTMENT (OUTPATIENT)
Dept: GENERAL RADIOLOGY | Age: 56
DRG: 023 | End: 2021-01-01
Payer: COMMERCIAL

## 2021-01-01 ENCOUNTER — APPOINTMENT (OUTPATIENT)
Dept: CARDIAC CATH/INVASIVE PROCEDURES | Age: 56
DRG: 023 | End: 2021-01-01
Payer: COMMERCIAL

## 2021-01-01 ENCOUNTER — APPOINTMENT (OUTPATIENT)
Dept: NON INVASIVE DIAGNOSTICS | Age: 56
DRG: 023 | End: 2021-01-01
Payer: COMMERCIAL

## 2021-01-01 ENCOUNTER — APPOINTMENT (OUTPATIENT)
Dept: INTERVENTIONAL RADIOLOGY/VASCULAR | Age: 56
DRG: 023 | End: 2021-01-01
Payer: COMMERCIAL

## 2021-01-01 ENCOUNTER — APPOINTMENT (OUTPATIENT)
Dept: MRI IMAGING | Age: 56
DRG: 023 | End: 2021-01-01
Payer: COMMERCIAL

## 2021-01-01 ENCOUNTER — ANESTHESIA EVENT (OUTPATIENT)
Dept: OPERATING ROOM | Age: 56
DRG: 023 | End: 2021-01-01
Payer: COMMERCIAL

## 2021-01-01 ENCOUNTER — ANESTHESIA (OUTPATIENT)
Dept: OPERATING ROOM | Age: 56
DRG: 023 | End: 2021-01-01
Payer: COMMERCIAL

## 2021-01-01 ENCOUNTER — HOSPITAL ENCOUNTER (INPATIENT)
Age: 56
LOS: 9 days | DRG: 023 | End: 2021-01-16
Attending: EMERGENCY MEDICINE | Admitting: INTERNAL MEDICINE
Payer: COMMERCIAL

## 2021-01-01 VITALS
TEMPERATURE: 99.9 F | DIASTOLIC BLOOD PRESSURE: 89 MMHG | RESPIRATION RATE: 12 BRPM | HEIGHT: 73 IN | OXYGEN SATURATION: 93 % | HEART RATE: 100 BPM | BODY MASS INDEX: 37.43 KG/M2 | WEIGHT: 282.41 LBS | SYSTOLIC BLOOD PRESSURE: 117 MMHG

## 2021-01-01 VITALS
SYSTOLIC BLOOD PRESSURE: 89 MMHG | OXYGEN SATURATION: 91 % | DIASTOLIC BLOOD PRESSURE: 53 MMHG | RESPIRATION RATE: 12 BRPM

## 2021-01-01 DIAGNOSIS — Z91.89 AT HIGH RISK FOR DEEP VENOUS THROMBOSIS: ICD-10-CM

## 2021-01-01 DIAGNOSIS — R07.9 CHEST PAIN, MODERATE CORONARY ARTERY RISK: Primary | ICD-10-CM

## 2021-01-01 LAB
ABO: NORMAL
ACINETOBACTER CALCOACETICUS-BAUMANNII BY PCR: NOT DETECTED
ACT TEG: 128 SECONDS (ref 86–118)
ADENOVIRUS BY PCR: NOT DETECTED
ALBUMIN SERPL-MCNC: 2.9 G/DL (ref 3.5–5.1)
ALBUMIN SERPL-MCNC: 2.9 G/DL (ref 3.5–5.1)
ALBUMIN SERPL-MCNC: 3.8 G/DL (ref 3.5–5.1)
ALBUMIN SERPL-MCNC: 4.3 G/DL (ref 3.5–5.1)
ALLEN TEST: ABNORMAL
ALLEN TEST: ABNORMAL
ALP BLD-CCNC: 26 U/L (ref 38–126)
ALP BLD-CCNC: 29 U/L (ref 38–126)
ALP BLD-CCNC: 29 U/L (ref 38–126)
ALP BLD-CCNC: 30 U/L (ref 38–126)
ALT SERPL-CCNC: 14 U/L (ref 11–66)
ALT SERPL-CCNC: 15 U/L (ref 11–66)
ALT SERPL-CCNC: 70 U/L (ref 11–66)
ALT SERPL-CCNC: 87 U/L (ref 11–66)
AMPHETAMINE+METHAMPHETAMINE URINE SCREEN: NEGATIVE
ANGLE, RAPID TEG: 70.1 DEG (ref 64–80)
ANION GAP SERPL CALCULATED.3IONS-SCNC: 10 MEQ/L (ref 8–16)
ANION GAP SERPL CALCULATED.3IONS-SCNC: 11 MEQ/L (ref 8–16)
ANION GAP SERPL CALCULATED.3IONS-SCNC: 6 MEQ/L (ref 8–16)
ANION GAP SERPL CALCULATED.3IONS-SCNC: 7 MEQ/L (ref 8–16)
ANION GAP SERPL CALCULATED.3IONS-SCNC: 8 MEQ/L (ref 8–16)
ANION GAP SERPL CALCULATED.3IONS-SCNC: 8 MEQ/L (ref 8–16)
ANION GAP SERPL CALCULATED.3IONS-SCNC: 9 MEQ/L (ref 8–16)
ANTIBODY SCREEN: NORMAL
APTT: 24.6 SECONDS (ref 22–38)
APTT: 42.3 SECONDS (ref 22–38)
AST SERPL-CCNC: 40 U/L (ref 5–40)
AST SERPL-CCNC: 51 U/L (ref 5–40)
AST SERPL-CCNC: 51 U/L (ref 5–40)
AST SERPL-CCNC: 53 U/L (ref 5–40)
AVERAGE GLUCOSE: 105 MG/DL (ref 70–126)
BACTERIA: ABNORMAL
BARBITURATE QUANTITATIVE URINE: NEGATIVE
BASE EXCESS (CALCULATED): 2.3 MMOL/L (ref -2.5–2.5)
BASE EXCESS (CALCULATED): 2.9 MMOL/L (ref -2.5–2.5)
BASE EXCESS (CALCULATED): 4.3 MMOL/L (ref -2.5–2.5)
BASOPHILS # BLD: 0.2 %
BASOPHILS # BLD: 0.4 %
BASOPHILS # BLD: 0.7 %
BASOPHILS # BLD: 0.8 %
BASOPHILS ABSOLUTE: 0 THOU/MM3 (ref 0–0.1)
BASOPHILS ABSOLUTE: 0.1 THOU/MM3 (ref 0–0.1)
BENZODIAZEPINE QUANTITATIVE URINE: NEGATIVE
BILIRUB SERPL-MCNC: 0.2 MG/DL (ref 0.3–1.2)
BILIRUB SERPL-MCNC: 0.3 MG/DL (ref 0.3–1.2)
BILIRUB SERPL-MCNC: 0.4 MG/DL (ref 0.3–1.2)
BILIRUB SERPL-MCNC: 0.5 MG/DL (ref 0.3–1.2)
BILIRUBIN DIRECT: < 0.2 MG/DL (ref 0–0.3)
BILIRUBIN DIRECT: < 0.2 MG/DL (ref 0–0.3)
BILIRUBIN URINE: NEGATIVE
BLOOD, URINE: ABNORMAL
BLOOD, URINE: NEGATIVE
BLOOD, URINE: NEGATIVE
BUN BLDV-MCNC: 12 MG/DL (ref 7–22)
BUN BLDV-MCNC: 12 MG/DL (ref 7–22)
BUN BLDV-MCNC: 14 MG/DL (ref 7–22)
BUN BLDV-MCNC: 15 MG/DL (ref 7–22)
BUN BLDV-MCNC: 16 MG/DL (ref 7–22)
BUN BLDV-MCNC: 17 MG/DL (ref 7–22)
BUN BLDV-MCNC: 20 MG/DL (ref 7–22)
CALCIUM IONIZED: 0.99 MMOL/L (ref 1.12–1.32)
CALCIUM IONIZED: 1.09 MMOL/L (ref 1.12–1.32)
CALCIUM IONIZED: 1.19 MMOL/L (ref 1.12–1.32)
CALCIUM SERPL-MCNC: 10.1 MG/DL (ref 8.5–10.5)
CALCIUM SERPL-MCNC: 7.7 MG/DL (ref 8.5–10.5)
CALCIUM SERPL-MCNC: 8.1 MG/DL (ref 8.5–10.5)
CALCIUM SERPL-MCNC: 8.5 MG/DL (ref 8.5–10.5)
CALCIUM SERPL-MCNC: 8.6 MG/DL (ref 8.5–10.5)
CALCIUM SERPL-MCNC: 8.7 MG/DL (ref 8.5–10.5)
CALCIUM SERPL-MCNC: 8.8 MG/DL (ref 8.5–10.5)
CALCIUM SERPL-MCNC: 8.9 MG/DL (ref 8.5–10.5)
CANNABINOID QUANTITATIVE URINE: NEGATIVE
CASTS: ABNORMAL /LPF
CHARACTER, URINE: CLEAR
CHLAMYDIA PNEUMONIAE BY PCR: NOT DETECTED
CHLORIDE BLD-SCNC: 104 MEQ/L (ref 98–111)
CHLORIDE BLD-SCNC: 104 MEQ/L (ref 98–111)
CHLORIDE BLD-SCNC: 107 MEQ/L (ref 98–111)
CHLORIDE BLD-SCNC: 107 MEQ/L (ref 98–111)
CHLORIDE BLD-SCNC: 109 MEQ/L (ref 98–111)
CHLORIDE BLD-SCNC: 110 MEQ/L (ref 98–111)
CHLORIDE BLD-SCNC: 112 MEQ/L (ref 98–111)
CHLORIDE BLD-SCNC: 112 MEQ/L (ref 98–111)
CHLORIDE BLD-SCNC: 113 MEQ/L (ref 98–111)
CHLORIDE BLD-SCNC: 113 MEQ/L (ref 98–111)
CHLORIDE BLD-SCNC: 115 MEQ/L (ref 98–111)
CHLORIDE BLD-SCNC: 116 MEQ/L (ref 98–111)
CHOLESTEROL, TOTAL: 188 MG/DL (ref 100–199)
CO2: 22 MEQ/L (ref 23–33)
CO2: 22 MEQ/L (ref 23–33)
CO2: 23 MEQ/L (ref 23–33)
CO2: 26 MEQ/L (ref 23–33)
CO2: 27 MEQ/L (ref 23–33)
CO2: 27 MEQ/L (ref 23–33)
CO2: 28 MEQ/L (ref 23–33)
COCAINE METABOLITE QUANTITATIVE URINE: NEGATIVE
COLLECTED BY:: ABNORMAL
COLOR: YELLOW
CORONAVIRUS PCR: NOT DETECTED
CREAT SERPL-MCNC: 0.8 MG/DL (ref 0.4–1.2)
CREAT SERPL-MCNC: 0.9 MG/DL (ref 0.4–1.2)
CREAT SERPL-MCNC: 1 MG/DL (ref 0.4–1.2)
CREAT SERPL-MCNC: 1.1 MG/DL (ref 0.4–1.2)
CREAT SERPL-MCNC: 1.1 MG/DL (ref 0.4–1.2)
CREAT SERPL-MCNC: 1.3 MG/DL (ref 0.4–1.2)
CRYSTALS: ABNORMAL
DEVICE: ABNORMAL
EKG ATRIAL RATE: 55 BPM
EKG ATRIAL RATE: 58 BPM
EKG ATRIAL RATE: 73 BPM
EKG ATRIAL RATE: 80 BPM
EKG ATRIAL RATE: 98 BPM
EKG P AXIS: -2 DEGREES
EKG P AXIS: 46 DEGREES
EKG P AXIS: 50 DEGREES
EKG P AXIS: 51 DEGREES
EKG P AXIS: 52 DEGREES
EKG P-R INTERVAL: 146 MS
EKG P-R INTERVAL: 148 MS
EKG P-R INTERVAL: 152 MS
EKG P-R INTERVAL: 170 MS
EKG P-R INTERVAL: 176 MS
EKG Q-T INTERVAL: 366 MS
EKG Q-T INTERVAL: 366 MS
EKG Q-T INTERVAL: 442 MS
EKG Q-T INTERVAL: 444 MS
EKG Q-T INTERVAL: 488 MS
EKG QRS DURATION: 92 MS
EKG QRS DURATION: 94 MS
EKG QRS DURATION: 98 MS
EKG QTC CALCULATION (BAZETT): 403 MS
EKG QTC CALCULATION (BAZETT): 424 MS
EKG QTC CALCULATION (BAZETT): 433 MS
EKG QTC CALCULATION (BAZETT): 467 MS
EKG QTC CALCULATION (BAZETT): 562 MS
EKG R AXIS: -3 DEGREES
EKG R AXIS: -6 DEGREES
EKG R AXIS: 41 DEGREES
EKG R AXIS: 5 DEGREES
EKG R AXIS: 58 DEGREES
EKG T AXIS: 107 DEGREES
EKG T AXIS: 137 DEGREES
EKG T AXIS: 23 DEGREES
EKG T AXIS: 70 DEGREES
EKG T AXIS: 86 DEGREES
EKG VENTRICULAR RATE: 55 BPM
EKG VENTRICULAR RATE: 58 BPM
EKG VENTRICULAR RATE: 73 BPM
EKG VENTRICULAR RATE: 80 BPM
EKG VENTRICULAR RATE: 98 BPM
ENTEROBACTER CLOACAE COMPLEX BY PCR: NOT DETECTED
EOSINOPHIL # BLD: 0.1 %
EOSINOPHIL # BLD: 0.5 %
EOSINOPHIL # BLD: 2.4 %
EOSINOPHIL # BLD: 2.5 %
EOSINOPHILS ABSOLUTE: 0 THOU/MM3 (ref 0–0.4)
EOSINOPHILS ABSOLUTE: 0.1 THOU/MM3 (ref 0–0.4)
EOSINOPHILS ABSOLUTE: 0.1 THOU/MM3 (ref 0–0.4)
EOSINOPHILS ABSOLUTE: 0.2 THOU/MM3 (ref 0–0.4)
EPITHELIAL CELLS, UA: ABNORMAL /HPF
EPL-TEG: 0 % (ref 0–15)
ERYTHROCYTE [DISTWIDTH] IN BLOOD BY AUTOMATED COUNT: 12.9 % (ref 11.5–14.5)
ERYTHROCYTE [DISTWIDTH] IN BLOOD BY AUTOMATED COUNT: 13 % (ref 11.5–14.5)
ERYTHROCYTE [DISTWIDTH] IN BLOOD BY AUTOMATED COUNT: 13 % (ref 11.5–14.5)
ERYTHROCYTE [DISTWIDTH] IN BLOOD BY AUTOMATED COUNT: 13.2 % (ref 11.5–14.5)
ERYTHROCYTE [DISTWIDTH] IN BLOOD BY AUTOMATED COUNT: 13.4 % (ref 11.5–14.5)
ERYTHROCYTE [DISTWIDTH] IN BLOOD BY AUTOMATED COUNT: 13.5 % (ref 11.5–14.5)
ERYTHROCYTE [DISTWIDTH] IN BLOOD BY AUTOMATED COUNT: 13.8 % (ref 11.5–14.5)
ERYTHROCYTE [DISTWIDTH] IN BLOOD BY AUTOMATED COUNT: 44.2 FL (ref 35–45)
ERYTHROCYTE [DISTWIDTH] IN BLOOD BY AUTOMATED COUNT: 44.5 FL (ref 35–45)
ERYTHROCYTE [DISTWIDTH] IN BLOOD BY AUTOMATED COUNT: 44.6 FL (ref 35–45)
ERYTHROCYTE [DISTWIDTH] IN BLOOD BY AUTOMATED COUNT: 44.7 FL (ref 35–45)
ERYTHROCYTE [DISTWIDTH] IN BLOOD BY AUTOMATED COUNT: 46.1 FL (ref 35–45)
ERYTHROCYTE [DISTWIDTH] IN BLOOD BY AUTOMATED COUNT: 47.3 FL (ref 35–45)
ERYTHROCYTE [DISTWIDTH] IN BLOOD BY AUTOMATED COUNT: 48.4 FL (ref 35–45)
ERYTHROCYTE [DISTWIDTH] IN BLOOD BY AUTOMATED COUNT: 48.4 FL (ref 35–45)
ERYTHROCYTE [DISTWIDTH] IN BLOOD BY AUTOMATED COUNT: 50 FL (ref 35–45)
ERYTHROCYTE [DISTWIDTH] IN BLOOD BY AUTOMATED COUNT: 50 FL (ref 35–45)
ERYTHROCYTE [DISTWIDTH] IN BLOOD BY AUTOMATED COUNT: 50.1 FL (ref 35–45)
ERYTHROCYTE [DISTWIDTH] IN BLOOD BY AUTOMATED COUNT: 51.4 FL (ref 35–45)
ESCHERICHIA COLI BY PCR: NOT DETECTED
ETHYL ALCOHOL, SERUM: < 0.01 %
FOLATE: 18.3 NG/ML (ref 4.8–24.2)
GAMMA GLUTAMYL TRANSFERASE: 68 U/L (ref 8–69)
GAMMA GLUTAMYL TRANSFERASE: 68 U/L (ref 8–69)
GFR SERPL CREATININE-BSD FRML MDRD: 57 ML/MIN/1.73M2
GFR SERPL CREATININE-BSD FRML MDRD: 69 ML/MIN/1.73M2
GFR SERPL CREATININE-BSD FRML MDRD: 69 ML/MIN/1.73M2
GFR SERPL CREATININE-BSD FRML MDRD: 77 ML/MIN/1.73M2
GFR SERPL CREATININE-BSD FRML MDRD: 87 ML/MIN/1.73M2
GFR SERPL CREATININE-BSD FRML MDRD: > 90 ML/MIN/1.73M2
GLUCOSE BLD-MCNC: 102 MG/DL (ref 70–108)
GLUCOSE BLD-MCNC: 109 MG/DL (ref 70–108)
GLUCOSE BLD-MCNC: 111 MG/DL (ref 70–108)
GLUCOSE BLD-MCNC: 112 MG/DL (ref 70–108)
GLUCOSE BLD-MCNC: 112 MG/DL (ref 70–108)
GLUCOSE BLD-MCNC: 114 MG/DL (ref 70–108)
GLUCOSE BLD-MCNC: 117 MG/DL (ref 70–108)
GLUCOSE BLD-MCNC: 120 MG/DL (ref 70–108)
GLUCOSE BLD-MCNC: 120 MG/DL (ref 70–108)
GLUCOSE BLD-MCNC: 122 MG/DL (ref 70–108)
GLUCOSE BLD-MCNC: 126 MG/DL (ref 70–108)
GLUCOSE BLD-MCNC: 128 MG/DL (ref 70–108)
GLUCOSE BLD-MCNC: 132 MG/DL (ref 70–108)
GLUCOSE BLD-MCNC: 145 MG/DL (ref 70–108)
GLUCOSE BLD-MCNC: 145 MG/DL (ref 70–108)
GLUCOSE BLD-MCNC: 146 MG/DL (ref 70–108)
GLUCOSE BLD-MCNC: 148 MG/DL (ref 70–108)
GLUCOSE BLD-MCNC: 149 MG/DL (ref 70–108)
GLUCOSE BLD-MCNC: 149 MG/DL (ref 70–108)
GLUCOSE BLD-MCNC: 151 MG/DL (ref 70–108)
GLUCOSE BLD-MCNC: 161 MG/DL (ref 70–108)
GLUCOSE BLD-MCNC: 170 MG/DL (ref 70–108)
GLUCOSE BLD-MCNC: 172 MG/DL (ref 70–108)
GLUCOSE BLD-MCNC: 175 MG/DL (ref 70–108)
GLUCOSE BLD-MCNC: 186 MG/DL (ref 70–108)
GLUCOSE BLD-MCNC: 193 MG/DL (ref 70–108)
GLUCOSE BLD-MCNC: 97 MG/DL (ref 70–108)
GLUCOSE, URINE: 250 MG/DL
GLUCOSE, URINE: NEGATIVE MG/DL
GLUCOSE, URINE: NEGATIVE MG/DL
GRAM STAIN RESULT: NORMAL
HAEMOPHILUS INFLUENZAE BY PCR: NOT DETECTED
HAV IGM SER IA-ACNC: NEGATIVE
HBA1C MFR BLD: 5.5 % (ref 4.4–6.4)
HCO3: 27 MMOL/L (ref 23–28)
HCO3: 28 MMOL/L (ref 23–28)
HCO3: 29 MMOL/L (ref 23–28)
HCT VFR BLD CALC: 28.2 % (ref 42–52)
HCT VFR BLD CALC: 28.6 % (ref 42–52)
HCT VFR BLD CALC: 30.1 % (ref 42–52)
HCT VFR BLD CALC: 31.3 % (ref 42–52)
HCT VFR BLD CALC: 31.3 % (ref 42–52)
HCT VFR BLD CALC: 32.9 % (ref 42–52)
HCT VFR BLD CALC: 32.9 % (ref 42–52)
HCT VFR BLD CALC: 41.5 % (ref 42–52)
HCT VFR BLD CALC: 41.6 % (ref 42–52)
HCT VFR BLD CALC: 42.8 % (ref 42–52)
HCT VFR BLD CALC: 43.7 % (ref 42–52)
HCT VFR BLD CALC: 44.7 % (ref 42–52)
HDLC SERPL-MCNC: 37 MG/DL
HEMOGLOBIN: 10.3 GM/DL (ref 14–18)
HEMOGLOBIN: 10.3 GM/DL (ref 14–18)
HEMOGLOBIN: 10.5 GM/DL (ref 14–18)
HEMOGLOBIN: 10.9 GM/DL (ref 14–18)
HEMOGLOBIN: 13.9 GM/DL (ref 14–18)
HEMOGLOBIN: 14.2 GM/DL (ref 14–18)
HEMOGLOBIN: 14.2 GM/DL (ref 14–18)
HEMOGLOBIN: 15 GM/DL (ref 14–18)
HEMOGLOBIN: 15.3 GM/DL (ref 14–18)
HEMOGLOBIN: 8.9 GM/DL (ref 14–18)
HEMOGLOBIN: 9.1 GM/DL (ref 14–18)
HEMOGLOBIN: 9.6 GM/DL (ref 14–18)
HEPARIN THERAPY: NO
HEPATITIS B CORE IGM ANTIBODY: NEGATIVE
HEPATITIS B SURFACE ANTIGEN: NEGATIVE
HEPATITIS C ANTIBODY: NEGATIVE
IFIO2: 25
IFIO2: 30
IFIO2: 30
IMMATURE GRANS (ABS): 0.01 THOU/MM3 (ref 0–0.07)
IMMATURE GRANS (ABS): 0.02 THOU/MM3 (ref 0–0.07)
IMMATURE GRANS (ABS): 0.03 THOU/MM3 (ref 0–0.07)
IMMATURE GRANS (ABS): 0.05 THOU/MM3 (ref 0–0.07)
IMMATURE GRANULOCYTES: 0.2 %
IMMATURE GRANULOCYTES: 0.3 %
IMMATURE GRANULOCYTES: 0.3 %
IMMATURE GRANULOCYTES: 0.4 %
INFLUENZA A BY PCR: NOT DETECTED
INFLUENZA B BY PCR: NOT DETECTED
INR BLD: 1.17 (ref 0.85–1.13)
INR BLD: 1.18 (ref 0.85–1.13)
INR BLD: 1.19 (ref 0.85–1.13)
INR BLD: 1.2 (ref 0.85–1.13)
INR BLD: 1.21 (ref 0.85–1.13)
INR BLD: 1.25 (ref 0.85–1.13)
INR BLD: 1.28 (ref 0.85–1.13)
INR BLD: 1.3 (ref 0.85–1.13)
INR BLD: 1.43 (ref 0.85–1.13)
INR BLD: 1.84 (ref 0.85–1.13)
INR BLD: 2.84 (ref 0.85–1.13)
INR BLD: 3.14 (ref 0.85–1.13)
KETONES, URINE: 15
KETONES, URINE: NEGATIVE
KETONES, URINE: NEGATIVE
KINETICS RAPID TEG: 1.7 MINUTES (ref 0.5–2.3)
KLEBSIELLA AEROGENES BY PCR: NOT DETECTED
KLEBSIELLA OXYTOCA BY PCR: NOT DETECTED
KLEBSIELLA PNEUMONIAE GROUP BY PCR: NOT DETECTED
LACTIC ACID: 0.5 MMOL/L (ref 0.5–2.2)
LACTIC ACID: 0.5 MMOL/L (ref 0.5–2.2)
LD: 369 U/L (ref 100–190)
LD: 375 U/L (ref 100–190)
LDL CHOLESTEROL CALCULATED: 120 MG/DL
LEGIONELLA PNEUMOPHILIA BY PCR: NOT DETECTED
LEUKOCYTE EST, POC: NEGATIVE
LIPASE: 53.6 U/L (ref 5.6–51.3)
LV EF: 58 %
LV EF: 58 %
LVEF MODALITY: NORMAL
LVEF MODALITY: NORMAL
LY30 (LYSIS) TEG: 0 % (ref 0–7.5)
LYMPHOCYTES # BLD: 14.9 %
LYMPHOCYTES # BLD: 38.5 %
LYMPHOCYTES # BLD: 47.3 %
LYMPHOCYTES # BLD: 8.9 %
LYMPHOCYTES ABSOLUTE: 1.1 THOU/MM3 (ref 1–4.8)
LYMPHOCYTES ABSOLUTE: 1.6 THOU/MM3 (ref 1–4.8)
LYMPHOCYTES ABSOLUTE: 2.1 THOU/MM3 (ref 1–4.8)
LYMPHOCYTES ABSOLUTE: 3 THOU/MM3 (ref 1–4.8)
MA(MAX CLOT) RAPID TEG: 62 MM (ref 52–71)
MAGNESIUM: 2 MG/DL (ref 1.6–2.4)
MAGNESIUM: 2 MG/DL (ref 1.6–2.4)
MAGNESIUM: 2.1 MG/DL (ref 1.6–2.4)
MAGNESIUM: 2.1 MG/DL (ref 1.6–2.4)
MAGNESIUM: 2.2 MG/DL (ref 1.6–2.4)
MAGNESIUM: 2.3 MG/DL (ref 1.6–2.4)
MAGNESIUM: 2.4 MG/DL (ref 1.6–2.4)
MCH RBC QN AUTO: 31.4 PG (ref 26–33)
MCH RBC QN AUTO: 31.8 PG (ref 26–33)
MCH RBC QN AUTO: 31.9 PG (ref 26–33)
MCH RBC QN AUTO: 32 PG (ref 26–33)
MCH RBC QN AUTO: 32 PG (ref 26–33)
MCH RBC QN AUTO: 32.2 PG (ref 26–33)
MCH RBC QN AUTO: 32.2 PG (ref 26–33)
MCH RBC QN AUTO: 32.3 PG (ref 26–33)
MCH RBC QN AUTO: 32.4 PG (ref 26–33)
MCHC RBC AUTO-ENTMCNC: 31.6 GM/DL (ref 32.2–35.5)
MCHC RBC AUTO-ENTMCNC: 31.8 GM/DL (ref 32.2–35.5)
MCHC RBC AUTO-ENTMCNC: 31.9 GM/DL (ref 32.2–35.5)
MCHC RBC AUTO-ENTMCNC: 31.9 GM/DL (ref 32.2–35.5)
MCHC RBC AUTO-ENTMCNC: 32.9 GM/DL (ref 32.2–35.5)
MCHC RBC AUTO-ENTMCNC: 32.9 GM/DL (ref 32.2–35.5)
MCHC RBC AUTO-ENTMCNC: 33.1 GM/DL (ref 32.2–35.5)
MCHC RBC AUTO-ENTMCNC: 33.2 GM/DL (ref 32.2–35.5)
MCHC RBC AUTO-ENTMCNC: 33.4 GM/DL (ref 32.2–35.5)
MCHC RBC AUTO-ENTMCNC: 34.2 GM/DL (ref 32.2–35.5)
MCHC RBC AUTO-ENTMCNC: 34.2 GM/DL (ref 32.2–35.5)
MCHC RBC AUTO-ENTMCNC: 34.3 GM/DL (ref 32.2–35.5)
MCV RBC AUTO: 100.3 FL (ref 80–94)
MCV RBC AUTO: 100.4 FL (ref 80–94)
MCV RBC AUTO: 101.2 FL (ref 80–94)
MCV RBC AUTO: 94.1 FL (ref 80–94)
MCV RBC AUTO: 94.4 FL (ref 80–94)
MCV RBC AUTO: 94.5 FL (ref 80–94)
MCV RBC AUTO: 95.2 FL (ref 80–94)
MCV RBC AUTO: 96.4 FL (ref 80–94)
MCV RBC AUTO: 97.6 FL (ref 80–94)
MCV RBC AUTO: 97.8 FL (ref 80–94)
MCV RBC AUTO: 98.1 FL (ref 80–94)
MCV RBC AUTO: 99.6 FL (ref 80–94)
METAPNEUMOVIRUS BY PCR: NOT DETECTED
MISCELLANEOUS LAB TEST RESULT: ABNORMAL
MODE: ABNORMAL
MODE: ABNORMAL
MONOCYTES # BLD: 10.4 %
MONOCYTES # BLD: 11.2 %
MONOCYTES # BLD: 7.1 %
MONOCYTES # BLD: 7.7 %
MONOCYTES ABSOLUTE: 0.6 THOU/MM3 (ref 0.4–1.3)
MONOCYTES ABSOLUTE: 0.7 THOU/MM3 (ref 0.4–1.3)
MONOCYTES ABSOLUTE: 0.8 THOU/MM3 (ref 0.4–1.3)
MONOCYTES ABSOLUTE: 0.9 THOU/MM3 (ref 0.4–1.3)
MORAXELLA CATARRHALIS BY PCR: NOT DETECTED
MRSA SCREEN RT-PCR: NEGATIVE
MRSA SCREEN: NORMAL
MYCOPLASMA PNEUMONIAE BY PCR: NOT DETECTED
NITRITE, URINE: NEGATIVE
NUCLEATED RED BLOOD CELLS: 0 /100 WBC
O2 SATURATION: 92 %
O2 SATURATION: 95 %
O2 SATURATION: 96 %
OPIATES, URINE: NEGATIVE
OSMOLALITY CALCULATION: 284.1 MOSMOL/KG (ref 275–300)
OSMOLALITY URINE: 537 MOSMOL/KG (ref 250–750)
OSMOLALITY URINE: 578 MOSMOL/KG (ref 250–750)
OSMOLALITY: 296 MOSMOL/KG (ref 275–295)
OSMOLALITY: 303 MOSMOL/KG (ref 275–295)
OSMOLALITY: 306 MOSMOL/KG (ref 275–295)
OSMOLALITY: 307 MOSMOL/KG (ref 275–295)
OSMOLALITY: 307 MOSMOL/KG (ref 275–295)
OSMOLALITY: 314 MOSMOL/KG (ref 275–295)
OSMOLALITY: 319 MOSMOL/KG (ref 275–295)
OXYCODONE: NEGATIVE
PARAINFLUENZA VIRUS BY PCR: NOT DETECTED
PCO2: 39 MMHG (ref 35–45)
PCO2: 42 MMHG (ref 35–45)
PCO2: 44 MMHG (ref 35–45)
PH BLOOD GAS: 7.43 (ref 7.35–7.45)
PH BLOOD GAS: 7.43 (ref 7.35–7.45)
PH BLOOD GAS: 7.44 (ref 7.35–7.45)
PH UA: 5.5 (ref 5–9)
PH UA: 6 (ref 5–9)
PH UA: 6 (ref 5–9)
PHENCYCLIDINE QUANTITATIVE URINE: NEGATIVE
PHOSPHORUS: 3.1 MG/DL (ref 2.4–4.7)
PHOSPHORUS: 3.9 MG/DL (ref 2.4–4.7)
PHOSPHORUS: 4.4 MG/DL (ref 2.4–4.7)
PIP: 28 CMH2O
PLATELET # BLD: 137 THOU/MM3 (ref 130–400)
PLATELET # BLD: 141 THOU/MM3 (ref 130–400)
PLATELET # BLD: 146 THOU/MM3 (ref 130–400)
PLATELET # BLD: 148 THOU/MM3 (ref 130–400)
PLATELET # BLD: 152 THOU/MM3 (ref 130–400)
PLATELET # BLD: 152 THOU/MM3 (ref 130–400)
PLATELET # BLD: 155 THOU/MM3 (ref 130–400)
PLATELET # BLD: 157 THOU/MM3 (ref 130–400)
PLATELET # BLD: 165 THOU/MM3 (ref 130–400)
PLATELET # BLD: 165 THOU/MM3 (ref 130–400)
PLATELET # BLD: 166 THOU/MM3 (ref 130–400)
PLATELET # BLD: 170 THOU/MM3 (ref 130–400)
PMV BLD AUTO: 10.2 FL (ref 9.4–12.4)
PMV BLD AUTO: 10.4 FL (ref 9.4–12.4)
PMV BLD AUTO: 10.5 FL (ref 9.4–12.4)
PMV BLD AUTO: 10.6 FL (ref 9.4–12.4)
PMV BLD AUTO: 10.7 FL (ref 9.4–12.4)
PMV BLD AUTO: 10.8 FL (ref 9.4–12.4)
PMV BLD AUTO: 10.8 FL (ref 9.4–12.4)
PMV BLD AUTO: 11 FL (ref 9.4–12.4)
PO2: 62 MMHG (ref 71–104)
PO2: 73 MMHG (ref 71–104)
PO2: 79 MMHG (ref 71–104)
POTASSIUM REFLEX MAGNESIUM: 3.9 MEQ/L (ref 3.5–5.2)
POTASSIUM SERPL-SCNC: 3.3 MEQ/L (ref 3.5–5.2)
POTASSIUM SERPL-SCNC: 3.5 MEQ/L (ref 3.5–5.2)
POTASSIUM SERPL-SCNC: 3.6 MEQ/L (ref 3.5–5.2)
POTASSIUM SERPL-SCNC: 3.7 MEQ/L (ref 3.5–5.2)
POTASSIUM SERPL-SCNC: 3.9 MEQ/L (ref 3.5–5.2)
POTASSIUM SERPL-SCNC: 4.2 MEQ/L (ref 3.5–5.2)
POTASSIUM SERPL-SCNC: 4.2 MEQ/L (ref 3.5–5.2)
PRO-BNP: 35.8 PG/ML (ref 0–900)
PROTEIN UA: 30 MG/DL
PROTEIN UA: 30 MG/DL
PROTEIN UA: ABNORMAL MG/DL
PROTEUS SPECIES BY PCR: NOT DETECTED
PSEUDOMONAS AERUGINOSA BY PCR: NOT DETECTED
RBC # BLD: 2.83 MILL/MM3 (ref 4.7–6.1)
RBC # BLD: 2.85 MILL/MM3 (ref 4.7–6.1)
RBC # BLD: 3 MILL/MM3 (ref 4.7–6.1)
RBC # BLD: 3.19 MILL/MM3 (ref 4.7–6.1)
RBC # BLD: 3.2 MILL/MM3 (ref 4.7–6.1)
RBC # BLD: 3.25 MILL/MM3 (ref 4.7–6.1)
RBC # BLD: 3.37 MILL/MM3 (ref 4.7–6.1)
RBC # BLD: 4.37 MILL/MM3 (ref 4.7–6.1)
RBC # BLD: 4.41 MILL/MM3 (ref 4.7–6.1)
RBC # BLD: 4.44 MILL/MM3 (ref 4.7–6.1)
RBC # BLD: 4.63 MILL/MM3 (ref 4.7–6.1)
RBC # BLD: 4.73 MILL/MM3 (ref 4.7–6.1)
RBC URINE: ABNORMAL /HPF
REACTION TIME RAPID TEG: 0.8 MINUTES (ref 0.4–1)
RENAL EPITHELIAL, UA: ABNORMAL
RESISTANT GENE CTX-M BY PCR: NORMAL
RESISTANT GENE IMP BY PCR: NORMAL
RESISTANT GENE KPC BY PCR: NORMAL
RESISTANT GENE MECA/C & MREJ BY PCR: NORMAL
RESISTANT GENE NDM BY PCR: NORMAL
RESISTANT GENE OXA-48-LIKE BY PCR: NORMAL
RESISTANT GENE VIM BY PCR: NORMAL
RESPIRATORY CULTURE: NORMAL
RESPIRATORY SYNCYTIAL VIRUS BY PCR: NOT DETECTED
RH FACTOR: NORMAL
RHINOVIRUS ENTEROVIRUS PCR: NOT DETECTED
SARS-COV-2 ANTIBODY, TOTAL: POSITIVE
SARS-COV-2, NAAT: NOT DETECTED
SARS-COV-2, NAAT: NOT DETECTED
SEG NEUTROPHILS: 38.8 %
SEG NEUTROPHILS: 46.9 %
SEG NEUTROPHILS: 76.8 %
SEG NEUTROPHILS: 82.7 %
SEGMENTED NEUTROPHILS ABSOLUTE COUNT: 10.1 THOU/MM3 (ref 1.8–7.7)
SEGMENTED NEUTROPHILS ABSOLUTE COUNT: 2.4 THOU/MM3 (ref 1.8–7.7)
SEGMENTED NEUTROPHILS ABSOLUTE COUNT: 2.6 THOU/MM3 (ref 1.8–7.7)
SEGMENTED NEUTROPHILS ABSOLUTE COUNT: 8.4 THOU/MM3 (ref 1.8–7.7)
SERRATIA MARCESCENS BY PCR: NOT DETECTED
SET PEEP: 6 MMHG
SET PEEP: 6 MMHG
SET RESPIRATORY RATE: 12 BPM
SODIUM BLD-SCNC: 138 MEQ/L (ref 135–145)
SODIUM BLD-SCNC: 139 MEQ/L (ref 135–145)
SODIUM BLD-SCNC: 140 MEQ/L (ref 135–145)
SODIUM BLD-SCNC: 141 MEQ/L (ref 135–145)
SODIUM BLD-SCNC: 142 MEQ/L (ref 135–145)
SODIUM BLD-SCNC: 143 MEQ/L (ref 135–145)
SODIUM BLD-SCNC: 144 MEQ/L (ref 135–145)
SODIUM BLD-SCNC: 145 MEQ/L (ref 135–145)
SODIUM BLD-SCNC: 146 MEQ/L (ref 135–145)
SODIUM BLD-SCNC: 146 MEQ/L (ref 135–145)
SODIUM BLD-SCNC: 147 MEQ/L (ref 135–145)
SODIUM BLD-SCNC: 148 MEQ/L (ref 135–145)
SODIUM BLD-SCNC: 149 MEQ/L (ref 135–145)
SODIUM BLD-SCNC: 149 MEQ/L (ref 135–145)
SODIUM BLD-SCNC: 150 MEQ/L (ref 135–145)
SODIUM BLD-SCNC: 150 MEQ/L (ref 135–145)
SODIUM BLD-SCNC: 151 MEQ/L (ref 135–145)
SODIUM URINE: < 20 MEQ/L
SOURCE, BLOOD GAS: ABNORMAL
SOURCE: NORMAL
SPECIFIC GRAVITY UA: 1.01 (ref 1–1.03)
SPECIFIC GRAVITY UA: 1.02 (ref 1–1.03)
SPECIFIC GRAVITY UA: > 1.03 (ref 1–1.03)
SPECIMEN ACCEPTABILITY: NORMAL
STAPH AUREUS BY PCR: NOT DETECTED
STREP AGALACTIAE BY PCR: NOT DETECTED
STREP PNEUMONIAE BY PCR: NOT DETECTED
STREP PYOGENES BY PCR: NOT DETECTED
TOTAL PROTEIN: 5.8 G/DL (ref 6.1–8)
TOTAL PROTEIN: 5.9 G/DL (ref 6.1–8)
TOTAL PROTEIN: 6.2 G/DL (ref 6.1–8)
TOTAL PROTEIN: 7.2 G/DL (ref 6.1–8)
TRIGL SERPL-MCNC: 156 MG/DL (ref 0–199)
TROPONIN T: < 0.01 NG/ML
TSH SERPL DL<=0.05 MIU/L-ACNC: 2.29 UIU/ML (ref 0.4–4.2)
UROBILINOGEN, URINE: 0.2 EU/DL (ref 0–1)
UROBILINOGEN, URINE: 1 EU/DL (ref 0–1)
UROBILINOGEN, URINE: 1 EU/DL (ref 0–1)
VANCOMYCIN RESISTANT ENTEROCOCCUS: NEGATIVE
VITAMIN B-12: 531 PG/ML (ref 211–911)
WBC # BLD: 10.2 THOU/MM3 (ref 4.8–10.8)
WBC # BLD: 11 THOU/MM3 (ref 4.8–10.8)
WBC # BLD: 12.2 THOU/MM3 (ref 4.8–10.8)
WBC # BLD: 4.8 THOU/MM3 (ref 4.8–10.8)
WBC # BLD: 5.5 THOU/MM3 (ref 4.8–10.8)
WBC # BLD: 6.3 THOU/MM3 (ref 4.8–10.8)
WBC # BLD: 7.3 THOU/MM3 (ref 4.8–10.8)
WBC # BLD: 7.3 THOU/MM3 (ref 4.8–10.8)
WBC # BLD: 7.5 THOU/MM3 (ref 4.8–10.8)
WBC # BLD: 9 THOU/MM3 (ref 4.8–10.8)
WBC # BLD: 9.1 THOU/MM3 (ref 4.8–10.8)
WBC # BLD: 9.8 THOU/MM3 (ref 4.8–10.8)
WBC UA: ABNORMAL /HPF
YEAST: ABNORMAL

## 2021-01-01 PROCEDURE — 83605 ASSAY OF LACTIC ACID: CPT

## 2021-01-01 PROCEDURE — 6370000000 HC RX 637 (ALT 250 FOR IP): Performed by: PHYSICIAN ASSISTANT

## 2021-01-01 PROCEDURE — 6370000000 HC RX 637 (ALT 250 FOR IP): Performed by: FAMILY MEDICINE

## 2021-01-01 PROCEDURE — 94003 VENT MGMT INPAT SUBQ DAY: CPT

## 2021-01-01 PROCEDURE — 2580000003 HC RX 258: Performed by: NEUROLOGICAL SURGERY

## 2021-01-01 PROCEDURE — 6360000002 HC RX W HCPCS: Performed by: PHYSICIAN ASSISTANT

## 2021-01-01 PROCEDURE — 6370000000 HC RX 637 (ALT 250 FOR IP)

## 2021-01-01 PROCEDURE — 6360000002 HC RX W HCPCS: Performed by: NEUROLOGICAL SURGERY

## 2021-01-01 PROCEDURE — 70450 CT HEAD/BRAIN W/O DYE: CPT

## 2021-01-01 PROCEDURE — 83935 ASSAY OF URINE OSMOLALITY: CPT

## 2021-01-01 PROCEDURE — 7100000010 HC PHASE II RECOVERY - FIRST 15 MIN: Performed by: INTERNAL MEDICINE

## 2021-01-01 PROCEDURE — 6370000000 HC RX 637 (ALT 250 FOR IP): Performed by: STUDENT IN AN ORGANIZED HEALTH CARE EDUCATION/TRAINING PROGRAM

## 2021-01-01 PROCEDURE — 6360000004 HC RX CONTRAST MEDICATION: Performed by: INTERNAL MEDICINE

## 2021-01-01 PROCEDURE — 37799 UNLISTED PX VASCULAR SURGERY: CPT

## 2021-01-01 PROCEDURE — 85027 COMPLETE CBC AUTOMATED: CPT

## 2021-01-01 PROCEDURE — 83735 ASSAY OF MAGNESIUM: CPT

## 2021-01-01 PROCEDURE — 0BH18EZ INSERTION OF ENDOTRACHEAL AIRWAY INTO TRACHEA, VIA NATURAL OR ARTIFICIAL OPENING ENDOSCOPIC: ICD-10-PCS | Performed by: INTERNAL MEDICINE

## 2021-01-01 PROCEDURE — 6370000000 HC RX 637 (ALT 250 FOR IP): Performed by: INTERNAL MEDICINE

## 2021-01-01 PROCEDURE — 2580000003 HC RX 258: Performed by: PHYSICIAN ASSISTANT

## 2021-01-01 PROCEDURE — 85610 PROTHROMBIN TIME: CPT

## 2021-01-01 PROCEDURE — 2500000003 HC RX 250 WO HCPCS

## 2021-01-01 PROCEDURE — 85730 THROMBOPLASTIN TIME PARTIAL: CPT

## 2021-01-01 PROCEDURE — 36415 COLL VENOUS BLD VENIPUNCTURE: CPT

## 2021-01-01 PROCEDURE — 87798 DETECT AGENT NOS DNA AMP: CPT

## 2021-01-01 PROCEDURE — 0BJ08ZZ INSPECTION OF TRACHEOBRONCHIAL TREE, VIA NATURAL OR ARTIFICIAL OPENING ENDOSCOPIC: ICD-10-PCS | Performed by: INTERNAL MEDICINE

## 2021-01-01 PROCEDURE — C1713 ANCHOR/SCREW BN/BN,TIS/BN: HCPCS | Performed by: NEUROLOGICAL SURGERY

## 2021-01-01 PROCEDURE — 6370000000 HC RX 637 (ALT 250 FOR IP): Performed by: NURSE PRACTITIONER

## 2021-01-01 PROCEDURE — 82977 ASSAY OF GGT: CPT

## 2021-01-01 PROCEDURE — 99239 HOSP IP/OBS DSCHRG MGMT >30: CPT | Performed by: INTERNAL MEDICINE

## 2021-01-01 PROCEDURE — 2500000003 HC RX 250 WO HCPCS: Performed by: NURSE PRACTITIONER

## 2021-01-01 PROCEDURE — 94761 N-INVAS EAR/PLS OXIMETRY MLT: CPT

## 2021-01-01 PROCEDURE — 74018 RADEX ABDOMEN 1 VIEW: CPT

## 2021-01-01 PROCEDURE — 70551 MRI BRAIN STEM W/O DYE: CPT

## 2021-01-01 PROCEDURE — 93312 ECHO TRANSESOPHAGEAL: CPT

## 2021-01-01 PROCEDURE — 71260 CT THORAX DX C+: CPT

## 2021-01-01 PROCEDURE — 31500 INSERT EMERGENCY AIRWAY: CPT | Performed by: FAMILY MEDICINE

## 2021-01-01 PROCEDURE — 82948 REAGENT STRIP/BLOOD GLUCOSE: CPT

## 2021-01-01 PROCEDURE — 99233 SBSQ HOSP IP/OBS HIGH 50: CPT | Performed by: INTERNAL MEDICINE

## 2021-01-01 PROCEDURE — 99291 CRITICAL CARE FIRST HOUR: CPT | Performed by: INTERNAL MEDICINE

## 2021-01-01 PROCEDURE — 99024 POSTOP FOLLOW-UP VISIT: CPT | Performed by: NEUROLOGICAL SURGERY

## 2021-01-01 PROCEDURE — 82077 ASSAY SPEC XCP UR&BREATH IA: CPT

## 2021-01-01 PROCEDURE — 83930 ASSAY OF BLOOD OSMOLALITY: CPT

## 2021-01-01 PROCEDURE — 2580000003 HC RX 258: Performed by: STUDENT IN AN ORGANIZED HEALTH CARE EDUCATION/TRAINING PROGRAM

## 2021-01-01 PROCEDURE — 86769 SARS-COV-2 COVID-19 ANTIBODY: CPT

## 2021-01-01 PROCEDURE — 2500000003 HC RX 250 WO HCPCS: Performed by: STUDENT IN AN ORGANIZED HEALTH CARE EDUCATION/TRAINING PROGRAM

## 2021-01-01 PROCEDURE — 81001 URINALYSIS AUTO W/SCOPE: CPT

## 2021-01-01 PROCEDURE — 83036 HEMOGLOBIN GLYCOSYLATED A1C: CPT

## 2021-01-01 PROCEDURE — 80048 BASIC METABOLIC PNL TOTAL CA: CPT

## 2021-01-01 PROCEDURE — 2720000010 HC SURG SUPPLY STERILE: Performed by: INTERNAL MEDICINE

## 2021-01-01 PROCEDURE — C1887 CATHETER, GUIDING: HCPCS

## 2021-01-01 PROCEDURE — 82248 BILIRUBIN DIRECT: CPT

## 2021-01-01 PROCEDURE — 36592 COLLECT BLOOD FROM PICC: CPT

## 2021-01-01 PROCEDURE — U0002 COVID-19 LAB TEST NON-CDC: HCPCS

## 2021-01-01 PROCEDURE — 1200000003 HC TELEMETRY R&B

## 2021-01-01 PROCEDURE — 2580000003 HC RX 258: Performed by: NURSE PRACTITIONER

## 2021-01-01 PROCEDURE — 2000000000 HC ICU R&B

## 2021-01-01 PROCEDURE — 6360000002 HC RX W HCPCS: Performed by: NURSE PRACTITIONER

## 2021-01-01 PROCEDURE — 61210 BURR HOLE IMPLT VENTR CATH: CPT | Performed by: NEUROLOGICAL SURGERY

## 2021-01-01 PROCEDURE — 84132 ASSAY OF SERUM POTASSIUM: CPT

## 2021-01-01 PROCEDURE — 6360000004 HC RX CONTRAST MEDICATION: Performed by: NURSE PRACTITIONER

## 2021-01-01 PROCEDURE — 4A023N7 MEASUREMENT OF CARDIAC SAMPLING AND PRESSURE, LEFT HEART, PERCUTANEOUS APPROACH: ICD-10-PCS | Performed by: INTERNAL MEDICINE

## 2021-01-01 PROCEDURE — 99232 SBSQ HOSP IP/OBS MODERATE 35: CPT | Performed by: INTERNAL MEDICINE

## 2021-01-01 PROCEDURE — 99223 1ST HOSP IP/OBS HIGH 75: CPT | Performed by: INTERNAL MEDICINE

## 2021-01-01 PROCEDURE — 99253 IP/OBS CNSLTJ NEW/EST LOW 45: CPT | Performed by: NURSE PRACTITIONER

## 2021-01-01 PROCEDURE — 6360000002 HC RX W HCPCS: Performed by: STUDENT IN AN ORGANIZED HEALTH CARE EDUCATION/TRAINING PROGRAM

## 2021-01-01 PROCEDURE — 2700000000 HC OXYGEN THERAPY PER DAY

## 2021-01-01 PROCEDURE — 86901 BLOOD TYPING SEROLOGIC RH(D): CPT

## 2021-01-01 PROCEDURE — 96374 THER/PROPH/DIAG INJ IV PUSH: CPT | Performed by: INTERNAL MEDICINE

## 2021-01-01 PROCEDURE — 87486 CHLMYD PNEUM DNA AMP PROBE: CPT

## 2021-01-01 PROCEDURE — 78452 HT MUSCLE IMAGE SPECT MULT: CPT

## 2021-01-01 PROCEDURE — 71250 CT THORAX DX C-: CPT

## 2021-01-01 PROCEDURE — 61313 CRNEC/CRNOT STTL ICERE: CPT | Performed by: PHYSICIAN ASSISTANT

## 2021-01-01 PROCEDURE — 6360000002 HC RX W HCPCS

## 2021-01-01 PROCEDURE — 84300 ASSAY OF URINE SODIUM: CPT

## 2021-01-01 PROCEDURE — 87581 M.PNEUMON DNA AMP PROBE: CPT

## 2021-01-01 PROCEDURE — 51702 INSERT TEMP BLADDER CATH: CPT

## 2021-01-01 PROCEDURE — 2500000003 HC RX 250 WO HCPCS: Performed by: PHYSICIAN ASSISTANT

## 2021-01-01 PROCEDURE — 93880 EXTRACRANIAL BILAT STUDY: CPT

## 2021-01-01 PROCEDURE — 82803 BLOOD GASES ANY COMBINATION: CPT

## 2021-01-01 PROCEDURE — 3700000000 HC ANESTHESIA ATTENDED CARE: Performed by: NEUROLOGICAL SURGERY

## 2021-01-01 PROCEDURE — 74177 CT ABD & PELVIS W/CONTRAST: CPT

## 2021-01-01 PROCEDURE — 93970 EXTREMITY STUDY: CPT

## 2021-01-01 PROCEDURE — 83615 LACTATE (LD) (LDH) ENZYME: CPT

## 2021-01-01 PROCEDURE — 84443 ASSAY THYROID STIM HORMONE: CPT

## 2021-01-01 PROCEDURE — 70544 MR ANGIOGRAPHY HEAD W/O DYE: CPT

## 2021-01-01 PROCEDURE — 87081 CULTURE SCREEN ONLY: CPT

## 2021-01-01 PROCEDURE — 95822 EEG COMA OR SLEEP ONLY: CPT | Performed by: PSYCHIATRY & NEUROLOGY

## 2021-01-01 PROCEDURE — 93005 ELECTROCARDIOGRAM TRACING: CPT | Performed by: EMERGENCY MEDICINE

## 2021-01-01 PROCEDURE — 93458 L HRT ARTERY/VENTRICLE ANGIO: CPT | Performed by: INTERNAL MEDICINE

## 2021-01-01 PROCEDURE — G0480 DRUG TEST DEF 1-7 CLASSES: HCPCS

## 2021-01-01 PROCEDURE — 99222 1ST HOSP IP/OBS MODERATE 55: CPT | Performed by: PSYCHIATRY & NEUROLOGY

## 2021-01-01 PROCEDURE — 80307 DRUG TEST PRSMV CHEM ANLYZR: CPT

## 2021-01-01 PROCEDURE — 2580000003 HC RX 258: Performed by: FAMILY MEDICINE

## 2021-01-01 PROCEDURE — 87205 SMEAR GRAM STAIN: CPT

## 2021-01-01 PROCEDURE — B2151ZZ FLUOROSCOPY OF LEFT HEART USING LOW OSMOLAR CONTRAST: ICD-10-PCS | Performed by: INTERNAL MEDICINE

## 2021-01-01 PROCEDURE — 6370000000 HC RX 637 (ALT 250 FOR IP): Performed by: NEUROLOGICAL SURGERY

## 2021-01-01 PROCEDURE — 36556 INSERT NON-TUNNEL CV CATH: CPT | Performed by: FAMILY MEDICINE

## 2021-01-01 PROCEDURE — 84295 ASSAY OF SERUM SODIUM: CPT

## 2021-01-01 PROCEDURE — 2709999900 HC NON-CHARGEABLE SUPPLY: Performed by: INTERNAL MEDICINE

## 2021-01-01 PROCEDURE — 3430000000 HC RX DIAGNOSTIC RADIOPHARMACEUTICAL: Performed by: INTERNAL MEDICINE

## 2021-01-01 PROCEDURE — 87070 CULTURE OTHR SPECIMN AEROBIC: CPT

## 2021-01-01 PROCEDURE — 2580000003 HC RX 258: Performed by: INTERNAL MEDICINE

## 2021-01-01 PROCEDURE — 3600000015 HC SURGERY LEVEL 5 ADDTL 15MIN: Performed by: NEUROLOGICAL SURGERY

## 2021-01-01 PROCEDURE — 80053 COMPREHEN METABOLIC PANEL: CPT

## 2021-01-01 PROCEDURE — 93306 TTE W/DOPPLER COMPLETE: CPT

## 2021-01-01 PROCEDURE — 009630Z DRAINAGE OF CEREBRAL VENTRICLE WITH DRAINAGE DEVICE, PERCUTANEOUS APPROACH: ICD-10-PCS | Performed by: NEUROLOGICAL SURGERY

## 2021-01-01 PROCEDURE — 71045 X-RAY EXAM CHEST 1 VIEW: CPT

## 2021-01-01 PROCEDURE — 99291 CRITICAL CARE FIRST HOUR: CPT | Performed by: NEUROLOGICAL SURGERY

## 2021-01-01 PROCEDURE — 87086 URINE CULTURE/COLONY COUNT: CPT

## 2021-01-01 PROCEDURE — 93312 ECHO TRANSESOPHAGEAL: CPT | Performed by: INTERNAL MEDICINE

## 2021-01-01 PROCEDURE — 85025 COMPLETE CBC W/AUTO DIFF WBC: CPT

## 2021-01-01 PROCEDURE — 61313 CRNEC/CRNOT STTL ICERE: CPT | Performed by: NEUROLOGICAL SURGERY

## 2021-01-01 PROCEDURE — 99255 IP/OBS CONSLTJ NEW/EST HI 80: CPT | Performed by: THORACIC SURGERY (CARDIOTHORACIC VASCULAR SURGERY)

## 2021-01-01 PROCEDURE — 31645 BRNCHSC W/THER ASPIR 1ST: CPT | Performed by: INTERNAL MEDICINE

## 2021-01-01 PROCEDURE — 93971 EXTREMITY STUDY: CPT

## 2021-01-01 PROCEDURE — 93320 DOPPLER ECHO COMPLETE: CPT

## 2021-01-01 PROCEDURE — 82607 VITAMIN B-12: CPT

## 2021-01-01 PROCEDURE — 36556 INSERT NON-TUNNEL CV CATH: CPT

## 2021-01-01 PROCEDURE — 02HV33Z INSERTION OF INFUSION DEVICE INTO SUPERIOR VENA CAVA, PERCUTANEOUS APPROACH: ICD-10-PCS | Performed by: STUDENT IN AN ORGANIZED HEALTH CARE EDUCATION/TRAINING PROGRAM

## 2021-01-01 PROCEDURE — C1769 GUIDE WIRE: HCPCS

## 2021-01-01 PROCEDURE — 6360000002 HC RX W HCPCS: Performed by: FAMILY MEDICINE

## 2021-01-01 PROCEDURE — B2111ZZ FLUOROSCOPY OF MULTIPLE CORONARY ARTERIES USING LOW OSMOLAR CONTRAST: ICD-10-PCS | Performed by: INTERNAL MEDICINE

## 2021-01-01 PROCEDURE — 2709999900 HC NON-CHARGEABLE SUPPLY: Performed by: NEUROLOGICAL SURGERY

## 2021-01-01 PROCEDURE — A9500 TC99M SESTAMIBI: HCPCS | Performed by: INTERNAL MEDICINE

## 2021-01-01 PROCEDURE — 87641 MR-STAPH DNA AMP PROBE: CPT

## 2021-01-01 PROCEDURE — 5A1955Z RESPIRATORY VENTILATION, GREATER THAN 96 CONSECUTIVE HOURS: ICD-10-PCS | Performed by: INTERNAL MEDICINE

## 2021-01-01 PROCEDURE — APPSS30 APP SPLIT SHARED TIME 16-30 MINUTES: Performed by: PHYSICIAN ASSISTANT

## 2021-01-01 PROCEDURE — 94760 N-INVAS EAR/PLS OXIMETRY 1: CPT

## 2021-01-01 PROCEDURE — P9035 PLATELET PHERES LEUKOREDUCED: HCPCS

## 2021-01-01 PROCEDURE — 2709999900 HC NON-CHARGEABLE SUPPLY

## 2021-01-01 PROCEDURE — 82746 ASSAY OF FOLIC ACID SERUM: CPT

## 2021-01-01 PROCEDURE — 82330 ASSAY OF CALCIUM: CPT

## 2021-01-01 PROCEDURE — 88304 TISSUE EXAM BY PATHOLOGIST: CPT

## 2021-01-01 PROCEDURE — 03HY32Z INSERTION OF MONITORING DEVICE INTO UPPER ARTERY, PERCUTANEOUS APPROACH: ICD-10-PCS | Performed by: ANESTHESIOLOGY

## 2021-01-01 PROCEDURE — 93017 CV STRESS TEST TRACING ONLY: CPT | Performed by: INTERNAL MEDICINE

## 2021-01-01 PROCEDURE — 99222 1ST HOSP IP/OBS MODERATE 55: CPT | Performed by: FAMILY MEDICINE

## 2021-01-01 PROCEDURE — C1894 INTRO/SHEATH, NON-LASER: HCPCS

## 2021-01-01 PROCEDURE — 6360000002 HC RX W HCPCS: Performed by: ANESTHESIOLOGY

## 2021-01-01 PROCEDURE — 87040 BLOOD CULTURE FOR BACTERIA: CPT

## 2021-01-01 PROCEDURE — 84100 ASSAY OF PHOSPHORUS: CPT

## 2021-01-01 PROCEDURE — 99232 SBSQ HOSP IP/OBS MODERATE 35: CPT | Performed by: PHYSICIAN ASSISTANT

## 2021-01-01 PROCEDURE — 2500000003 HC RX 250 WO HCPCS: Performed by: ANESTHESIOLOGY

## 2021-01-01 PROCEDURE — 3609027000 HC BRONCHOSCOPY

## 2021-01-01 PROCEDURE — 84484 ASSAY OF TROPONIN QUANT: CPT

## 2021-01-01 PROCEDURE — 83690 ASSAY OF LIPASE: CPT

## 2021-01-01 PROCEDURE — C1729 CATH, DRAINAGE: HCPCS | Performed by: NEUROLOGICAL SURGERY

## 2021-01-01 PROCEDURE — 6360000002 HC RX W HCPCS: Performed by: INTERNAL MEDICINE

## 2021-01-01 PROCEDURE — 80061 LIPID PANEL: CPT

## 2021-01-01 PROCEDURE — 3700000001 HC ADD 15 MINUTES (ANESTHESIA): Performed by: NEUROLOGICAL SURGERY

## 2021-01-01 PROCEDURE — P9045 ALBUMIN (HUMAN), 5%, 250 ML: HCPCS | Performed by: ANESTHESIOLOGY

## 2021-01-01 PROCEDURE — C1729 CATH, DRAINAGE: HCPCS | Performed by: INTERNAL MEDICINE

## 2021-01-01 PROCEDURE — 94002 VENT MGMT INPAT INIT DAY: CPT

## 2021-01-01 PROCEDURE — 93325 DOPPLER ECHO COLOR FLOW MAPG: CPT

## 2021-01-01 PROCEDURE — 2720000010 HC SURG SUPPLY STERILE

## 2021-01-01 PROCEDURE — 93005 ELECTROCARDIOGRAM TRACING: CPT | Performed by: NURSE PRACTITIONER

## 2021-01-01 PROCEDURE — 7100000011 HC PHASE II RECOVERY - ADDTL 15 MIN: Performed by: INTERNAL MEDICINE

## 2021-01-01 PROCEDURE — 93458 L HRT ARTERY/VENTRICLE ANGIO: CPT

## 2021-01-01 PROCEDURE — 6360000004 HC RX CONTRAST MEDICATION: Performed by: FAMILY MEDICINE

## 2021-01-01 PROCEDURE — 87500 VANOMYCIN DNA AMP PROBE: CPT

## 2021-01-01 PROCEDURE — 80074 ACUTE HEPATITIS PANEL: CPT

## 2021-01-01 PROCEDURE — 86900 BLOOD TYPING SEROLOGIC ABO: CPT

## 2021-01-01 PROCEDURE — 95819 EEG AWAKE AND ASLEEP: CPT

## 2021-01-01 PROCEDURE — 3600000005 HC SURGERY LEVEL 5 BASE: Performed by: NEUROLOGICAL SURGERY

## 2021-01-01 PROCEDURE — 87541 LEGION PNEUMO DNA AMP PROB: CPT

## 2021-01-01 PROCEDURE — 99285 EMERGENCY DEPT VISIT HI MDM: CPT

## 2021-01-01 PROCEDURE — 2780000010 HC IMPLANT OTHER: Performed by: NEUROLOGICAL SURGERY

## 2021-01-01 PROCEDURE — 2580000003 HC RX 258: Performed by: ANESTHESIOLOGY

## 2021-01-01 PROCEDURE — 93005 ELECTROCARDIOGRAM TRACING: CPT | Performed by: STUDENT IN AN ORGANIZED HEALTH CARE EDUCATION/TRAINING PROGRAM

## 2021-01-01 PROCEDURE — 86850 RBC ANTIBODY SCREEN: CPT

## 2021-01-01 PROCEDURE — 2780000010 HC IMPLANT OTHER: Performed by: INTERNAL MEDICINE

## 2021-01-01 PROCEDURE — 87631 RESP VIRUS 3-5 TARGETS: CPT

## 2021-01-01 PROCEDURE — 6360000002 HC RX W HCPCS: Performed by: THORACIC SURGERY (CARDIOTHORACIC VASCULAR SURGERY)

## 2021-01-01 PROCEDURE — 83880 ASSAY OF NATRIURETIC PEPTIDE: CPT

## 2021-01-01 PROCEDURE — 93005 ELECTROCARDIOGRAM TRACING: CPT | Performed by: FAMILY MEDICINE

## 2021-01-01 DEVICE — PLATE BONE M L18.5MM THK0.3MM 5 H CRAN TI NONCOMPRESSION: Type: IMPLANTABLE DEVICE | Site: CRANIAL | Status: FUNCTIONAL

## 2021-01-01 DEVICE — IMPLANTABLE DEVICE: Type: IMPLANTABLE DEVICE | Status: FUNCTIONAL

## 2021-01-01 DEVICE — SCREW BNE L4MM DIA1.5MM CRAN SELF DRL CRSS DRV THINFLAP: Type: IMPLANTABLE DEVICE | Site: CRANIAL | Status: FUNCTIONAL

## 2021-01-01 DEVICE — DURAGEN® SUTURABLE DURAL REGENERATION MATRIX, 3 IN X 3 IN (7.5 CM X 7.5 CM)
Type: IMPLANTABLE DEVICE | Site: CRANIAL | Status: FUNCTIONAL
Brand: DURAGEN® SUTURABLE

## 2021-01-01 DEVICE — PLATE BUR H L DIA18.5MM 5 H TI BENT W/O TAB NONCOMPRESSION: Type: IMPLANTABLE DEVICE | Site: CRANIAL | Status: FUNCTIONAL

## 2021-01-01 RX ORDER — FENTANYL CITRATE 50 UG/ML
50 INJECTION, SOLUTION INTRAMUSCULAR; INTRAVENOUS EVERY 5 MIN PRN
Status: DISCONTINUED | OUTPATIENT
Start: 2021-01-01 | End: 2021-01-01 | Stop reason: HOSPADM

## 2021-01-01 RX ORDER — FAMOTIDINE 20 MG/1
20 TABLET, FILM COATED ORAL 2 TIMES DAILY
Status: DISCONTINUED | OUTPATIENT
Start: 2021-01-01 | End: 2021-01-01

## 2021-01-01 RX ORDER — SODIUM CHLORIDE 9 MG/ML
INJECTION, SOLUTION INTRAVENOUS CONTINUOUS
Status: DISCONTINUED | OUTPATIENT
Start: 2021-01-01 | End: 2021-01-01

## 2021-01-01 RX ORDER — NITROGLYCERIN 0.4 MG/1
TABLET SUBLINGUAL
Status: COMPLETED
Start: 2021-01-01 | End: 2021-01-01

## 2021-01-01 RX ORDER — ATORVASTATIN CALCIUM 40 MG/1
40 TABLET, FILM COATED ORAL NIGHTLY
Status: DISCONTINUED | OUTPATIENT
Start: 2021-01-01 | End: 2021-01-01

## 2021-01-01 RX ORDER — ONDANSETRON 2 MG/ML
INJECTION INTRAMUSCULAR; INTRAVENOUS
Status: COMPLETED
Start: 2021-01-01 | End: 2021-01-01

## 2021-01-01 RX ORDER — OLMESARTAN MEDOXOMIL AND HYDROCHLOROTHIAZIDE 40/12.5 40; 12.5 MG/1; MG/1
1 TABLET ORAL DAILY
Status: DISCONTINUED | OUTPATIENT
Start: 2021-01-01 | End: 2021-01-01

## 2021-01-01 RX ORDER — SODIUM CHLORIDE 0.9 % (FLUSH) 0.9 %
10 SYRINGE (ML) INJECTION PRN
Status: DISCONTINUED | OUTPATIENT
Start: 2021-01-01 | End: 2021-01-01 | Stop reason: SDUPTHER

## 2021-01-01 RX ORDER — ASPIRIN 81 MG/1
81 TABLET, CHEWABLE ORAL DAILY
Status: DISCONTINUED | OUTPATIENT
Start: 2021-01-01 | End: 2021-01-01

## 2021-01-01 RX ORDER — DIPHENHYDRAMINE HYDROCHLORIDE 50 MG/ML
12.5 INJECTION INTRAMUSCULAR; INTRAVENOUS
Status: DISCONTINUED | OUTPATIENT
Start: 2021-01-01 | End: 2021-01-01 | Stop reason: HOSPADM

## 2021-01-01 RX ORDER — 3% SODIUM CHLORIDE 3 G/100ML
20 INJECTION, SOLUTION INTRAVENOUS CONTINUOUS
Status: DISPENSED | OUTPATIENT
Start: 2021-01-01 | End: 2021-01-01

## 2021-01-01 RX ORDER — PROMETHAZINE HYDROCHLORIDE 25 MG/1
12.5 TABLET ORAL EVERY 6 HOURS PRN
Status: DISCONTINUED | OUTPATIENT
Start: 2021-01-01 | End: 2021-01-01 | Stop reason: HOSPADM

## 2021-01-01 RX ORDER — METOPROLOL SUCCINATE 50 MG/1
50 TABLET, EXTENDED RELEASE ORAL DAILY
COMMUNITY

## 2021-01-01 RX ORDER — FENTANYL CITRATE 50 UG/ML
25 INJECTION, SOLUTION INTRAMUSCULAR; INTRAVENOUS EVERY 5 MIN PRN
Status: DISCONTINUED | OUTPATIENT
Start: 2021-01-01 | End: 2021-01-01 | Stop reason: HOSPADM

## 2021-01-01 RX ORDER — MANNITOL 20 G/100ML
50 INJECTION, SOLUTION INTRAVENOUS EVERY 8 HOURS
Status: DISCONTINUED | OUTPATIENT
Start: 2021-01-01 | End: 2021-01-01

## 2021-01-01 RX ORDER — EPHEDRINE SULFATE/0.9% NACL/PF 50 MG/5 ML
SYRINGE (ML) INTRAVENOUS PRN
Status: DISCONTINUED | OUTPATIENT
Start: 2021-01-01 | End: 2021-01-01 | Stop reason: SDUPTHER

## 2021-01-01 RX ORDER — ETOMIDATE 2 MG/ML
20 INJECTION INTRAVENOUS ONCE
Status: COMPLETED | OUTPATIENT
Start: 2021-01-01 | End: 2021-01-01

## 2021-01-01 RX ORDER — LORAZEPAM 2 MG/ML
1 INJECTION INTRAMUSCULAR
Status: DISCONTINUED | OUTPATIENT
Start: 2021-01-01 | End: 2021-01-01 | Stop reason: HOSPADM

## 2021-01-01 RX ORDER — METOPROLOL SUCCINATE 50 MG/1
50 TABLET, EXTENDED RELEASE ORAL DAILY
Status: DISCONTINUED | OUTPATIENT
Start: 2021-01-01 | End: 2021-01-01 | Stop reason: SDUPTHER

## 2021-01-01 RX ORDER — SODIUM CHLORIDE 0.9 % (FLUSH) 0.9 %
10 SYRINGE (ML) INJECTION PRN
Status: DISCONTINUED | OUTPATIENT
Start: 2021-01-01 | End: 2021-01-01

## 2021-01-01 RX ORDER — MEPERIDINE HYDROCHLORIDE 25 MG/ML
12.5 INJECTION INTRAMUSCULAR; INTRAVENOUS; SUBCUTANEOUS EVERY 5 MIN PRN
Status: DISCONTINUED | OUTPATIENT
Start: 2021-01-01 | End: 2021-01-01 | Stop reason: HOSPADM

## 2021-01-01 RX ORDER — PROMETHAZINE HYDROCHLORIDE 25 MG/ML
12.5 INJECTION, SOLUTION INTRAMUSCULAR; INTRAVENOUS
Status: DISCONTINUED | OUTPATIENT
Start: 2021-01-01 | End: 2021-01-01 | Stop reason: HOSPADM

## 2021-01-01 RX ORDER — DEXTROSE MONOHYDRATE 50 MG/ML
100 INJECTION, SOLUTION INTRAVENOUS PRN
Status: DISCONTINUED | OUTPATIENT
Start: 2021-01-01 | End: 2021-01-01

## 2021-01-01 RX ORDER — PROTAMINE SULFATE 10 MG/ML
50 INJECTION, SOLUTION INTRAVENOUS ONCE
Status: COMPLETED | OUTPATIENT
Start: 2021-01-01 | End: 2021-01-01

## 2021-01-01 RX ORDER — HYDROCHLOROTHIAZIDE 25 MG/1
12.5 TABLET ORAL DAILY
Status: DISCONTINUED | OUTPATIENT
Start: 2021-01-01 | End: 2021-01-01

## 2021-01-01 RX ORDER — CEFAZOLIN SODIUM 1 G/3ML
INJECTION, POWDER, FOR SOLUTION INTRAMUSCULAR; INTRAVENOUS PRN
Status: DISCONTINUED | OUTPATIENT
Start: 2021-01-01 | End: 2021-01-01 | Stop reason: SDUPTHER

## 2021-01-01 RX ORDER — 3% SODIUM CHLORIDE 3 G/100ML
25 INJECTION, SOLUTION INTRAVENOUS CONTINUOUS
Status: DISPENSED | OUTPATIENT
Start: 2021-01-01 | End: 2021-01-01

## 2021-01-01 RX ORDER — SODIUM CHLORIDE 0.9 % (FLUSH) 0.9 %
10 SYRINGE (ML) INJECTION EVERY 12 HOURS SCHEDULED
Status: DISCONTINUED | OUTPATIENT
Start: 2021-01-01 | End: 2021-01-01

## 2021-01-01 RX ORDER — HYDRALAZINE HYDROCHLORIDE 20 MG/ML
10 INJECTION INTRAMUSCULAR; INTRAVENOUS EVERY 6 HOURS PRN
Status: DISCONTINUED | OUTPATIENT
Start: 2021-01-01 | End: 2021-01-01

## 2021-01-01 RX ORDER — ALBUMIN, HUMAN INJ 5% 5 %
SOLUTION INTRAVENOUS PRN
Status: DISCONTINUED | OUTPATIENT
Start: 2021-01-01 | End: 2021-01-01 | Stop reason: SDUPTHER

## 2021-01-01 RX ORDER — SODIUM CHLORIDE 9 MG/ML
INJECTION, SOLUTION INTRAVENOUS PRN
Status: DISCONTINUED | OUTPATIENT
Start: 2021-01-01 | End: 2021-01-01

## 2021-01-01 RX ORDER — ACETAMINOPHEN 325 MG/1
650 TABLET ORAL EVERY 6 HOURS PRN
Status: DISCONTINUED | OUTPATIENT
Start: 2021-01-01 | End: 2021-01-01

## 2021-01-01 RX ORDER — LORAZEPAM 2 MG/ML
0.5 INJECTION INTRAMUSCULAR
Status: DISCONTINUED | OUTPATIENT
Start: 2021-01-01 | End: 2021-01-01 | Stop reason: HOSPADM

## 2021-01-01 RX ORDER — ROCURONIUM BROMIDE 10 MG/ML
INJECTION, SOLUTION INTRAVENOUS PRN
Status: DISCONTINUED | OUTPATIENT
Start: 2021-01-01 | End: 2021-01-01 | Stop reason: SDUPTHER

## 2021-01-01 RX ORDER — WARFARIN SODIUM 4 MG/1
4 TABLET ORAL DAILY
Status: DISCONTINUED | OUTPATIENT
Start: 2021-01-01 | End: 2021-01-01

## 2021-01-01 RX ORDER — METOCLOPRAMIDE HYDROCHLORIDE 5 MG/ML
10 INJECTION INTRAMUSCULAR; INTRAVENOUS
Status: DISCONTINUED | OUTPATIENT
Start: 2021-01-01 | End: 2021-01-01 | Stop reason: HOSPADM

## 2021-01-01 RX ORDER — PROMETHAZINE HYDROCHLORIDE 25 MG/ML
INJECTION, SOLUTION INTRAMUSCULAR; INTRAVENOUS
Status: COMPLETED
Start: 2021-01-01 | End: 2021-01-01

## 2021-01-01 RX ORDER — FENTANYL CITRATE 50 UG/ML
INJECTION, SOLUTION INTRAMUSCULAR; INTRAVENOUS PRN
Status: DISCONTINUED | OUTPATIENT
Start: 2021-01-01 | End: 2021-01-01 | Stop reason: SDUPTHER

## 2021-01-01 RX ORDER — FENTANYL CITRATE 50 UG/ML
50 INJECTION, SOLUTION INTRAMUSCULAR; INTRAVENOUS
Status: DISCONTINUED | OUTPATIENT
Start: 2021-01-01 | End: 2021-01-01 | Stop reason: ALTCHOICE

## 2021-01-01 RX ORDER — FENTANYL CITRATE 50 UG/ML
25 INJECTION, SOLUTION INTRAMUSCULAR; INTRAVENOUS EVERY 10 MIN PRN
Status: DISCONTINUED | OUTPATIENT
Start: 2021-01-01 | End: 2021-01-01

## 2021-01-01 RX ORDER — ACETAMINOPHEN 650 MG/1
650 SUPPOSITORY RECTAL EVERY 6 HOURS PRN
Status: DISCONTINUED | OUTPATIENT
Start: 2021-01-01 | End: 2021-01-01

## 2021-01-01 RX ORDER — 0.9 % SODIUM CHLORIDE 0.9 %
1000 INTRAVENOUS SOLUTION INTRAVENOUS ONCE
Status: COMPLETED | OUTPATIENT
Start: 2021-01-01 | End: 2021-01-01

## 2021-01-01 RX ORDER — MANNITOL 250 MG/ML
50 INJECTION, SOLUTION INTRAVENOUS EVERY 8 HOURS
Status: DISCONTINUED | OUTPATIENT
Start: 2021-01-01 | End: 2021-01-01 | Stop reason: SDUPTHER

## 2021-01-01 RX ORDER — ATORVASTATIN CALCIUM 40 MG/1
40 TABLET, FILM COATED ORAL DAILY
Status: DISCONTINUED | OUTPATIENT
Start: 2021-01-01 | End: 2021-01-01

## 2021-01-01 RX ORDER — POLYETHYLENE GLYCOL 3350 17 G/17G
17 POWDER, FOR SOLUTION ORAL DAILY PRN
Status: DISCONTINUED | OUTPATIENT
Start: 2021-01-01 | End: 2021-01-01

## 2021-01-01 RX ORDER — PROPOFOL 10 MG/ML
10 INJECTION, EMULSION INTRAVENOUS
Status: DISCONTINUED | OUTPATIENT
Start: 2021-01-01 | End: 2021-01-01 | Stop reason: HOSPADM

## 2021-01-01 RX ORDER — POTASSIUM CHLORIDE 29.8 MG/ML
20 INJECTION INTRAVENOUS PRN
Status: DISCONTINUED | OUTPATIENT
Start: 2021-01-01 | End: 2021-01-01

## 2021-01-01 RX ORDER — HYDROCODONE BITARTRATE AND ACETAMINOPHEN 5; 325 MG/1; MG/1
1 TABLET ORAL EVERY 6 HOURS PRN
Status: DISCONTINUED | OUTPATIENT
Start: 2021-01-01 | End: 2021-01-01

## 2021-01-01 RX ORDER — M-VIT,TX,IRON,MINS/CALC/FOLIC 27MG-0.4MG
1 TABLET ORAL DAILY
COMMUNITY

## 2021-01-01 RX ORDER — FENTANYL CITRATE 50 UG/ML
INJECTION, SOLUTION INTRAMUSCULAR; INTRAVENOUS
Status: DISPENSED
Start: 2021-01-01 | End: 2021-01-01

## 2021-01-01 RX ORDER — GINSENG 100 MG
CAPSULE ORAL PRN
Status: DISCONTINUED | OUTPATIENT
Start: 2021-01-01 | End: 2021-01-01 | Stop reason: ALTCHOICE

## 2021-01-01 RX ORDER — NITROGLYCERIN 0.4 MG/1
0.4 TABLET SUBLINGUAL EVERY 5 MIN PRN
Status: COMPLETED | OUTPATIENT
Start: 2021-01-01 | End: 2021-01-01

## 2021-01-01 RX ORDER — MIDAZOLAM HYDROCHLORIDE 1 MG/ML
INJECTION INTRAMUSCULAR; INTRAVENOUS PRN
Status: DISCONTINUED | OUTPATIENT
Start: 2021-01-01 | End: 2021-01-01 | Stop reason: ALTCHOICE

## 2021-01-01 RX ORDER — 3% SODIUM CHLORIDE 3 G/100ML
50 INJECTION, SOLUTION INTRAVENOUS CONTINUOUS
Status: DISCONTINUED | OUTPATIENT
Start: 2021-01-01 | End: 2021-01-01

## 2021-01-01 RX ORDER — DESMOPRESSIN ACETATE 4 UG/ML
2 INJECTION, SOLUTION INTRAVENOUS; SUBCUTANEOUS ONCE
Status: COMPLETED | OUTPATIENT
Start: 2021-01-01 | End: 2021-01-01

## 2021-01-01 RX ORDER — PROPOFOL 10 MG/ML
20 INJECTION, EMULSION INTRAVENOUS ONCE
Status: COMPLETED | OUTPATIENT
Start: 2021-01-01 | End: 2021-01-01

## 2021-01-01 RX ORDER — ONDANSETRON 2 MG/ML
4 INJECTION INTRAMUSCULAR; INTRAVENOUS EVERY 6 HOURS PRN
Status: DISCONTINUED | OUTPATIENT
Start: 2021-01-01 | End: 2021-01-01 | Stop reason: HOSPADM

## 2021-01-01 RX ORDER — KETAMINE HCL IN NACL, ISO-OSM 100MG/10ML
SYRINGE (ML) INJECTION
Status: DISPENSED
Start: 2021-01-01 | End: 2021-01-01

## 2021-01-01 RX ORDER — DEXTROSE MONOHYDRATE 25 G/50ML
12.5 INJECTION, SOLUTION INTRAVENOUS PRN
Status: DISCONTINUED | OUTPATIENT
Start: 2021-01-01 | End: 2021-01-01

## 2021-01-01 RX ORDER — PROMETHAZINE HYDROCHLORIDE 25 MG/ML
25 INJECTION, SOLUTION INTRAMUSCULAR; INTRAVENOUS ONCE
Status: COMPLETED | OUTPATIENT
Start: 2021-01-01 | End: 2021-01-01

## 2021-01-01 RX ORDER — ACETAMINOPHEN 325 MG/1
650 TABLET ORAL EVERY 4 HOURS PRN
Status: DISCONTINUED | OUTPATIENT
Start: 2021-01-01 | End: 2021-01-01 | Stop reason: SDUPTHER

## 2021-01-01 RX ORDER — LOSARTAN POTASSIUM 100 MG/1
100 TABLET ORAL DAILY
Status: DISCONTINUED | OUTPATIENT
Start: 2021-01-01 | End: 2021-01-01

## 2021-01-01 RX ORDER — FENTANYL CITRATE 50 UG/ML
INJECTION, SOLUTION INTRAMUSCULAR; INTRAVENOUS PRN
Status: DISCONTINUED | OUTPATIENT
Start: 2021-01-01 | End: 2021-01-01 | Stop reason: ALTCHOICE

## 2021-01-01 RX ORDER — ACETAMINOPHEN 650 MG/1
650 SUPPOSITORY RECTAL EVERY 4 HOURS PRN
Status: DISCONTINUED | OUTPATIENT
Start: 2021-01-01 | End: 2021-01-01

## 2021-01-01 RX ORDER — SODIUM CHLORIDE 9 MG/ML
INJECTION, SOLUTION INTRAVENOUS CONTINUOUS PRN
Status: DISCONTINUED | OUTPATIENT
Start: 2021-01-01 | End: 2021-01-01 | Stop reason: SDUPTHER

## 2021-01-01 RX ORDER — WARFARIN SODIUM 3 MG/1
6 TABLET ORAL
Status: DISPENSED | OUTPATIENT
Start: 2021-01-01 | End: 2021-01-01

## 2021-01-01 RX ORDER — MIDAZOLAM HYDROCHLORIDE 1 MG/ML
INJECTION INTRAMUSCULAR; INTRAVENOUS
Status: DISPENSED
Start: 2021-01-01 | End: 2021-01-01

## 2021-01-01 RX ORDER — ACETAMINOPHEN 160 MG/5ML
SOLUTION ORAL
Status: COMPLETED
Start: 2021-01-01 | End: 2021-01-01

## 2021-01-01 RX ORDER — GLYCOPYRROLATE 1 MG/5 ML
0.1 SYRINGE (ML) INTRAVENOUS ONCE
Status: COMPLETED | OUTPATIENT
Start: 2021-01-01 | End: 2021-01-01

## 2021-01-01 RX ORDER — LABETALOL 20 MG/4 ML (5 MG/ML) INTRAVENOUS SYRINGE
5 EVERY 10 MIN PRN
Status: DISCONTINUED | OUTPATIENT
Start: 2021-01-01 | End: 2021-01-01 | Stop reason: HOSPADM

## 2021-01-01 RX ORDER — NICOTINE POLACRILEX 4 MG
15 LOZENGE BUCCAL PRN
Status: DISCONTINUED | OUTPATIENT
Start: 2021-01-01 | End: 2021-01-01

## 2021-01-01 RX ORDER — FENTANYL CITRATE 50 UG/ML
25 INJECTION, SOLUTION INTRAMUSCULAR; INTRAVENOUS EVERY 10 MIN PRN
Status: DISCONTINUED | OUTPATIENT
Start: 2021-01-01 | End: 2021-01-01 | Stop reason: HOSPADM

## 2021-01-01 RX ORDER — PROMETHAZINE HYDROCHLORIDE 25 MG/ML
6.25 INJECTION, SOLUTION INTRAMUSCULAR; INTRAVENOUS EVERY 6 HOURS PRN
Status: DISCONTINUED | OUTPATIENT
Start: 2021-01-01 | End: 2021-01-01 | Stop reason: HOSPADM

## 2021-01-01 RX ORDER — PROPOFOL 10 MG/ML
INJECTION, EMULSION INTRAVENOUS
Status: COMPLETED
Start: 2021-01-01 | End: 2021-01-01

## 2021-01-01 RX ORDER — SODIUM CHLORIDE 0.9 % (FLUSH) 0.9 %
10 SYRINGE (ML) INJECTION PRN
Status: DISCONTINUED | OUTPATIENT
Start: 2021-01-01 | End: 2021-01-01 | Stop reason: HOSPADM

## 2021-01-01 RX ORDER — ACETAMINOPHEN 160 MG/5ML
650 SOLUTION ORAL EVERY 4 HOURS PRN
Status: DISCONTINUED | OUTPATIENT
Start: 2021-01-01 | End: 2021-01-01

## 2021-01-01 RX ORDER — CHLORHEXIDINE GLUCONATE 0.12 MG/ML
15 RINSE ORAL 2 TIMES DAILY
Status: DISCONTINUED | OUTPATIENT
Start: 2021-01-01 | End: 2021-01-01

## 2021-01-01 RX ORDER — OLMESARTAN MEDOXOMIL AND HYDROCHLOROTHIAZIDE 40/12.5 40; 12.5 MG/1; MG/1
1 TABLET ORAL DAILY
COMMUNITY

## 2021-01-01 RX ORDER — NAPROXEN SODIUM 220 MG
550 TABLET ORAL PRN
COMMUNITY

## 2021-01-01 RX ORDER — SODIUM CHLORIDE 0.9 % (FLUSH) 0.9 %
10 SYRINGE (ML) INJECTION EVERY 12 HOURS SCHEDULED
Status: DISCONTINUED | OUTPATIENT
Start: 2021-01-01 | End: 2021-01-01 | Stop reason: SDUPTHER

## 2021-01-01 RX ORDER — GLYCOPYRROLATE 1 MG/5 ML
0.2 SYRINGE (ML) INTRAVENOUS EVERY 4 HOURS PRN
Status: DISCONTINUED | OUTPATIENT
Start: 2021-01-01 | End: 2021-01-01

## 2021-01-01 RX ADMIN — LEVETIRACETAM 1000 MG: 100 INJECTION, SOLUTION INTRAVENOUS at 18:53

## 2021-01-01 RX ADMIN — METOPROLOL TARTRATE 25 MG: 25 TABLET ORAL at 21:19

## 2021-01-01 RX ADMIN — ROCURONIUM BROMIDE 50 MG: 10 INJECTION INTRAVENOUS at 10:30

## 2021-01-01 RX ADMIN — FAMOTIDINE 20 MG: 20 TABLET, FILM COATED ORAL at 22:22

## 2021-01-01 RX ADMIN — ATORVASTATIN CALCIUM 40 MG: 40 TABLET, FILM COATED ORAL at 22:23

## 2021-01-01 RX ADMIN — PROPOFOL 50 MCG/KG/MIN: 10 INJECTION, EMULSION INTRAVENOUS at 01:00

## 2021-01-01 RX ADMIN — PIPERACILLIN AND TAZOBACTAM 3.38 G: 3; .375 INJECTION, POWDER, LYOPHILIZED, FOR SOLUTION INTRAVENOUS at 04:33

## 2021-01-01 RX ADMIN — PROPOFOL 20 MG: 10 INJECTION, EMULSION INTRAVENOUS at 02:59

## 2021-01-01 RX ADMIN — Medication 3 G: at 01:30

## 2021-01-01 RX ADMIN — METOPROLOL TARTRATE 25 MG: 25 TABLET ORAL at 20:17

## 2021-01-01 RX ADMIN — NICARDIPINE HYDROCHLORIDE 7.5 MG/HR: 2.5 INJECTION INTRAVENOUS at 12:18

## 2021-01-01 RX ADMIN — PROTAMINE SULFATE 50 MG: 10 INJECTION, SOLUTION INTRAVENOUS at 00:20

## 2021-01-01 RX ADMIN — PHENYLEPHRINE HYDROCHLORIDE 100 MCG: 10 INJECTION INTRAVENOUS at 10:36

## 2021-01-01 RX ADMIN — PROPOFOL 25 MCG/KG/MIN: 10 INJECTION, EMULSION INTRAVENOUS at 22:06

## 2021-01-01 RX ADMIN — INSULIN LISPRO 1 UNITS: 100 INJECTION, SOLUTION INTRAVENOUS; SUBCUTANEOUS at 18:07

## 2021-01-01 RX ADMIN — PHENYLEPHRINE HYDROCHLORIDE 200 MCG: 10 INJECTION INTRAVENOUS at 08:17

## 2021-01-01 RX ADMIN — FAMOTIDINE 20 MG: 20 TABLET, FILM COATED ORAL at 08:31

## 2021-01-01 RX ADMIN — PROPOFOL 40 MCG/KG/MIN: 10 INJECTION, EMULSION INTRAVENOUS at 05:47

## 2021-01-01 RX ADMIN — ALBUMIN (HUMAN) 500 ML: 12.5 SOLUTION INTRAVENOUS at 09:00

## 2021-01-01 RX ADMIN — NICARDIPINE HYDROCHLORIDE 3.5 MG/HR: 2.5 INJECTION INTRAVENOUS at 23:03

## 2021-01-01 RX ADMIN — NICARDIPINE HYDROCHLORIDE 15 MG/HR: 2.5 INJECTION INTRAVENOUS at 20:11

## 2021-01-01 RX ADMIN — SODIUM CHLORIDE, PRESERVATIVE FREE 10 ML: 5 INJECTION INTRAVENOUS at 20:10

## 2021-01-01 RX ADMIN — NICARDIPINE HYDROCHLORIDE 15 MG/HR: 2.5 INJECTION INTRAVENOUS at 18:24

## 2021-01-01 RX ADMIN — Medication 15 ML: at 16:22

## 2021-01-01 RX ADMIN — ETOMIDATE 20 MG: 2 INJECTION INTRAVENOUS at 02:55

## 2021-01-01 RX ADMIN — ACETAMINOPHEN 650 MG: 650 SUPPOSITORY RECTAL at 19:47

## 2021-01-01 RX ADMIN — SODIUM CHLORIDE 0.4 MCG/KG/HR: 9 INJECTION, SOLUTION INTRAVENOUS at 09:58

## 2021-01-01 RX ADMIN — ONDANSETRON 4 MG: 2 INJECTION INTRAMUSCULAR; INTRAVENOUS at 00:12

## 2021-01-01 RX ADMIN — NICARDIPINE HYDROCHLORIDE 13 MG/HR: 2.5 INJECTION INTRAVENOUS at 22:19

## 2021-01-01 RX ADMIN — PROPOFOL 50 MCG/KG/MIN: 10 INJECTION, EMULSION INTRAVENOUS at 06:33

## 2021-01-01 RX ADMIN — FENTANYL CITRATE 50 MCG: 50 INJECTION, SOLUTION INTRAMUSCULAR; INTRAVENOUS at 13:48

## 2021-01-01 RX ADMIN — FAMOTIDINE 20 MG: 20 TABLET, FILM COATED ORAL at 20:10

## 2021-01-01 RX ADMIN — MANNITOL 50 G: 20 INJECTION, SOLUTION INTRAVENOUS at 16:02

## 2021-01-01 RX ADMIN — NICARDIPINE HYDROCHLORIDE 7.5 MG/HR: 2.5 INJECTION INTRAVENOUS at 04:30

## 2021-01-01 RX ADMIN — INSULIN LISPRO 1 UNITS: 100 INJECTION, SOLUTION INTRAVENOUS; SUBCUTANEOUS at 07:02

## 2021-01-01 RX ADMIN — NICARDIPINE HYDROCHLORIDE 12.5 MG/HR: 2.5 INJECTION INTRAVENOUS at 21:20

## 2021-01-01 RX ADMIN — HYDRALAZINE HYDROCHLORIDE 10 MG: 20 INJECTION INTRAMUSCULAR; INTRAVENOUS at 00:31

## 2021-01-01 RX ADMIN — PROPOFOL 40 MCG/KG/MIN: 10 INJECTION, EMULSION INTRAVENOUS at 07:28

## 2021-01-01 RX ADMIN — LORAZEPAM 1 MG: 2 INJECTION INTRAMUSCULAR; INTRAVENOUS at 13:43

## 2021-01-01 RX ADMIN — Medication 3 G: at 08:14

## 2021-01-01 RX ADMIN — Medication 10 ML: at 09:26

## 2021-01-01 RX ADMIN — Medication 15 ML: at 09:15

## 2021-01-01 RX ADMIN — FAMOTIDINE 20 MG: 20 TABLET, FILM COATED ORAL at 20:11

## 2021-01-01 RX ADMIN — FENTANYL CITRATE 50 MCG: 50 INJECTION, SOLUTION INTRAMUSCULAR; INTRAVENOUS at 05:00

## 2021-01-01 RX ADMIN — MANNITOL 50 G: 20 INJECTION, SOLUTION INTRAVENOUS at 17:07

## 2021-01-01 RX ADMIN — LEVETIRACETAM 1000 MG: 100 INJECTION, SOLUTION INTRAVENOUS at 19:49

## 2021-01-01 RX ADMIN — MANNITOL 50 G: 20 INJECTION, SOLUTION INTRAVENOUS at 09:13

## 2021-01-01 RX ADMIN — SODIUM CHLORIDE 20 ML/HR: 3 INJECTION, SOLUTION INTRAVENOUS at 13:14

## 2021-01-01 RX ADMIN — SODIUM CHLORIDE 25 ML/HR: 3 INJECTION, SOLUTION INTRAVENOUS at 01:49

## 2021-01-01 RX ADMIN — PHENYLEPHRINE HYDROCHLORIDE 200 MCG: 10 INJECTION INTRAVENOUS at 08:02

## 2021-01-01 RX ADMIN — SODIUM CHLORIDE, PRESERVATIVE FREE 10 ML: 5 INJECTION INTRAVENOUS at 21:11

## 2021-01-01 RX ADMIN — MANNITOL 50 G: 20 INJECTION, SOLUTION INTRAVENOUS at 00:43

## 2021-01-01 RX ADMIN — SODIUM CHLORIDE, PRESERVATIVE FREE 10 ML: 5 INJECTION INTRAVENOUS at 07:44

## 2021-01-01 RX ADMIN — PHENYLEPHRINE HYDROCHLORIDE 200 MCG: 10 INJECTION INTRAVENOUS at 07:55

## 2021-01-01 RX ADMIN — Medication 10 ML: at 08:15

## 2021-01-01 RX ADMIN — Medication 3 G: at 16:32

## 2021-01-01 RX ADMIN — METOPROLOL TARTRATE 25 MG: 25 TABLET ORAL at 09:14

## 2021-01-01 RX ADMIN — INSULIN LISPRO 1 UNITS: 100 INJECTION, SOLUTION INTRAVENOUS; SUBCUTANEOUS at 23:37

## 2021-01-01 RX ADMIN — NICARDIPINE HYDROCHLORIDE 15 MG/HR: 2.5 INJECTION INTRAVENOUS at 14:58

## 2021-01-01 RX ADMIN — SODIUM CHLORIDE 20 ML/HR: 3 INJECTION, SOLUTION INTRAVENOUS at 12:52

## 2021-01-01 RX ADMIN — PROMETHAZINE HYDROCHLORIDE 25 MG: 25 INJECTION, SOLUTION INTRAMUSCULAR; INTRAVENOUS at 00:48

## 2021-01-01 RX ADMIN — SODIUM CHLORIDE 25 ML/HR: 3 INJECTION, SOLUTION INTRAVENOUS at 22:18

## 2021-01-01 RX ADMIN — ATORVASTATIN CALCIUM 40 MG: 40 TABLET, FILM COATED ORAL at 23:02

## 2021-01-01 RX ADMIN — NITROGLYCERIN 0.4 MG: 0.4 TABLET, ORALLY DISINTEGRATING SUBLINGUAL at 03:06

## 2021-01-01 RX ADMIN — METOPROLOL TARTRATE 25 MG: 25 TABLET ORAL at 09:27

## 2021-01-01 RX ADMIN — Medication 15 ML: at 20:15

## 2021-01-01 RX ADMIN — FENTANYL CITRATE 25 MCG: 50 INJECTION, SOLUTION INTRAMUSCULAR; INTRAVENOUS at 14:25

## 2021-01-01 RX ADMIN — NICARDIPINE HYDROCHLORIDE 10 MG/HR: 2.5 INJECTION INTRAVENOUS at 10:12

## 2021-01-01 RX ADMIN — METOPROLOL TARTRATE 25 MG: 25 TABLET ORAL at 07:43

## 2021-01-01 RX ADMIN — FENTANYL CITRATE 50 MCG: 50 INJECTION, SOLUTION INTRAMUSCULAR; INTRAVENOUS at 15:01

## 2021-01-01 RX ADMIN — METOPROLOL TARTRATE 25 MG: 25 TABLET ORAL at 22:22

## 2021-01-01 RX ADMIN — ASPIRIN 81 MG: 81 TABLET, CHEWABLE ORAL at 12:01

## 2021-01-01 RX ADMIN — PROPOFOL 40 MCG/KG/MIN: 10 INJECTION, EMULSION INTRAVENOUS at 20:21

## 2021-01-01 RX ADMIN — Medication 3 G: at 02:35

## 2021-01-01 RX ADMIN — SODIUM CHLORIDE: 9 INJECTION, SOLUTION INTRAVENOUS at 08:49

## 2021-01-01 RX ADMIN — SODIUM CHLORIDE, PRESERVATIVE FREE 10 ML: 5 INJECTION INTRAVENOUS at 10:38

## 2021-01-01 RX ADMIN — NICARDIPINE HYDROCHLORIDE 12.5 MG/HR: 2.5 INJECTION INTRAVENOUS at 02:42

## 2021-01-01 RX ADMIN — PROPOFOL 25 MCG/KG/MIN: 10 INJECTION, EMULSION INTRAVENOUS at 03:11

## 2021-01-01 RX ADMIN — NICARDIPINE HYDROCHLORIDE 7.5 MG/HR: 2.5 INJECTION INTRAVENOUS at 20:38

## 2021-01-01 RX ADMIN — NICARDIPINE HYDROCHLORIDE 15 MG/HR: 2.5 INJECTION INTRAVENOUS at 06:34

## 2021-01-01 RX ADMIN — Medication 3 G: at 16:37

## 2021-01-01 RX ADMIN — HYDRALAZINE HYDROCHLORIDE 10 MG: 20 INJECTION INTRAMUSCULAR; INTRAVENOUS at 03:57

## 2021-01-01 RX ADMIN — MANNITOL 50 G: 20 INJECTION, SOLUTION INTRAVENOUS at 09:02

## 2021-01-01 RX ADMIN — Medication 3 G: at 09:04

## 2021-01-01 RX ADMIN — FAMOTIDINE 20 MG: 20 TABLET, FILM COATED ORAL at 09:15

## 2021-01-01 RX ADMIN — NICARDIPINE HYDROCHLORIDE 5.5 MG/HR: 2.5 INJECTION INTRAVENOUS at 06:07

## 2021-01-01 RX ADMIN — POTASSIUM CHLORIDE 20 MEQ: 29.8 INJECTION, SOLUTION INTRAVENOUS at 13:59

## 2021-01-01 RX ADMIN — CEFAZOLIN 2000 MG: 1 INJECTION, POWDER, FOR SOLUTION INTRAMUSCULAR; INTRAVENOUS; PARENTERAL at 10:26

## 2021-01-01 RX ADMIN — NICARDIPINE HYDROCHLORIDE 11 MG/HR: 2.5 INJECTION INTRAVENOUS at 00:46

## 2021-01-01 RX ADMIN — Medication 15 ML: at 07:43

## 2021-01-01 RX ADMIN — ATORVASTATIN CALCIUM 40 MG: 40 TABLET, FILM COATED ORAL at 23:19

## 2021-01-01 RX ADMIN — PROPOFOL 50 MCG/KG/MIN: 10 INJECTION, EMULSION INTRAVENOUS at 22:14

## 2021-01-01 RX ADMIN — FENTANYL CITRATE 50 MCG: 50 INJECTION, SOLUTION INTRAMUSCULAR; INTRAVENOUS at 23:03

## 2021-01-01 RX ADMIN — Medication 3 G: at 02:15

## 2021-01-01 RX ADMIN — HYDROMORPHONE HYDROCHLORIDE 0.5 MG: 1 INJECTION, SOLUTION INTRAMUSCULAR; INTRAVENOUS; SUBCUTANEOUS at 15:47

## 2021-01-01 RX ADMIN — FENTANYL CITRATE 25 MCG: 50 INJECTION, SOLUTION INTRAMUSCULAR; INTRAVENOUS at 13:53

## 2021-01-01 RX ADMIN — NICARDIPINE HYDROCHLORIDE 15 MG/HR: 2.5 INJECTION INTRAVENOUS at 16:38

## 2021-01-01 RX ADMIN — PROPOFOL 40 MCG/KG/MIN: 10 INJECTION, EMULSION INTRAVENOUS at 06:36

## 2021-01-01 RX ADMIN — SODIUM CHLORIDE, PRESERVATIVE FREE 10 ML: 5 INJECTION INTRAVENOUS at 08:51

## 2021-01-01 RX ADMIN — SODIUM CHLORIDE: 9 INJECTION, SOLUTION INTRAVENOUS at 10:31

## 2021-01-01 RX ADMIN — Medication 3 G: at 09:09

## 2021-01-01 RX ADMIN — Medication 25 MCG/HR: at 19:20

## 2021-01-01 RX ADMIN — INSULIN LISPRO 1 UNITS: 100 INJECTION, SOLUTION INTRAVENOUS; SUBCUTANEOUS at 12:59

## 2021-01-01 RX ADMIN — Medication 10 ML: at 09:15

## 2021-01-01 RX ADMIN — IOPAMIDOL 85 ML: 755 INJECTION, SOLUTION INTRAVENOUS at 04:20

## 2021-01-01 RX ADMIN — PHENYLEPHRINE HYDROCHLORIDE 100 MCG: 10 INJECTION INTRAVENOUS at 10:00

## 2021-01-01 RX ADMIN — FAMOTIDINE 20 MG: 20 TABLET, FILM COATED ORAL at 20:25

## 2021-01-01 RX ADMIN — PROPOFOL 40 MCG/KG/MIN: 10 INJECTION, EMULSION INTRAVENOUS at 09:56

## 2021-01-01 RX ADMIN — ALTEPLASE 1 MG: 2.2 INJECTION, POWDER, LYOPHILIZED, FOR SOLUTION INTRAVENOUS at 10:55

## 2021-01-01 RX ADMIN — LEVETIRACETAM 1000 MG: 100 INJECTION, SOLUTION INTRAVENOUS at 19:38

## 2021-01-01 RX ADMIN — PROPOFOL 40 MCG/KG/MIN: 10 INJECTION, EMULSION INTRAVENOUS at 14:09

## 2021-01-01 RX ADMIN — Medication 10 MG: at 08:23

## 2021-01-01 RX ADMIN — Medication 15 ML: at 20:10

## 2021-01-01 RX ADMIN — PROMETHAZINE HYDROCHLORIDE 25 MG: 25 INJECTION INTRAMUSCULAR; INTRAVENOUS at 00:48

## 2021-01-01 RX ADMIN — NICARDIPINE HYDROCHLORIDE 2.5 MG/HR: 2.5 INJECTION INTRAVENOUS at 17:49

## 2021-01-01 RX ADMIN — NICARDIPINE HYDROCHLORIDE 7.5 MG/HR: 2.5 INJECTION INTRAVENOUS at 16:44

## 2021-01-01 RX ADMIN — Medication 25 MCG/HR: at 14:34

## 2021-01-01 RX ADMIN — MANNITOL 50 G: 20 INJECTION, SOLUTION INTRAVENOUS at 22:26

## 2021-01-01 RX ADMIN — PROPOFOL 40 MCG/KG/MIN: 10 INJECTION, EMULSION INTRAVENOUS at 00:07

## 2021-01-01 RX ADMIN — PROPOFOL 40 MCG/KG/MIN: 10 INJECTION, EMULSION INTRAVENOUS at 03:36

## 2021-01-01 RX ADMIN — Medication 15 ML: at 08:30

## 2021-01-01 RX ADMIN — Medication 3 G: at 09:26

## 2021-01-01 RX ADMIN — METOPROLOL TARTRATE 25 MG: 25 TABLET ORAL at 12:02

## 2021-01-01 RX ADMIN — MANNITOL 50 G: 20 INJECTION, SOLUTION INTRAVENOUS at 17:04

## 2021-01-01 RX ADMIN — ASPIRIN 81 MG: 81 TABLET, CHEWABLE ORAL at 08:49

## 2021-01-01 RX ADMIN — Medication 3 G: at 18:47

## 2021-01-01 RX ADMIN — CALCIUM GLUCONATE 2 G: 98 INJECTION, SOLUTION INTRAVENOUS at 22:02

## 2021-01-01 RX ADMIN — SODIUM CHLORIDE, PRESERVATIVE FREE 10 ML: 5 INJECTION INTRAVENOUS at 10:04

## 2021-01-01 RX ADMIN — PIPERACILLIN AND TAZOBACTAM 3.38 G: 3; .375 INJECTION, POWDER, LYOPHILIZED, FOR SOLUTION INTRAVENOUS at 23:10

## 2021-01-01 RX ADMIN — ENOXAPARIN SODIUM 120 MG: 120 INJECTION SUBCUTANEOUS at 21:11

## 2021-01-01 RX ADMIN — INSULIN LISPRO 1 UNITS: 100 INJECTION, SOLUTION INTRAVENOUS; SUBCUTANEOUS at 23:03

## 2021-01-01 RX ADMIN — SODIUM CHLORIDE: 9 INJECTION, SOLUTION INTRAVENOUS at 06:34

## 2021-01-01 RX ADMIN — SODIUM CHLORIDE 1000 ML: 9 INJECTION, SOLUTION INTRAVENOUS at 02:05

## 2021-01-01 RX ADMIN — FAMOTIDINE 20 MG: 20 TABLET, FILM COATED ORAL at 09:45

## 2021-01-01 RX ADMIN — Medication 10 MG: at 08:30

## 2021-01-01 RX ADMIN — PHENYLEPHRINE HYDROCHLORIDE 200 MCG: 10 INJECTION INTRAVENOUS at 10:44

## 2021-01-01 RX ADMIN — FENTANYL CITRATE 50 MCG: 50 INJECTION, SOLUTION INTRAMUSCULAR; INTRAVENOUS at 05:13

## 2021-01-01 RX ADMIN — Medication 3 G: at 09:15

## 2021-01-01 RX ADMIN — MANNITOL 50 G: 12.5 INJECTION, SOLUTION INTRAVENOUS at 14:54

## 2021-01-01 RX ADMIN — NICARDIPINE HYDROCHLORIDE 7 MG/HR: 2.5 INJECTION INTRAVENOUS at 00:32

## 2021-01-01 RX ADMIN — PHENYLEPHRINE HYDROCHLORIDE 100 MCG: 10 INJECTION INTRAVENOUS at 09:51

## 2021-01-01 RX ADMIN — FAMOTIDINE 20 MG: 20 TABLET, FILM COATED ORAL at 23:02

## 2021-01-01 RX ADMIN — LEVETIRACETAM 1000 MG: 100 INJECTION, SOLUTION INTRAVENOUS at 07:32

## 2021-01-01 RX ADMIN — LEVETIRACETAM 1000 MG: 100 INJECTION, SOLUTION INTRAVENOUS at 06:17

## 2021-01-01 RX ADMIN — Medication 15 ML: at 22:22

## 2021-01-01 RX ADMIN — NITROGLYCERIN 0.4 MG: 0.4 TABLET, ORALLY DISINTEGRATING SUBLINGUAL at 12:09

## 2021-01-01 RX ADMIN — PROPOFOL 50 MCG/KG/MIN: 10 INJECTION, EMULSION INTRAVENOUS at 04:02

## 2021-01-01 RX ADMIN — NICARDIPINE HYDROCHLORIDE 7.5 MG/HR: 2.5 INJECTION INTRAVENOUS at 08:50

## 2021-01-01 RX ADMIN — SODIUM CHLORIDE, PRESERVATIVE FREE 10 ML: 5 INJECTION INTRAVENOUS at 20:18

## 2021-01-01 RX ADMIN — METOPROLOL TARTRATE 25 MG: 25 TABLET ORAL at 23:18

## 2021-01-01 RX ADMIN — PROPOFOL 1000 MG: 10 INJECTION, EMULSION INTRAVENOUS at 03:00

## 2021-01-01 RX ADMIN — NICARDIPINE HYDROCHLORIDE 5 MG/HR: 2.5 INJECTION INTRAVENOUS at 14:05

## 2021-01-01 RX ADMIN — LOSARTAN POTASSIUM 100 MG: 100 TABLET, FILM COATED ORAL at 17:00

## 2021-01-01 RX ADMIN — MANNITOL 50 G: 20 INJECTION, SOLUTION INTRAVENOUS at 07:39

## 2021-01-01 RX ADMIN — POTASSIUM CHLORIDE 20 MEQ: 29.8 INJECTION, SOLUTION INTRAVENOUS at 22:57

## 2021-01-01 RX ADMIN — ACETAMINOPHEN 650 MG: 160 SOLUTION ORAL at 18:11

## 2021-01-01 RX ADMIN — Medication 34.8 MILLICURIE: at 09:05

## 2021-01-01 RX ADMIN — LEVETIRACETAM 1000 MG: 100 INJECTION, SOLUTION INTRAVENOUS at 18:46

## 2021-01-01 RX ADMIN — PROPOFOL 40 MCG/KG/MIN: 10 INJECTION, EMULSION INTRAVENOUS at 02:20

## 2021-01-01 RX ADMIN — PROPOFOL 50 MCG/KG/MIN: 10 INJECTION, EMULSION INTRAVENOUS at 19:13

## 2021-01-01 RX ADMIN — PHENYLEPHRINE HYDROCHLORIDE 200 MCG: 10 INJECTION INTRAVENOUS at 08:07

## 2021-01-01 RX ADMIN — MANNITOL 50 G: 20 INJECTION, SOLUTION INTRAVENOUS at 06:13

## 2021-01-01 RX ADMIN — SODIUM CHLORIDE: 9 INJECTION, SOLUTION INTRAVENOUS at 16:32

## 2021-01-01 RX ADMIN — FENTANYL CITRATE 25 MCG: 50 INJECTION, SOLUTION INTRAMUSCULAR; INTRAVENOUS at 13:43

## 2021-01-01 RX ADMIN — ROCURONIUM BROMIDE 50 MG: 10 INJECTION INTRAVENOUS at 09:13

## 2021-01-01 RX ADMIN — MANNITOL 50 G: 20 INJECTION, SOLUTION INTRAVENOUS at 16:21

## 2021-01-01 RX ADMIN — PROPOFOL 40 MCG/KG/MIN: 10 INJECTION, EMULSION INTRAVENOUS at 21:20

## 2021-01-01 RX ADMIN — CALCIUM GLUCONATE 2 G: 98 INJECTION, SOLUTION INTRAVENOUS at 13:56

## 2021-01-01 RX ADMIN — HYDROMORPHONE HYDROCHLORIDE 0.5 MG: 1 INJECTION, SOLUTION INTRAMUSCULAR; INTRAVENOUS; SUBCUTANEOUS at 05:32

## 2021-01-01 RX ADMIN — LEVETIRACETAM 1000 MG: 100 INJECTION, SOLUTION INTRAVENOUS at 20:44

## 2021-01-01 RX ADMIN — ROCURONIUM BROMIDE 50 MG: 10 INJECTION INTRAVENOUS at 07:00

## 2021-01-01 RX ADMIN — SODIUM CHLORIDE, PRESERVATIVE FREE 10 ML: 5 INJECTION INTRAVENOUS at 09:15

## 2021-01-01 RX ADMIN — PROPOFOL 40 MCG/KG/MIN: 10 INJECTION, EMULSION INTRAVENOUS at 23:20

## 2021-01-01 RX ADMIN — Medication 0.1 MG: at 13:58

## 2021-01-01 RX ADMIN — ASPIRIN 81 MG: 81 TABLET, CHEWABLE ORAL at 10:37

## 2021-01-01 RX ADMIN — SODIUM CHLORIDE, PRESERVATIVE FREE 10 ML: 5 INJECTION INTRAVENOUS at 08:15

## 2021-01-01 RX ADMIN — ATORVASTATIN CALCIUM 40 MG: 40 TABLET, FILM COATED ORAL at 20:11

## 2021-01-01 RX ADMIN — FENTANYL CITRATE 50 MCG: 50 INJECTION, SOLUTION INTRAMUSCULAR; INTRAVENOUS at 02:59

## 2021-01-01 RX ADMIN — FAMOTIDINE 20 MG: 20 TABLET, FILM COATED ORAL at 11:09

## 2021-01-01 RX ADMIN — PHENYLEPHRINE HYDROCHLORIDE 200 MCG: 10 INJECTION INTRAVENOUS at 08:12

## 2021-01-01 RX ADMIN — PROPOFOL 20 MCG/KG/MIN: 10 INJECTION, EMULSION INTRAVENOUS at 12:45

## 2021-01-01 RX ADMIN — METOPROLOL TARTRATE 25 MG: 25 TABLET ORAL at 09:15

## 2021-01-01 RX ADMIN — METOPROLOL TARTRATE 25 MG: 25 TABLET ORAL at 08:50

## 2021-01-01 RX ADMIN — NICARDIPINE HYDROCHLORIDE 6 MG/HR: 2.5 INJECTION INTRAVENOUS at 02:21

## 2021-01-01 RX ADMIN — METOPROLOL TARTRATE 25 MG: 25 TABLET ORAL at 23:03

## 2021-01-01 RX ADMIN — Medication 3 G: at 00:30

## 2021-01-01 RX ADMIN — NICARDIPINE HYDROCHLORIDE 5 MG/HR: 2.5 INJECTION INTRAVENOUS at 05:35

## 2021-01-01 RX ADMIN — IOPAMIDOL 80 ML: 755 INJECTION, SOLUTION INTRAVENOUS at 23:58

## 2021-01-01 RX ADMIN — ACETAMINOPHEN 650 MG: 650 SUPPOSITORY RECTAL at 05:15

## 2021-01-01 RX ADMIN — NICARDIPINE HYDROCHLORIDE 15 MG/HR: 2.5 INJECTION INTRAVENOUS at 04:45

## 2021-01-01 RX ADMIN — LEVETIRACETAM 1000 MG: 100 INJECTION, SOLUTION INTRAVENOUS at 20:05

## 2021-01-01 RX ADMIN — NICARDIPINE HYDROCHLORIDE 5 MG/HR: 2.5 INJECTION INTRAVENOUS at 17:32

## 2021-01-01 RX ADMIN — NITROGLYCERIN 0.4 MG: 0.4 TABLET, ORALLY DISINTEGRATING SUBLINGUAL at 02:57

## 2021-01-01 RX ADMIN — Medication 3 G: at 18:49

## 2021-01-01 RX ADMIN — LEVETIRACETAM 1000 MG: 100 INJECTION, SOLUTION INTRAVENOUS at 07:02

## 2021-01-01 RX ADMIN — PROPOFOL 10 MCG/KG/MIN: 10 INJECTION, EMULSION INTRAVENOUS at 16:06

## 2021-01-01 RX ADMIN — PROPOFOL 40 MCG/KG/MIN: 10 INJECTION, EMULSION INTRAVENOUS at 13:47

## 2021-01-01 RX ADMIN — LORAZEPAM 1 MG: 2 INJECTION INTRAMUSCULAR; INTRAVENOUS at 14:00

## 2021-01-01 RX ADMIN — Medication 10.1 MILLICURIE: at 08:15

## 2021-01-01 RX ADMIN — ATORVASTATIN CALCIUM 40 MG: 40 TABLET, FILM COATED ORAL at 20:10

## 2021-01-01 RX ADMIN — IOPAMIDOL 140 ML: 755 INJECTION, SOLUTION INTRAVENOUS at 12:04

## 2021-01-01 RX ADMIN — MANNITOL 50 G: 20 INJECTION, SOLUTION INTRAVENOUS at 23:26

## 2021-01-01 RX ADMIN — PHENYLEPHRINE HYDROCHLORIDE 100 MCG: 10 INJECTION INTRAVENOUS at 10:08

## 2021-01-01 RX ADMIN — POTASSIUM CHLORIDE 20 MEQ: 29.8 INJECTION, SOLUTION INTRAVENOUS at 23:42

## 2021-01-01 RX ADMIN — LEVETIRACETAM 1000 MG: 100 INJECTION, SOLUTION INTRAVENOUS at 06:34

## 2021-01-01 RX ADMIN — SODIUM CHLORIDE, PRESERVATIVE FREE 10 ML: 5 INJECTION INTRAVENOUS at 09:27

## 2021-01-01 RX ADMIN — ROCURONIUM BROMIDE 50 MG: 10 INJECTION INTRAVENOUS at 07:45

## 2021-01-01 RX ADMIN — SODIUM CHLORIDE, PRESERVATIVE FREE 10 ML: 5 INJECTION INTRAVENOUS at 09:20

## 2021-01-01 RX ADMIN — LEVETIRACETAM 1000 MG: 100 INJECTION, SOLUTION INTRAVENOUS at 06:31

## 2021-01-01 RX ADMIN — MIDAZOLAM 1 MG/HR: 5 INJECTION, SOLUTION INTRAMUSCULAR; INTRAVENOUS at 19:28

## 2021-01-01 RX ADMIN — SODIUM CHLORIDE, PRESERVATIVE FREE 10 ML: 5 INJECTION INTRAVENOUS at 08:27

## 2021-01-01 RX ADMIN — LEVETIRACETAM 1000 MG: 100 INJECTION, SOLUTION INTRAVENOUS at 08:11

## 2021-01-01 RX ADMIN — FENTANYL CITRATE 25 MCG: 50 INJECTION, SOLUTION INTRAMUSCULAR; INTRAVENOUS at 14:15

## 2021-01-01 RX ADMIN — Medication 10 ML: at 22:22

## 2021-01-01 RX ADMIN — MANNITOL 50 G: 20 INJECTION, SOLUTION INTRAVENOUS at 00:07

## 2021-01-01 RX ADMIN — ACETAMINOPHEN ORAL SOLUTION 650 MG: 650 SOLUTION ORAL at 18:11

## 2021-01-01 RX ADMIN — SODIUM CHLORIDE 20 ML/HR: 3 INJECTION, SOLUTION INTRAVENOUS at 10:09

## 2021-01-01 RX ADMIN — FENTANYL CITRATE 50 MCG: 50 INJECTION, SOLUTION INTRAMUSCULAR; INTRAVENOUS at 14:05

## 2021-01-01 RX ADMIN — Medication 3 G: at 17:10

## 2021-01-01 RX ADMIN — NICARDIPINE HYDROCHLORIDE 15 MG/HR: 2.5 INJECTION INTRAVENOUS at 08:33

## 2021-01-01 RX ADMIN — ATORVASTATIN CALCIUM 40 MG: 40 TABLET, FILM COATED ORAL at 20:17

## 2021-01-01 RX ADMIN — FAMOTIDINE 20 MG: 20 TABLET, FILM COATED ORAL at 07:43

## 2021-01-01 RX ADMIN — SODIUM CHLORIDE, PRESERVATIVE FREE 10 ML: 5 INJECTION INTRAVENOUS at 22:09

## 2021-01-01 RX ADMIN — FENTANYL CITRATE 100 MCG: 50 INJECTION, SOLUTION INTRAMUSCULAR; INTRAVENOUS at 07:00

## 2021-01-01 RX ADMIN — METOPROLOL TARTRATE 25 MG: 25 TABLET ORAL at 21:11

## 2021-01-01 RX ADMIN — Medication 2 MCG/MIN: at 10:57

## 2021-01-01 RX ADMIN — PROPOFOL 40 MCG/KG/MIN: 10 INJECTION, EMULSION INTRAVENOUS at 17:34

## 2021-01-01 RX ADMIN — INSULIN LISPRO 1 UNITS: 100 INJECTION, SOLUTION INTRAVENOUS; SUBCUTANEOUS at 04:23

## 2021-01-01 RX ADMIN — CEFAZOLIN 2000 MG: 1 INJECTION, POWDER, FOR SOLUTION INTRAMUSCULAR; INTRAVENOUS; PARENTERAL at 06:38

## 2021-01-01 RX ADMIN — PHENYLEPHRINE HYDROCHLORIDE 200 MCG: 10 INJECTION INTRAVENOUS at 08:43

## 2021-01-01 RX ADMIN — Medication 2 MCG/MIN: at 11:50

## 2021-01-01 RX ADMIN — SODIUM CHLORIDE 25 ML/HR: 3 INJECTION, SOLUTION INTRAVENOUS at 18:50

## 2021-01-01 RX ADMIN — ATORVASTATIN CALCIUM 40 MG: 40 TABLET, FILM COATED ORAL at 21:11

## 2021-01-01 RX ADMIN — NICARDIPINE HYDROCHLORIDE 10 MG/HR: 2.5 INJECTION INTRAVENOUS at 23:19

## 2021-01-01 RX ADMIN — DESMOPRESSIN ACETATE 2 MCG: 4 SOLUTION INTRAVENOUS at 00:29

## 2021-01-01 RX ADMIN — METOPROLOL TARTRATE 25 MG: 25 TABLET ORAL at 08:31

## 2021-01-01 RX ADMIN — NICARDIPINE HYDROCHLORIDE 2 MG/HR: 2.5 INJECTION INTRAVENOUS at 01:09

## 2021-01-01 RX ADMIN — Medication 3 G: at 00:24

## 2021-01-01 RX ADMIN — PROPOFOL 40 MCG/KG/MIN: 10 INJECTION, EMULSION INTRAVENOUS at 10:54

## 2021-01-01 ASSESSMENT — PULMONARY FUNCTION TESTS
PIF_VALUE: 25
PIF_VALUE: 28
PIF_VALUE: 23
PIF_VALUE: 22
PIF_VALUE: 23
PIF_VALUE: 23
PIF_VALUE: 22
PIF_VALUE: 22
PIF_VALUE: 26
PIF_VALUE: 21
PIF_VALUE: 23
PIF_VALUE: 21
PIF_VALUE: 22
PIF_VALUE: 21
PIF_VALUE: 27
PIF_VALUE: 22
PIF_VALUE: 21
PIF_VALUE: 22
PIF_VALUE: 23
PIF_VALUE: 22
PIF_VALUE: 22
PIF_VALUE: 23
PIF_VALUE: 22
PIF_VALUE: 22
PIF_VALUE: 27
PIF_VALUE: 21
PIF_VALUE: 27
PIF_VALUE: 29
PIF_VALUE: 29
PIF_VALUE: 22
PIF_VALUE: 23
PIF_VALUE: 22
PIF_VALUE: 21
PIF_VALUE: 22
PIF_VALUE: 23
PIF_VALUE: 22
PIF_VALUE: 23
PIF_VALUE: 22
PIF_VALUE: 21
PIF_VALUE: 22
PIF_VALUE: 23
PIF_VALUE: 23
PIF_VALUE: 26
PIF_VALUE: 21
PIF_VALUE: 23
PIF_VALUE: 22
PIF_VALUE: 23
PIF_VALUE: 22
PIF_VALUE: 21
PIF_VALUE: 22
PIF_VALUE: 8
PIF_VALUE: 22
PIF_VALUE: 23
PIF_VALUE: 24
PIF_VALUE: 23
PIF_VALUE: 23
PIF_VALUE: 25
PIF_VALUE: 22
PIF_VALUE: 24
PIF_VALUE: 26
PIF_VALUE: 22
PIF_VALUE: 23
PIF_VALUE: 22
PIF_VALUE: 23
PIF_VALUE: 21
PIF_VALUE: 21
PIF_VALUE: 23
PIF_VALUE: 23
PIF_VALUE: 31
PIF_VALUE: 22
PIF_VALUE: 21
PIF_VALUE: 23
PIF_VALUE: 22
PIF_VALUE: 23
PIF_VALUE: 29
PIF_VALUE: 22
PIF_VALUE: 29
PIF_VALUE: 22
PIF_VALUE: 23
PIF_VALUE: 22
PIF_VALUE: 22
PIF_VALUE: 29
PIF_VALUE: 23
PIF_VALUE: 22
PIF_VALUE: 25
PIF_VALUE: 22
PIF_VALUE: 22
PIF_VALUE: 21
PIF_VALUE: 21
PIF_VALUE: 22
PIF_VALUE: 21
PIF_VALUE: 22
PIF_VALUE: 21
PIF_VALUE: 23
PIF_VALUE: 22
PIF_VALUE: 23
PIF_VALUE: 22
PIF_VALUE: 21
PIF_VALUE: 23
PIF_VALUE: 22
PIF_VALUE: 21
PIF_VALUE: 24
PIF_VALUE: 22
PIF_VALUE: 29
PIF_VALUE: 23
PIF_VALUE: 23
PIF_VALUE: 21
PIF_VALUE: 23
PIF_VALUE: 21
PIF_VALUE: 22
PIF_VALUE: 23
PIF_VALUE: 21
PIF_VALUE: 25
PIF_VALUE: 23
PIF_VALUE: 22
PIF_VALUE: 22
PIF_VALUE: 23
PIF_VALUE: 21
PIF_VALUE: 22
PIF_VALUE: 22
PIF_VALUE: 29
PIF_VALUE: 29
PIF_VALUE: 21
PIF_VALUE: 22
PIF_VALUE: 23
PIF_VALUE: 23
PIF_VALUE: 22
PIF_VALUE: 26
PIF_VALUE: 21
PIF_VALUE: 21
PIF_VALUE: 30
PIF_VALUE: 23
PIF_VALUE: 22
PIF_VALUE: 23
PIF_VALUE: 22
PIF_VALUE: 25
PIF_VALUE: 22
PIF_VALUE: 24
PIF_VALUE: 24
PIF_VALUE: 22
PIF_VALUE: 26
PIF_VALUE: 23
PIF_VALUE: 22
PIF_VALUE: 27
PIF_VALUE: 22
PIF_VALUE: 23
PIF_VALUE: 22

## 2021-01-01 ASSESSMENT — PAIN SCALES - GENERAL
PAINLEVEL_OUTOF10: 6
PAINLEVEL_OUTOF10: 0
PAINLEVEL_OUTOF10: 2
PAINLEVEL_OUTOF10: 0

## 2021-01-01 ASSESSMENT — ENCOUNTER SYMPTOMS
COUGH: 0
RHINORRHEA: 0
VOMITING: 0
NAUSEA: 0
BACK PAIN: 0
SHORTNESS OF BREATH: 1
ABDOMINAL PAIN: 0
CHEST TIGHTNESS: 0
EYE REDNESS: 0

## 2021-01-01 ASSESSMENT — PAIN DESCRIPTION - ORIENTATION: ORIENTATION: LEFT

## 2021-01-01 ASSESSMENT — PAIN - FUNCTIONAL ASSESSMENT: PAIN_FUNCTIONAL_ASSESSMENT: 0-10

## 2021-01-07 PROBLEM — R07.9 CHEST PAIN: Status: ACTIVE | Noted: 2021-01-01

## 2021-01-07 NOTE — ED NOTES
Pt states his chest pain has reduced from a 7/10 to a 3/10.       Inocente Crigler, RN  01/07/21 6439

## 2021-01-07 NOTE — PROGRESS NOTES
Pt admitted to  6K6 from ED. Complaints: Chest pain / discomfort. IV site free of s/s of infection or infiltration. Vital signs obtained. Assessment and data collection initiated. Two nurse skin assessment performed by Yariel GONZALEZ and Bernadette Zamudio RN. Oriented to room. Policies and procedures for 6 explained. Yariel GONZALEZ discussed hourly rounding with patient addressing 5 P's. Fall prevention and safety brochure discussed with patient. Bed alarm on. Call light in reach.   [facility]

## 2021-01-07 NOTE — ED NOTES
Bed: 022A  Expected date: 1/7/21  Expected time: 1:08 AM  Means of arrival: [facility] Dept  Comments:     Nickolas Will RN  01/07/21 1493

## 2021-01-07 NOTE — PROGRESS NOTES
Pharmacy Medication History Note      List of current medications patient is taking is complete. Source of information: Patient    Changes made to medication list:  Medications removed (include reason, ex. therapy complete or physician discontinued):  Metoprolol tartrate - takes succinate    Medications added/doses adjusted:  Metoprolol Succinate 50 mg taken once daily  Warfarin 4 mg taken once daily  Olmesartan/Hydrochlorothiazide 40-12.5 mg taken once daily  Excedrin taken as needed for migraine  Aleve 220 mg - 2 tablets taken as needed  Multivitamin taken once daily    Other notes (ex. Recent course of antibiotics, Coumadin dosing): Warfarin is currently being managed by Dr. Eusebio Hernandez at [facility]  Denies use of other OTC or herbal medications.         Electronically signed by Anselmo Silva on 1/7/2021 at 10:23 AM

## 2021-01-07 NOTE — ED NOTES
ED to inpatient nurses report    Chief Complaint   Patient presents with    Chest Pain      Present to ED from home  LOC: alert and orientated to name, place, date  Vital signs   Vitals:    01/07/21 0205 01/07/21 0319 01/07/21 0425 01/07/21 0620   BP: (!) 149/94 128/89 127/84 136/86   Pulse: 57 64 51 57   Resp: 17 19 14 16   Temp:       TempSrc:       SpO2: 96% 96% 94% 96%   Weight:       Height:          Oxygen Baseline room air     Current needs required room air  Bipap/Cpap No  LDAs:   Peripheral IV 05/22/20 Left Antecubital (Active)   Site Assessment Clean;Dry; Intact 01/07/21 0621   Line Status Normal saline locked 01/07/21 6995   Dressing Status Clean;Dry; Intact 01/07/21 5443     Mobility: Requires assistance * 1  Pending ED orders: none   Present condition: Pt resting in bed with even and unlabored. Pt chest pain relieved with nitro.  Pt vitals are stable  Person of contract, phone number   Our promise was given to patient    Electronically signed by Deysi Yates RN on 1/7/2021 at 7:10 AM       Deysi Yates RN  01/07/21 6757

## 2021-01-07 NOTE — ED NOTES
Pt states he no longer having chest pain after the second nitro.       Inocencio Palacios RN  01/07/21 5739

## 2021-01-07 NOTE — ED NOTES
Pt arrives to the ED for chest pain that woke him up from sleeping.  Pt states he was having 10/10 chest pain when s     Inocencio Palacios RN  01/07/21 0136

## 2021-01-07 NOTE — H&P
History & Physical       Patient: Za Mooney  YOB: 1965    MRN: [id number]     Acct: [de-identified]    PCP: Nalini Dowell DO    Date of Admission: 1/7/2021    Date of Service: Patient seen / examined on 01/07/21 and admitted to outpatient with expected LOS less than two midnights due to medical therapy. ASSESSMENT / PLAN:    Chest pain r/o ACS  HEART Score 4. Relieved with nitroglycerin. Received full-dose ASA en route to [facility]. Hemodynamically appropriate on RA. Admit to med/tele. Trend troponins x 3 / repeat EKG on 3rd draw. Lipid panel, TSH, Mg/Phos. Nitro prn for recurrent symptoms. Continue 81 mg ASA daily. Initiate statin therapy as indicated based on troponin trend, lipid panel results and/or ASCVD score. TTE. Cardiology consulted for additional recommendations / ?stress testing. Sinus bradycardia  Mild (high 50s) / likely iatrogenic secondary to outpatient BB use. BB resumed with hold parameters / may need down-titrated. Maintain telemetry. Macrocytosis without anemia  Etiology unclear. Denies alcohol use. Check B12/folate. Mild hepatocellular transaminitis  Etiology unclear. No associated abdominal pain. Check hepatitis panel. HFP daily. Consider liver US if liver injury worsens/fails to improve. Chronic HTN  Controlled. BB resumed. Monitor BP. ? Hx CVA  Unclear. Patient reports Belkys filter in ?SVC. Not currently on statin therapy. ASA initiated. Lipid panel pending. Remote hx LLE DVT (anticoagulated on coumadin)  See HPI below. Supratherapeutic on arrival (3.14). No active bleeding assessed. PT/INR daily. Pharmacy to dose coumadin. May need transitioned to heparin gtt pending ongoing clinical course. Obesity (BMI 34.7)   on weight loss, healthy diet/exercise. Former tobacco abuse  Quit 2009. Encourage ongoing abstention.       Chief Complaint: chest pain    History of Present Illness:  61 y/o M PMHx HTN, ?CVA, LLE DVT (anticoagulated on Surgical History:    Procedure Laterality Date    CHOLECYSTECTOMY      COLONOSCOPY      ENDOSCOPIC ULTRASOUND (LOWER)      ENDOSCOPY, COLON, DIAGNOSTIC      IVC FILTER INSERTION      NASAL SEPTUM SURGERY        Medications Prior to Admission:   Medication Sig    METOPROLOL TARTRATE PO Take by mouth    warfarin (COUMADIN) 4 MG tablet Take 4 mg by mouth daily    Cholecalciferol (VITAMIN D) 2000 units CAPS capsule Take 2 capsules by mouth daily     Allergies:   Morphine and Neurontin [gabapentin]    Social History:  Socioeconomic History    Marital status:    Tobacco Use    Smoking status: Former Smoker (up to 2 PPD)     Types: Cigarettes     Quit date: 2009    Smokeless tobacco: Never Used   Substance and Sexual Activity    Alcohol use: Rare    Drug use: No     Family History:   Problem Relation Age of Onset    Diabetes Mother     Colon Cancer Father     Heart Disease Father     Heart Attack Father      REVIEW OF SYSTEMS:  A 14-point ROS was obtained and negative, with the exception of pertinent positives as stated in the HPI. PHYSICAL EXAM:  Vitals:    01/07/21 0116 01/07/21 0118 01/07/21 0205 01/07/21 0319   BP:  (!) 149/88 (!) 149/94 128/89   Pulse: 57  57 64   Resp: 20  17 19   Temp:  97.9 °F (36.6 °C)     TempSrc:  Oral     SpO2: 100%  96% 96%   Weight: 270 lb (122.5 kg)      Height: 6' 2\" (1.88 m)        General appearance: Alert / well-appearing  male. Pleasant. Cooperative. NAD. HEENT: Normocephalic / atraumatic. Conjunctivae appear normal.  Neck: Supple. No JVD. Respiratory: Normal respiratory effort on RA. CTAB. No wheezes / rales / rhonchi. Cardiovascular: RRR. Normal S1/S2. No murmurs / rubs / gallops. No reproducible chest wall TTP. Abdomen: Obese. Soft / non-tender / non-distended. Musculoskeletal: No cyanosis or LE edema. Skin: Warm / dry. No pallor / diaphoresis. Neurologic: A/O x 3. No obvious focal neurologic deficits.   Psychiatric: Thought content / judgment / insight appear appropriate. Peripheral pulses: +2 DP/PT bilaterally.     Labs:   Results for orders placed or performed during the hospital encounter of 01/07/21   Protime-INR   Result Value Ref Range    INR 3.14 (H) 0.85 - 1.13   APTT   Result Value Ref Range    aPTT 42.3 (H) 22.0 - 38.0 seconds   CBC auto differential   Result Value Ref Range    WBC 6.3 4.8 - 10.8 thou/mm3    RBC 4.73 4.70 - 6.10 mill/mm3    Hemoglobin 15.3 14.0 - 18.0 gm/dl    Hematocrit 44.7 42.0 - 52.0 %    MCV 94.5 (H) 80.0 - 94.0 fL    MCH 32.3 26.0 - 33.0 pg    MCHC 34.2 32.2 - 35.5 gm/dl    RDW-CV 12.9 11.5 - 14.5 %    RDW-SD 44.6 35.0 - 45.0 fL    Platelets 285 978 - 122 thou/mm3    MPV 10.2 9.4 - 12.4 fL    Seg Neutrophils 38.8 %    Lymphocytes 47.3 %    Monocytes 10.4 %    Eosinophils 2.4 %    Basophils 0.8 %    Immature Granulocytes 0.3 %    Segs Absolute 2.4 1.8 - 7.7 thou/mm3    Lymphocytes Absolute 3.0 1.0 - 4.8 thou/mm3    Monocytes Absolute 0.7 0.4 - 1.3 thou/mm3    Eosinophils Absolute 0.2 0.0 - 0.4 thou/mm3    Basophils Absolute 0.1 0.0 - 0.1 thou/mm3    Immature Grans (Abs) 0.02 0.00 - 0.07 thou/mm3    nRBC 0 /100 wbc   Brain Natriuretic Peptide   Result Value Ref Range    Pro-BNP 35.8 0.0 - 900.0 pg/mL   Troponin   Result Value Ref Range    Troponin T < 0.010 ng/ml   Magnesium   Result Value Ref Range    Magnesium 2.1 1.6 - 2.4 mg/dL   Comprehensive Metabolic Panel   Result Value Ref Range    Glucose 102 70 - 108 mg/dL    CREATININE 1.3 (H) 0.4 - 1.2 mg/dL    BUN 20 7 - 22 mg/dL    Sodium 141 135 - 145 meq/L    Potassium 3.6 3.5 - 5.2 meq/L    Chloride 104 98 - 111 meq/L    CO2 28 23 - 33 meq/L    Calcium 10.1 8.5 - 10.5 mg/dL    AST 51 (H) 5 - 40 U/L    Alkaline Phosphatase 29 (L) 38 - 126 U/L    Total Protein 7.2 6.1 - 8.0 g/dL    Alb 4.3 3.5 - 5.1 g/dL    Total Bilirubin 0.5 0.3 - 1.2 mg/dL    ALT 87 (H) 11 - 66 U/L   Lipase   Result Value Ref Range    Lipase 53.6 (H) 5.6 - 51.3 U/L   Anion Gap   Result Value Ref Range    Anion Gap 9.0 8.0 - 16.0 meq/L   Glomerular Filtration Rate, Estimated   Result Value Ref Range    Est, Glom Filt Rate 57 (A) ml/min/1.73m2   Osmolality   Result Value Ref Range    Osmolality Calc 284.1 275.0 - 300.0 mOsmol/kg     EKG: sinus bradycardia (55) / non-specific ST abnormalities / QT/QTc 444/424    Radiology:     Xr Chest Portable  Result Date: 1/7/2021  Exam: 1V chest Comparison:  CR,SR  - XR CHEST STANDARD (2 VW)  - 07/26/2018 12:38 PM EDT Findings: Mediastinum: No cardiac silhouette enlargement. Lungs:Stable elevated right hemidiaphragm. No infiltrate or mass. Pleura: No pneumothorax or significant effusion. Bones: No acute pathology. Remote bilateral rib fractures. Impression: No acute pathology. This document has been electronically signed by: Jasmin Minor MD on 01/07/2021 02:19 AM     CODE STATUS: FULL    Thank you Eric Dowell DO for the opportunity to be involved in this patient's care.     Electronically signed by Joanne Espinosa MD on 1/7/2021
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
## 2021-01-07 NOTE — PROGRESS NOTES
01/07/21 1207   Provider Notification   Reason for Communication Evaluate  (chest pain )   Provider Name Dr. Tami Carnes   Provider Notification Physician   Method of Communication Secure Message   Response Waiting for response   Physician contacted in regards to active chest pain. Stress test on hold for now. New orders to consult cardiology.

## 2021-01-07 NOTE — ED NOTES
Pt transported to [facility] by cart in stable condition. Pt monitored on telemetry. Called [facility] and informed them that the patient was on their way to the unit.      Leticia Acosta LPN  92/01/31 [id number]

## 2021-01-07 NOTE — ED PROVIDER NOTES
[facility] Emergency Department    CHIEF COMPLAINT       Chief Complaint   Patient presents with    Chest Pain       Nurses Notes reviewed and I agree except as noted in the HPI. HISTORY OF PRESENT ILLNESS    Za Gentleman nathaly 60 y.o. male who presents to the ED for evaluation of midsternal chest pain that radiates to the left jaw and left arm. Started tonight while he was resting. EMS gave 324 ASA. Pain improved. Patient denies N/V/D. Has had intermittent SOB. No fevers. No hx of CAD. Is on coumadin for DVT/PE. HPI was provided by the patient. REVIEW OF SYSTEMS     Review of Systems   Constitutional: Negative for chills, fatigue and fever. HENT: Negative for congestion, ear discharge, ear pain, postnasal drip and rhinorrhea. Eyes: Negative for redness. Respiratory: Positive for shortness of breath. Negative for cough and chest tightness. Cardiovascular: Positive for chest pain and leg swelling. Negative for palpitations. Gastrointestinal: Negative for abdominal pain, nausea and vomiting. Genitourinary: Negative for difficulty urinating, dysuria, enuresis, flank pain and hematuria. Musculoskeletal: Negative for back pain and joint swelling. Skin: Negative for rash. Neurological: Negative for dizziness, light-headedness, numbness and headaches. Psychiatric/Behavioral: Negative for agitation, behavioral problems and confusion. All other systems negative except as noted. PAST MEDICAL HISTORY     Past Medical History:   Diagnosis Date    Cerebral artery occlusion with cerebral infarction ([address]     Deep vein blood clot of left lower extremity ([address]     Headache     Hyperlipidemia     Hypertension     Kidney stone 3/4/2019    Lymphedema of both lower extremities     wears lymphedema boots    Sciatica of left side        SURGICALHISTORY      has a past surgical history that includes Nasal septum surgery; Cholecystectomy;  Endoscopic ultrasonography, GI; IVC filter insertion; Colonoscopy; and Endoscopy, colon, diagnostic. CURRENT MEDICATIONS       Previous Medications    CHOLECALCIFEROL (VITAMIN D) 2000 UNITS CAPS CAPSULE    Take 2 capsules by mouth daily    METOPROLOL TARTRATE PO    Take by mouth    WARFARIN (COUMADIN) 4 MG TABLET    Take 4 mg by mouth daily       ALLERGIES     is allergic to morphine and neurontin [gabapentin]. FAMILY HISTORY     He indicated that his mother is alive. He indicated that his father is . He indicated that his maternal grandmother is . He indicated that his maternal grandfather is . He indicated that his paternal grandmother is . He indicated that his paternal grandfather is . family history includes Colon Cancer in his father; Diabetes in his mother; Heart Attack in his father; Heart Disease in his father.     SOCIAL HISTORY       Social History     Socioeconomic History    Marital status:      Spouse name: Not on file    Number of children: Not on file    Years of education: Not on file    Highest education level: Not on file   Occupational History    Not on file   Social Needs    Financial resource strain: Not on file    Food insecurity     Worry: Not on file     Inability: Not on file    Transportation needs     Medical: Not on file     Non-medical: Not on file   Tobacco Use    Smoking status: Former Smoker     Types: Cigarettes     Quit date: 2007     Years since quittin.4    Smokeless tobacco: Never Used   Substance and Sexual Activity    Alcohol use: Not Currently     Comment: Rare    Drug use: No    Sexual activity: Yes     Partners: Female   Lifestyle    Physical activity     Days per week: Not on file     Minutes per session: Not on file    Stress: Not on file   Relationships    Social connections     Talks on phone: Not on file     Gets together: Not on file     Attends Scientology service: Not on file     Active member of club or organization: Not on file     Attends meetings of clubs or organizations: Not on file     Relationship status: Not on file    Intimate partner violence     Fear of current or ex partner: Not on file     Emotionally abused: Not on file     Physically abused: Not on file     Forced sexual activity: Not on file   Other Topics Concern    Not on file   Social History Narrative    Not on file       PHYSICAL EXAM     INITIAL VITALS:  height is 6' 2\" (1.88 m) and weight is 270 lb (122.5 kg). His oral temperature is 97.9 °F (36.6 °C). His blood pressure is 127/84 and his pulse is 51. His respiration is 14 and oxygen saturation is 94%. Physical Exam  Vitals signs and nursing note reviewed. Constitutional:       General: He is not in acute distress. Appearance: He is well-developed. He is not diaphoretic. HENT:      Head: Normocephalic and atraumatic. Mouth/Throat:      Mouth: Mucous membranes are moist.      Pharynx: Oropharynx is clear. Eyes:      General:         Right eye: No discharge. Left eye: No discharge. Conjunctiva/sclera: Conjunctivae normal.   Neck:      Musculoskeletal: Normal range of motion. Trachea: No tracheal deviation. Cardiovascular:      Rate and Rhythm: Regular rhythm. Bradycardia present. Heart sounds: Normal heart sounds. No murmur. No gallop. Comments: Normal capillary refill  Pulmonary:      Effort: Pulmonary effort is normal. No respiratory distress. Breath sounds: Normal breath sounds. No stridor. Abdominal:      General: Bowel sounds are normal.      Palpations: Abdomen is soft. Musculoskeletal: Normal range of motion. General: No tenderness or deformity. Right lower leg: Edema present. Left lower leg: Edema present. Skin:     General: Skin is warm and dry. Coloration: Skin is not pale. Findings: No erythema or rash. Neurological:      Mental Status: He is alert and oriented to person, place, and time. Cranial Nerves:  No cranial nerve deficit. Psychiatric:         Behavior: Behavior normal.         DIFFERENTIAL DIAGNOSIS:   ACS, CAD, PNA, less likely PE    DIAGNOSTIC RESULTS     EKG: All EKG's are interpreted by the Emergency Department Physician who eithersigns or Co-signs this chart in the absence of a cardiologist.  SB with non-specific ST changes. RADIOLOGY: non-plainfilm images(s) such as CT, Ultrasound and MRI are read by the radiologist.  Plain radiographic images are visualized and preliminarily interpreted by the emergency physician unless otherwise stated below. XR CHEST PORTABLE   Final Result   Impression:   No acute pathology. This document has been electronically signed by:  Tito Grant MD on    01/07/2021 02:19 AM               LABS:   Labs Reviewed   PROTIME-INR - Abnormal; Notable for the following components:       Result Value    INR 3.14 (*)     All other components within normal limits   APTT - Abnormal; Notable for the following components:    aPTT 42.3 (*)     All other components within normal limits   CBC WITH AUTO DIFFERENTIAL - Abnormal; Notable for the following components:    MCV 94.5 (*)     All other components within normal limits   COMPREHENSIVE METABOLIC PANEL - Abnormal; Notable for the following components:    CREATININE 1.3 (*)     AST 51 (*)     Alkaline Phosphatase 29 (*)     ALT 87 (*)     All other components within normal limits   LIPASE - Abnormal; Notable for the following components:    Lipase 53.6 (*)     All other components within normal limits   GLOMERULAR FILTRATION RATE, ESTIMATED - Abnormal; Notable for the following components:    Est, Glom Filt Rate 57 (*)     All other components within normal limits   BRAIN NATRIURETIC PEPTIDE   TROPONIN   MAGNESIUM   ANION GAP   OSMOLALITY   ETHANOL   URINE DRUG SCREEN       EMERGENCY DEPARTMENT COURSE:   Vitals:    Vitals:    01/07/21 0118 01/07/21 0205 01/07/21 0319 01/07/21 0425   BP: (!) 149/88 (!) 149/94 128/89 127/84 Pulse:  57 64 51   Resp:  17 19 14   Temp: 97.9 °F (36.6 °C)      TempSrc: Oral      SpO2:  96% 96% 94%   Weight:       Height:              [facility]    Heart Score for chest pain patients  History: Moderately Suspicious  ECG: Non-Specifc repolarization disturbance/LBTB/PM  Patient Age: > 39 and < 65 years  *Risk factors for Atherosclerotic disease: Hypertension, Obesity, Positive family History, Coronary Artery Disease  Risk Factors: > 3 Risk factors or history of atherosclerotic disease*  Troponin: < 1X normal limit  Heart Score Total: 5                    MDM    Patient was seen in the ER for chest pain and shortness of breath. Appropriate labs and imaging are ordered and reviewed. Pain initially got better with ASA but then returned. Patient was treated with NTG and pain went away. Labs are reassuring. Troponin is negative. EKG is SB with nonspecific ST changes (flattened ST segment). I discussed with Dr. Pa Healy and she agrees to admit for further evaluation. Patient is agreeable. Medications   nitroGLYCERIN (NITROSTAT) SL tablet 0.4 mg (0.4 mg Sublingual Given 1/7/21 0306)   0.9 % sodium chloride bolus (0 mLs Intravenous Stopped 1/7/21 0321)         Patient was seen independently by myself. The patient's final impression and disposition and plan was determined by myself. Strict return precautions and follow up instructions were discussed with the patient prior to discharge, with which the patient agrees. Physical assessment findings, diagnostic testing(s) if applicable, and vital signs reviewed with patient/patient representative. Questions answered. Medications asdirected, including OTC medications for supportive care. Education provided on medications. Differential diagnosis(s) discussed with patient/patient representative. Home care/self care instructions reviewed withpatient/patient representative.   Patient is to follow-up with family care provider in 2-3 days if no improvement. Patient is to go to the emergency department if symptoms worsen. Patient/patient representative isaware of care plan, questions answered, verbalizes understanding and is in agreement. CRITICAL CARE:   None    CONSULTS:  Dr. Cara Huston. PROCEDURES:  None    FINAL IMPRESSION     1. Chest pain, moderate coronary artery risk          DISPOSITION/PLAN   DISPOSITION Admitted 01/07/2021 03:54:09 AM      PATIENT REFERREDTO:  No follow-up provider specified.     DISCHARGE MEDICATIONS:  New Prescriptions    No medications on file       (Please note that portions of this note were completed with a voice recognition program.  Efforts were made to edit the dictations but occasionally words are mis-transcribed.)         JASMYNE Hirsch - JASMYNE Dutton CNP  01/07/21 [id number]

## 2021-01-08 NOTE — CARE COORDINATION
DISASTER CHARTING    1/8/21, 7:23 AM EST    DISCHARGE ONGOING EVALUATION:     DAINA [place] day: 1  Location: -06/006-A Reason for admit: Chest pain [R07.9]   Barriers to Discharge: Trops neg. Telemetry, NSR. Hypertensive, last 142/94. Cardio consulted, DARINEL and stress test ordered. Bilat LE dopplers ordered. PCP: Loly Camara MD  Patient Goals/Plan/Treatment Preferences: Spoke with Aviva Zamora, he plans return home with his wife at discharge. He is independent, denies need for DME or HH. All basic needs are met.
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
## 2021-01-08 NOTE — PROGRESS NOTES
Clinical Pharmacy Note    Kaz Montana is a 60 y.o. male for whom pharmacy has been asked to manage warfarin therapy. Reason for Admission: chest pain    Consulting Physician: Dr. Geroinmo Sousa  Warfarin dose prior to admission: 4 mg daily  Warfarin indication: history of CVA potentially secondary to PFO/LLE DVT  Target INR range: 2-3   Outpatient warfarin provider: Dr. Rodo Garcia at [facility]    Past Medical History:   Diagnosis Date    Cerebral artery occlusion with cerebral infarction [facility])     Deep vein blood clot of left lower extremity ([address]     Headache     Hyperlipidemia     Hypertension     Kidney stone 3/4/2019    Lymphedema of both lower extremities     wears lymphedema boots    Sciatica of left side                 Recent Labs     01/08/21  0330   INR 1.84*     Recent Labs     01/07/21  0120 01/08/21  0330   HGB 15.3 14.2   HCT 44.7 42.8    155       Current warfarin drug-drug interactions: aspirin      Date INR Warfarin Dose   1/8/2021 1.84 6 mg                                   Daily PT/INR until stable within therapeutic range. Thank you for the consult.      Geovanni Rodriguez, PharmD  1/8/2021  2:37 PM

## 2021-01-08 NOTE — PRE SEDATION
[address] [address]  Sedation/Analgesia History & Physical    Pt Name: Rigo Vargas  Account number: [de-identified]  MRN: [id number]  YOB: 1965  Provider Performing Procedure: Bairon Robin MD  Referring Provider: Lore Jimenez MD   Primary Care Physician: Ramiro Bravo MD  Date: 1/8/2021    PRE-PROCEDURE    Code Status: FULL CODE  Brief History/Pre-Procedure Diagnosis:   CVA  DVT  ? PFO     Consent: : I have discussed with the patient risks, benefits, and alternatives (and relevant risks, benefits, and side effects related to alternatives or not receiving care), and likelihood of the success. The patient and/or representative appear to understand and agree to proceed. The discussion encompasses risks, benefits, and side effects related to the alternatives and the risks related to not receiving the proposed care, treatment, and services. The indication, risks and benefits of the procedure and possible therapeutic consequences and alternatives were discussed with the patient. The patient was given the opportunity to ask questions and to have them answered to his/her satisfaction. Risks of the procedure include but are not limited to the following: Bleeding, hematoma including retroperitoneal hemmorhage, infection, pain and discomfort, injury to the aorta and other blood vessels, rhythm disturbance, low blood pressure, myocardial infarction, stroke, kidney damage/failure, myocardial perforation, allergic reactions to sedatives/contrast material, loss of pulse/vascular compromise requiring surgery, aneurysm/pseudoaneurysm formation, possible loss of a limb/hand/leg, needing blood transfusion, requiring emergent open heart surgery or emergent coronary intervention, the need for intubation/respiratory support, the requirement for defibrillation/cardioversion, and death. Alternatives to and omission of the suggested procedure were discussed.  The patient had no further questions and wished to proceed; the consent form was signed. MEDICAL HISTORY  []ASHD/ANGINA/MI/CHF   [x]Hypertension  []Diabetes  [x]Hyperlipidemia  []Smoking  []Family Hx of ASHD  [x]Additional information:       has a past medical history of Cerebral artery occlusion with cerebral infarction [facility]), Deep vein blood clot of left lower extremity ([address] Headache, Hyperlipidemia, Hypertension, Kidney stone, Lymphedema of both lower extremities, and Sciatica of left side. SURGICAL HISTORY   has a past surgical history that includes Nasal septum surgery; Cholecystectomy; Endoscopic ultrasonography, GI; IVC filter insertion; Colonoscopy; and Endoscopy, colon, diagnostic.   Additional information:       ALLERGIES   Allergies as of 01/07/2021 - Review Complete 01/07/2021   Allergen Reaction Noted    Morphine  07/26/2018    Neurontin [gabapentin] Hives 07/26/2018     Additional information:       MEDICATIONS   Aspirin  [x] 81 mg  [] 325 mg  [] None  Antiplatelet drug therapy use last 5 days  [x]No []Yes  Coumadin Use Last 5 Days [x]No [x]Yes  Other anticoagulant use last 5 days  [x]No []Yes    Current Facility-Administered Medications:     influenza quadrivalent split vaccine (FLUZONE;FLUARIX;FLULAVAL;AFLURIA) injection 0.5 mL, 0.5 mL, Intramuscular, Prior to discharge, Anne Arias MD    metoprolol tartrate (LOPRESSOR) tablet 25 mg, 25 mg, Oral, BID, Suzanna Barrow MD, 25 mg at 01/07/21 1202    sodium chloride flush 0.9 % injection 10 mL, 10 mL, Intravenous, 2 times per day, Anne Arias MD, 10 mL at 01/07/21 2209    sodium chloride flush 0.9 % injection 10 mL, 10 mL, Intravenous, PRN, Suzanna Barrow MD    polyethylene glycol (GLYCOLAX) packet 17 g, 17 g, Oral, Daily PRN, Suzanna Barrow MD    acetaminophen (TYLENOL) tablet 650 mg, 650 mg, Oral, Q6H PRN **OR** acetaminophen (TYLENOL) suppository 650 mg, 650 mg, Rectal, Q6H PRN, Suzanna Barrow MD    aspirin chewable tablet 81 mg, 81 mg, Oral, Daily, Suzanna Barrow MD, 81 mg at 01/07/21 1201  Prior to Admission medications    Medication Sig Start Date End Date Taking?  Authorizing Provider   metoprolol succinate (TOPROL XL) 50 MG extended release tablet Take 50 mg by mouth daily   Yes Historical Provider, MD   olmesartan-hydroCHLOROthiazide (BENICAR HCT) 40-12.5 MG per tablet Take 1 tablet by mouth daily   Yes Historical Provider, MD   warfarin (COUMADIN) 4 MG tablet Take 4 mg by mouth daily Managed by Dr Yvonna Brittle   Yes Historical Provider, MD   Aspirin-Acetaminophen-Caffeine (EXCEDRIN PO) Take 1 tablet by mouth as needed    Historical Provider, MD   naproxen sodium (ALEVE) 220 MG tablet Take 550 mg by mouth as needed for Pain    Historical Provider, MD   Multiple Vitamins-Minerals (THERAPEUTIC MULTIVITAMIN-MINERALS) tablet Take 1 tablet by mouth daily    Historical Provider, MD   Cholecalciferol (VITAMIN D) 2000 units CAPS capsule Take 2 capsules by mouth daily    Historical Provider, MD     Additional information:       VITAL SIGNS   Vitals:    01/08/21 0331   BP: (!) 142/94   Pulse: 65   Resp: 16   Temp: 97.8 °F (36.6 °C)   SpO2: 93%       PHYSICAL:   General: No acute distress  HEENT:  Unremarkable for age  Neck: without increased JVD, carotid pulses 2+ bilaterally without bruits  Heart: RRR, S1 & S2 WNL, S4 gallop, without murmurs or rubs   NYHA: 1  Lungs: Clear to auscultation    Abdomen: BS present, without HSM, masses, or tenderness    Extremities: without C,C,E.  Pulses 2+ bilaterally  Mental Status: Alert & Oriented        PLANNED PROCEDURE   []Cath  []PCI                []Pacemaker/AICD  [x]DARINEL             []Cardioversion []Peripheral angiography/PTA  []Other:      SEDATION  Planned agent:[x]Midazolam []Meperidine [x]Sublimaze []Morphine  []Diazepam  []Other:     ASA Classification:  []1 []2 [x]3 []4 []5  Class 1: A normal healthy patient  Class 2: Pt with mild to moderate systemic disease  Class 3: Severe systemic disease or disturbance  Class 4: Severe systemic disorders that are already life threatening. Class 5: Moribund pt with little chances of survival, for more than 24 hours. Mallampati I Airway Classification:   []1 []2 [x]3 []4    [x]Pre-procedure diagnostic studies complete and results available. Comment:    [x]Previous sedation/anesthesia experiences assessed. Comment:    [x]The patient is an appropriate candidate to undergo the planned procedure sedation and anesthesia. (Refer to nursing sedation/analgesia documentation record)  [x]Formulation and discussion of sedation/procedure plan, risks, and expectations with patient and/or responsible adult completed. [x]Patient examined immediately prior to the procedure.  (Refer to nursing sedation/analgesia documentation record)    Ludmila Al MD   Electronically signed 1/8/2021 at 6:40 AM

## 2021-01-09 NOTE — PROGRESS NOTES
Vast band removed from right radial. Gauze and band aide applied. Arm board remains in place. No signs of bleeding noted. Will monitor.
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
## 2021-01-09 NOTE — PRE SEDATION
[address] [address]  Sedation/Analgesia History & Physical    Pt Name: Nico Davis  Account number: [de-identified]  MRN: [id number]  YOB: 1965  Provider Performing Procedure: Gómez Banerjee MD  Referring Provider: Alix Hough MD   Primary Care Physician: Mo Messer MD  Date: 1/9/2021    PRE-PROCEDURE    Code Status: FULL CODE  Brief History/Pre-Procedure Diagnosis:     CP   Remarkably abnormal stress test    Sinus bradycardia  HTN  H/o CVA  Remote Hh/o DVT (Coumadin was held, INR today was 1.4)  Tobacco abuse   Preserved EF  PFO (seen on DARINEL yesterday)    Consent: : I have discussed with the patient risks, benefits, and alternatives (and relevant risks, benefits, and side effects related to alternatives or not receiving care), and likelihood of the success. The patient and/or representative appear to understand and agree to proceed. The discussion encompasses risks, benefits, and side effects related to the alternatives and the risks related to not receiving the proposed care, treatment, and services. The indication, risks and benefits of the procedure and possible therapeutic consequences and alternatives were discussed with the patient. The patient was given the opportunity to ask questions and to have them answered to his/her satisfaction.  Risks of the procedure include but are not limited to the following: Bleeding, hematoma including retroperitoneal hemmorhage, infection, pain and discomfort, injury to the aorta and other blood vessels, rhythm disturbance, low blood pressure, myocardial infarction, stroke, kidney damage/failure, myocardial perforation, allergic reactions to sedatives/contrast material, loss of pulse/vascular compromise requiring surgery, aneurysm/pseudoaneurysm formation, possible loss of a limb/hand/leg, needing blood transfusion, requiring emergent open heart surgery or emergent coronary intervention, the need for intubation/respiratory support, the requirement for defibrillation/cardioversion, and death. Alternatives to and omission of the suggested procedure were discussed. The patient had no further questions and wished to proceed; the consent form was signed. MEDICAL HISTORY  []ASHD/ANGINA/MI/CHF   []Hypertension  []Diabetes  []Hyperlipidemia  []Smoking  []Family Hx of ASHD  []Additional information:       has a past medical history of Cerebral artery occlusion with cerebral infarction [facility]), Deep vein blood clot of left lower extremity ([address] Headache, Hyperlipidemia, Hypertension, Kidney stone, Lymphedema of both lower extremities, and Sciatica of left side. SURGICAL HISTORY   has a past surgical history that includes Nasal septum surgery; Cholecystectomy; Endoscopic ultrasonography, GI; IVC filter insertion; Colonoscopy; and Endoscopy, colon, diagnostic.   Additional information:       ALLERGIES   Allergies as of 01/07/2021 - Review Complete 01/07/2021   Allergen Reaction Noted    Morphine  07/26/2018    Neurontin [gabapentin] Hives 07/26/2018     Additional information:       MEDICATIONS   Aspirin  [x] 81 mg  [] 325 mg  [] None  Antiplatelet drug therapy use last 5 days  [x]No []Yes  Coumadin Use Last 5 Days []No [x]Yes  Other anticoagulant use last 5 days  []No [x]Yes    Current Facility-Administered Medications:     warfarin (COUMADIN) daily dosing (placeholder), , Other, RX Placeholder, [facility], Given at 01/08/21 1141    atorvastatin (LIPITOR) tablet 40 mg, 40 mg, Oral, Nightly, Frank Suh MD, 40 mg at 01/08/21 2111    influenza quadrivalent split vaccine (FLUZONE;FLUARIX;FLULAVAL;AFLURIA) injection 0.5 mL, 0.5 mL, Intramuscular, Prior to discharge, Dar Zamora MD    metoprolol tartrate (LOPRESSOR) tablet 25 mg, 25 mg, Oral, BID, Suzanna Barrow MD, 25 mg at 01/09/21 0850    sodium chloride flush 0.9 % injection 10 mL, 10 mL, Intravenous, 2 times per day, Suzanna Barrow MD, 10 mL at 01/09/21 []Pacemaker/AICD  []DARINEL             []Cardioversion []Peripheral angiography/PTA  []Other:      SEDATION  Planned agent:[x]Midazolam []Meperidine []Sublimaze []Morphine  []Diazepam  []Other:     ASA Classification:  []1 [x]2 []3 []4 []5   Class 1: A normal healthy patient  Class 2: Pt with mild to moderate systemic disease  Class 3: Severe systemic disease or disturbance  Class 4: Severe systemic disorders that are already life threatening. Class 5: Moribund pt with little chances of survival, for more than 24 hours. Mallampati I Airway Classification:   []1 []2 [x]3 []4     [x]Pre-procedure diagnostic studies complete and results available. Comment:    [x]Previous sedation/anesthesia experiences assessed. Comment:    [x]The patient is an appropriate candidate to undergo the planned procedure sedation and anesthesia. (Refer to nursing sedation/analgesia documentation record)  [x]Formulation and discussion of sedation/procedure plan, risks, and expectations with patient and/or responsible adult completed. [x]Patient examined immediately prior to the procedure.  (Refer to nursing sedation/analgesia documentation record)      Josiah Dickey MD, Fredi Farr    Electronically signed 1/9/2021 at 10:52 AM

## 2021-01-09 NOTE — BRIEF OP NOTE
[address] [address]  Sedation/Analgesia Post Sedation Record    Pt Name: Salvador James  Account number: [de-identified]  MRN: [id number]  YOB: 1965  Procedure Performed By: Jose Saul MD   Primary Care Physician: Jalen Griffin MD  Date: 1/9/2021    POST-PROCEDURE    Physicians/Assistants: Jose Saul MD     Procedure Performed:Cath      Sedation/Anesthesia: Versed/ Fentanyl and 2% xylocaine local anesthesia. Estimated Blood Loss: < 50 ml. Specimens Removed: None         Disposition of Specimen: N/A        Complications: No Immediate Complications. Post-procedure Diagnosis/Findings:        Multivessel CAD     Recommendations:   Heart team discussion regarding revascularization, will consult CT surgery      Above findings and plan of care were discussed with patient and his wife, questions were answered, agreeable with plan.        Jose Saul MD, Terese Carlson   Electronically signed 1/9/2021 at 12:42 PM  Interventional Cardiology

## 2021-01-09 NOTE — FLOWSHEET NOTE
[place] [address] PROGRESS NOTE      Patient: Aubrey Velazquez  Room #: 6K-06/006-A            YOB: 1965  Age: 60 y.o. Gender: male            Admit Date & Time: 1/7/2021  1:13 AM    Assessment:  Pt is a 54y.o. male, sitting up in bed watching television, in 6K-06. Shena Ratliff is calm, approachable, pleasant demeanor and coping with having multiple tests yesterday and today without results as yet. He shared the journey that led to him being here-chest pains, particularly intense on a few occasions. Shena Ratliff shared a story about racing cars, after a vintage movie was finishing up when the  entered the room-commonalities regarding shared love of this as a child, seemed to relax him. Shena Ratliff has a supportive family at home-spouse and children. Shena Ratliff also shared that at the time of the pain onset he was scared, as he has heard about people with similar issues and the pain involved, but never experienced it until now. Interventions:   provided active listening, encouragement, questioned Shena Ratliff about his feelings, thoughts and concerns with his issue, conversation and prayer. Outcomes:  Shena Ratliff expressed gratitude for our time together, was relaxed and laughing at our conversation regarding race cars, expressed anticipation in finding answers to the testing and continuing to cope with the discomfort. Plan:  1. Remain available for spiritual support, in particular once diagnosis obtained    Electronically signed by Aashish Singh, on 1/8/2021 at [address]  [phone]       01/08/21 1815   Encounter Summary   Services provided to: Patient   Referral/Consult From: [facility]   Support System Spouse; Children   Continue Visiting Yes  (1/8)   Complexity of Encounter Low   Length of Encounter 15 minutes   Spiritual/Adventism   Type Spiritual support   Assessment Calm; Approachable;Coping;Peaceful Intervention Active listening;Explored feelings, thoughts, concerns;Prayer;Nurtured hope   Outcome Coping;Expressed gratitude;Engaged in conversation;Receptive

## 2021-01-09 NOTE — PROCEDURES
[address] [place] [address]                            CARDIAC CATHETERIZATION    PATIENT NAME: Patrick San                       :        1965  MED REC NO:   [id number]                           ROOM:       0006  ACCOUNT NO:   [de-identified]                           ADMIT DATE: 2021  PROVIDER:     Jose Carlos White MD    DATE OF PROCEDURE:  2021    INDICATIONS FOR PROCEDURE:  1. Chest pain. 2.  Abnormal stress test.    PROCEDURES PERFORMED:  1. Left cardiac catheterization and selective coronary angiogram.  2.  Left ventriculography. DESCRIPTION OF PROCEDURE:  After informed consent, the patient was  brought to the cardiac catheterization room. He was prepped and draped  in a sterile fashion. 2% lidocaine was injected in the skin and  subcutaneous tissue overlying the right radial artery. Using modified  Seldinger technique, I was able to obtain access in the right radial  artery. Standard antithrombotic/antispasmodic medications were given. I then proceeded with left cardiac catheterization using a 6-Armenian AR-2  catheter to engage the right coronary artery. There was difficulty  engaging the RCA. 3DRC and JR-4 were attempted. Also, attempts to  engage the left main with JL catheter were difficult and eventually, I  elected to engage the left main coronary artery using a 6-Armenian EBU  3.75 guide catheter. FINDINGS:  HEMODYNAMICS:  Left ventricular end-diastolic pressure 22 mmHg. LEFT VENTRICULOGRAPHY:  Ejection fraction 55% to 60%. CORONARY ANGIOGRAM:  1. Right coronary artery:  Right coronary artery has chronic total  occlusion in the proximal segment. Distal RCA, RPL and RPDA receive  collaterals from left to right. RCA is a dominant vessel. 2.  Left main coronary artery:  Luminal irregularities, bifurcates into  LCX and LAD.   3.  Left anterior descending artery:  Proximal LAD has luminal irregularities. Mid LAD has 80% stenosis. Distal LAD with mild diffuse  disease. 4.  Left circumflex artery:  Proximal left circumflex artery has mild  luminal irregularities. OM1 has mild diffuse disease. OM2 has mild  diffuse disease. Distal LCX to OM3 has 70% to 80% stenosis. MEDICATIONS:  See MAR. COMPLICATIONS:  None. ESTIMATED BLOOD LOSS:  Less than 50 mL. ACCESS:  Right radial artery access. Vasc Band was applied. Hemostasis  was achieved. IMPRESSION:  Multivessel coronary artery disease as described above. RECOMMENDATIONS:  1. Heart team discussion regarding revascularization. We will consult  Cardiovascular Surgery. 2.  Findings and plan of care discussed with the patient's family.         Steph Garcia MD    D: 01/09/2021 12:49:50       T: 01/09/2021 13:00:42     [email or web address]  Job#: [id number]     Doc#: [id number]    CC:

## 2021-01-10 NOTE — PROGRESS NOTES
Patient arrives from ICU to pre-op holding area. Report is taken from ICU nurse. Nasal swabbing protocol is performed and the patient's temperature is 98.2.

## 2021-01-10 NOTE — ANESTHESIA PRE PROCEDURE
Department of Anesthesiology  Preprocedure Note       Name:  Za Mooney   Age:  60 y.o.  :  1965                                          MRN:  [id number]         Date:  1/10/2021      Surgeon: Patricia Pro):  Snehal Toure MD    Procedure: Procedure(s):  CRANIOTOMY KARLY HOLES    Medications prior to admission:   Prior to Admission medications    Medication Sig Start Date End Date Taking?  Authorizing Provider   metoprolol succinate (TOPROL XL) 50 MG extended release tablet Take 50 mg by mouth daily   Yes Historical Provider, MD   olmesartan-hydroCHLOROthiazide (BENICAR HCT) 40-12.5 MG per tablet Take 1 tablet by mouth daily   Yes Historical Provider, MD   warfarin (COUMADIN) 4 MG tablet Take 4 mg by mouth daily Managed by Dr Temi Gurrola   Yes Historical Provider, MD   Aspirin-Acetaminophen-Caffeine (EXCEDRIN PO) Take 1 tablet by mouth as needed    Historical Provider, MD   naproxen sodium (ALEVE) 220 MG tablet Take 550 mg by mouth as needed for Pain    Historical Provider, MD   Multiple Vitamins-Minerals (THERAPEUTIC MULTIVITAMIN-MINERALS) tablet Take 1 tablet by mouth daily    Historical Provider, MD   Cholecalciferol (VITAMIN D) 2000 units CAPS capsule Take 2 capsules by mouth daily    Historical Provider, MD       Current medications:    Current Facility-Administered Medications   Medication Dose Route Frequency Provider Last Rate Last Admin    ketamine (KETALAR) 50 MG/5ML injection        Stopped at 01/10/21 0211    ketamine (KETALAR) 50 MG/5ML injection        Stopped at 01/10/21 0212    0.9 % sodium chloride infusion   Intravenous PRN Suzanna Barrow MD        hydrALAZINE (APRESOLINE) injection 10 mg  10 mg Intravenous Q6H PRN Suzanna Barrow MD   10 mg at 01/10/21 003    sodium chloride 3 % solution  25 mL/hr Intravenous Continuous Roxana Dopp, APRN - CNP 25 mL/hr at 01/10/21 0149 25 mL/hr at 01/10/21 0149  promethazine (PHENERGAN) injection 6.25 mg  6.25 mg Intramuscular Q6H PRN Suzanna Barrow MD        niCARdipine (CARDENE) 25 mg in dextrose 5 % 250 mL infusion  5 mg/hr Intravenous Continuous Jodine JASMYNE Gagnon - CNP 50 mL/hr at 01/10/21 0535 5 mg/hr at 01/10/21 0535    propofol injection  10 mcg/kg/min Intravenous Titrated Jodine Blotter APRKAYLEEN - CNP 29.7 mL/hr at 01/10/21 0634 40 mcg/kg/min at 01/10/21 0634    meperidine (DEMEROL) injection 12.5 mg  12.5 mg Intravenous Q5 Min PRN Dwain Alanis MD        fentaNYL (SUBLIMAZE) injection 25 mcg  25 mcg Intravenous Q5 Min PRN Dwain Alanis MD        fentaNYL (SUBLIMAZE) injection 50 mcg  50 mcg Intravenous Q5 Min PRN Dwain Alanis MD        HYDROmorphone (DILAUDID) injection 0.25 mg  0.25 mg Intravenous Q5 Min PRN Dwain Alanis MD        HYDROmorphone (DILAUDID) injection 0.5 mg  0.5 mg Intravenous Q5 Min PRN Dwain Alanis MD        promethazine [facility]) injection 12.5 mg  12.5 mg Intramuscular Once PRN Dwain Alanis MD        metoclopramide (REGLAN) injection 10 mg  10 mg Intravenous Once PRN Dwain Alanis MD        diphenhydrAMINE (BENADRYL) injection 12.5 mg  12.5 mg Intravenous Once PRN Dwain Alanis MD        labetalol (NORMODYNE;TRANDATE) injection syringe 5 mg  5 mg Intravenous Q10 Min PRN Dwain Alanis MD        sodium chloride flush 0.9 % injection 10 mL  10 mL Intravenous 2 times per day Bertrand Coto MD   10 mL at 01/09/21 2111    sodium chloride flush 0.9 % injection 10 mL  10 mL Intravenous PRN Bertrand Coto MD        atorvastatin (LIPITOR) tablet 40 mg  40 mg Oral Nightly Mikhail Lancaster MD   40 mg at 01/09/21 2111    metoprolol tartrate (LOPRESSOR) tablet 25 mg  25 mg Oral BID Terell Worthington MD   25 mg at 01/09/21 0850    polyethylene glycol (GLYCOLAX) packet 17 g  17 g Oral Daily PRN Suzanna Barrow MD        acetaminophen (TYLENOL) tablet 650 mg  650 mg Oral Q6H PRN Terell Worthington MD        Or  acetaminophen (TYLENOL) suppository 650 mg  650 mg Rectal Q6H PRN Suzanna Barrow MD         Facility-Administered Medications Ordered in Other Encounters   Medication Dose Route Frequency Provider Last Rate Last Admin    ceFAZolin (ANCEF) injection    PRN Abelardo Lopez MD   2,000 mg at 01/10/21 9755    fentaNYL (SUBLIMAZE) injection    PRN Abelardo Lopez MD   100 mcg at 01/10/21 0700    rocuronium (ZEMURON) injection    PRN Abelardo Lopez MD   50 mg at 01/10/21 0745    0.9 % sodium chloride infusion    Continuous PRN Abelardo Lopez MD   New Bag at 01/10/21 9917       Allergies:     Allergies   Allergen Reactions    Morphine      Pt states he gets severe headaches    Neurontin [Gabapentin] Hives       Problem List:    Patient Active Problem List   Diagnosis Code    Cellulitis L03.90    Rash R21    Chronic anticoagulation Z79.01    Presence of IVC filter Z95.828    Kidney stone N20.0    Acute midline low back pain with right-sided sciatica M54.41    Low back pain radiating to right lower extremity M54.5    Sciatica of left side M54.32    Thunderclap headache G44.53    Essential hypertension I10    Anticoagulated Z79.01    Hx of pulmonary embolus Z86.711    History of DVT (deep vein thrombosis) Z86.718    Migraine G43.909    Constipation K59.00    Chronic acquired lymphedema I89.0    Chest pain, moderate coronary artery risk R07.9       Past Medical History:        Diagnosis Date    Cerebral artery occlusion with cerebral infarction ([address]     Deep vein blood clot of left lower extremity (HCC)     Headache     Hyperlipidemia     Hypertension     Kidney stone 3/4/2019    Lymphedema of both lower extremities     wears lymphedema boots    Sciatica of left side        Past Surgical History:        Procedure Laterality Date    CHOLECYSTECTOMY      COLONOSCOPY      ENDOSCOPIC ULTRASOUND (LOWER)      ENDOSCOPY, COLON, DIAGNOSTIC      IVC FILTER INSERTION      NASAL SEPTUM SURGERY HCG (If Applicable): No results found for: PREGTESTUR, PREGSERUM, HCG, HCGQUANT     ABGs: No results found for: PHART, PO2ART, KNB2YHF, CEY1VYU, BEART, H3QZXGOQ     Type & Screen (If Applicable):  Lab Results   Component Value Date    LABRH POS 01/10/2021       Drug/Infectious Status (If Applicable):  Lab Results   Component Value Date    HEPCAB Negative 01/07/2021       COVID-19 Screening (If Applicable):   Lab Results   Component Value Date    COVID19 NOT DETECTED 01/08/2021    COVID19 Detected 11/16/2020         Anesthesia Evaluation  Patient summary reviewed no history of anesthetic complications:   Airway: Mallampati: Unable to assess / NA       Comment: Patient already intubated   Dental:          Pulmonary:normal exam                               Cardiovascular:    (+) hypertension:,                   Neuro/Psych:   (+) CVA:, neuromuscular disease (sciatica):, headaches: migraine headaches,             GI/Hepatic/Renal:             Endo/Other:                     Abdominal:           Vascular:                                        Anesthesia Plan      general     ASA 5 - emergent     (Grim prognosis, also has severe 3 vessel disease requiring surgical intervention. )  Induction: intravenous. arterial line  MIPS: Postoperative ventilation. Anesthetic plan and risks discussed with Unable to obtain due to emergent nature.       Plan discussed with surgical team.                  Hermila Luna MD   1/10/2021

## 2021-01-10 NOTE — PROCEDURES
EKG was handed to [facility] for [facility] NP, Kriss Talbot MD.

## 2021-01-10 NOTE — CODE DOCUMENTATION
Pt last known well was 2315 when vitals were taken and pt went to the restroom. 2330 - pt placed his call light on asking for his nurse to come in. This RN proceeded into room, upon arrival the pt stated that his left arm felt \"weird\" as he was still able to lift it off of the bed. This RN felt radial pulses bilaterally which were equal. Hand grasp strong on the right and absent on the left. Pedal push and pull were equal bilaterally with equal pulses. No other weakness, numbness, or tingling noted at this time. This RN attempted to obtain a flash light. 2332 - pt placed call light on a second time, this RN returned to room quickly and was able to check pupil dilation with flash light obtained. Pt now unable to lift arm from the bed. This RN called Mike Mike who is NIHSS certified to assess pt. Both he and Dania Alvarenga RN arrived to room. 2338 - code stroke called. . Systolic BP >221.  3779 - /110 manual. P 70. O2 sat 96% on room air. Alice Brownlee RN, [employer], [facility] Brought supervisor, Dr. Bruce Rayo, Dr. Samuel Farooq CNP, [facility] Pharmacist arrived to room. 2347 - 2 L NC placed on pt due to increased respirations and anxiety. /131, , P 86, O2 sat 96%. 2352 - /114, , P 76, O2 sat 94% on 2 L. Marie Henry RN and Shy Hernandez RN transport pt to [address] RN called pt's wife to update her, but she did not answer. Message was left to return phone call. 0 - Dr. Chelsie Perez called the unit to bring stroke bot to ICU as pt was transferred to  16 after CT scan completed.

## 2021-01-10 NOTE — CONSULTS
CRITICAL CARE CONSULT NOTE      Patient:  Jasmyne Ragsdale    Unit/Bed:4D-16/016-A  YOB: 1965  MRN: [id number]   PCP: Usama Ramos MD  Date of Admission: 1/7/2021  Chief Complaint:- intracranial hemorrhage     Assessment and Plan:      1. Acute hypoxic respiratory failure: Requiring 2 L nasal cannula, wean as tolerated. Patient has high probability of intubation. 2. Hemorrhagic stroke: Code stroke was called. Chart was reviewed by Dr. Zehra Beltran of tele stroke. Recommendations were for platelets and DDAVP along with protamine reversal.  3% saline was started per Dr. Chester Abarca recommendations. Repeat CT head in 4 hours   3. Hypertensive emergency: Cardene drip initiated. Goal blood pressure less than 140. Appropriate for rapid reduction of blood pressure due to hemorrhagic stroke. Discussed with Dr. Kaushik Johnson. 4. CAD: Multivessel disease on PCI 1/9, patient was consulted to [facility] who plans for CABG. This will be complicated by hemorrhagic stroke 1/10. ASA stopped, 1/10 due to #2. 5. DVT hx: chronically on coumadin, started on Lovenox 1/9. Stopped secondary to #2  6. Macrocytic anemia: H/H 13.9/41.6  7. Obesity: known   8. Reformed smoker: history   9. Hx CVA: previous CVA, no deficients         INITIAL H AND P AND ICU COURSE:  Jasmyne Ragsdale is a 60year old white male who presented to [facility] on 1/7/2021 for chest pain 10/10 mid sternal radiates to jaw with SOB. He has a past medical history DVT, hypertension, kidney stone, sciatica. Per report patient woke up on the night of admission with 10 out of 10 chest pain. Midsternal and crushing that radiated to the jaw with associated shortness of breath. EMS was called and he was transferred to [facility]. Nitroglycerin did resolve chest pain. He was admitted to stepdown. Cardiology was consulted and patient did undergo stress test and DARINEL on 1/8.   On 1/9 he did undergo heart cath that did show multivessel CAD and [facility] was consulted for possible plan for CABG. Overnight code stroke was called. When arriving a code stroke patient stated he had troubles moving his left side. He did have facial droop. He was very anxious. Blood pressure was noted to be systolic greater than 094. He was taken for emergency CT with plans on CTA of head and neck. Upon completion of CT it was noted that patient had intercranial hemorrhage. He was immediately brought to the ICU. Stroke team did call with recommendations. Dr. Darryle Gold was called as the neurosurgeon on-call. Past Medical History:  Per HPI  Family History: Mother: Diabetes Father: colon cancer, heart disease and heart attack   Social History: Former smoker, denies alcohol use, denies drug use    Review of Systems   Constitutional: Negative for fatigue and fever. HENT: Positive for trouble swallowing. Negative for sore throat. Eyes: Negative for photophobia and visual disturbance. Respiratory: Positive for shortness of breath. Negative for chest tightness and wheezing. Cardiovascular: Positive for leg swelling. Negative for chest pain. Gastrointestinal: Positive for nausea and vomiting. Negative for abdominal pain. Endocrine: Negative for polydipsia and polyphagia. Genitourinary: Negative for decreased urine volume and flank pain. Musculoskeletal: Negative for back pain and neck pain. Skin: Negative for color change, pallor and rash. Allergic/Immunologic: Negative for food allergies and immunocompromised state. Neurological: Positive for speech difficulty, weakness, numbness and headaches. Negative for dizziness. Hematological: Negative for adenopathy. Psychiatric/Behavioral: Negative for agitation and confusion. The patient is nervous/anxious.             Scheduled Meds:   ketamine        ketamine        sodium chloride flush  10 mL Intravenous 2 times per day    atorvastatin  40 mg Oral Nightly    metoprolol tartrate  25 mg Oral BID     Continuous Infusions:   sodium chloride      sodium chloride 25 mL/hr (01/10/21 0149)    niCARdipine 5 mg/hr (01/10/21 0125)       PHYSICAL EXAMINATION:  T:  98.  P:  57. RR:  16. B/P:  145/83. FiO2:  2. O2 Sat:  95.  I/O: 300/0  Body mass index is 34.79 kg/m². General:   Acute on chronically ill-appearing white male  HEENT:  normocephalic and atraumatic. No scleral icterus. PERR  Neck: supple. No Thyromegaly. Lungs: clear to auscultation. No retractions  Cardiac: RRR. No JVD. Abdomen: soft. Nontender. Extremities:  No clubbing, cyanosis. +1 pitting edema bilateral lower extremities     Vasculature: capillary refill < 3 seconds. Palpable dorsalis pedis pulses. Skin:  warm and dry. Psych:  Alert and oriented x3. Affect appropriate  Lymph:  No supraclavicular adenopathy. Neurologic:  Left sided weakness. No seizures. Data: (All radiographs, tracings, PFTs, and imaging are personally viewed and interpreted unless otherwise noted).  Sodium 139, potassium 4.2, chloride 107, CO2 26, BUN 16, creatinine 1.1, magnesium 2.2, glucose 109   WBC 5.5, H/H 13.9/41.6, platelets 129   Telemetry shows NSR    CT head 1/10/2021 reports right frontal lobe intraparenchymal hemorrhage measuring 3.8 cm, minimal local mass-effect, no midline shift. Scattered areas of subarachnoid hemorrhage along the right superior frontal lobe   Transesophageal echocardiogram 1/8/2021 reports ejection fraction 55 to 60%, atrial septal PFO with right to left shunting with increased intracardiac pressures, trace mitral regurg.  Cardiac catheterization 1/9/2021 reports chronic total occlusion of proximal segment distal RCA with collaterals.   80% stenosis to the LAD, 70 to 80% disease to the LCX   INR 1.43   COVID negative           Meets Continued ICU Level Care Criteria:    [x] Yes   [] No - Transfer Planned to listed location:  [] HOSPITALIST CONTACTED-      Case and plan discussed with Dr. Sharmaine Caballero, Dr. Suzette Carney, and Dr. Cara Kyle        Electronically signed by Becky Haskins.  JASMYNE Paige - CNP  CRITICAL CARE SPECIALIST

## 2021-01-10 NOTE — SIGNIFICANT EVENT
Monitor showed ST changes. EKG was obtained. EKG did read acute MI with significant depression and ST elevation. STEMI alert was not called due to inability for patient to go to Cath Lab secondary to acute intraparenchymal head bleed. I did initiate conversation with Dr. Flakita Barney updating him on EKG including PerfectServe enhancement image. I did inform him that I did reverse anticoagulation with protamine, give DDAVP, and administer platelets due to intraparenchymal head bleed. We both did agree that there was no ability to return to Cath Lab due to intraparenchymal hemorrhage. Nursing updated    Electronically signed by Sri Alvarez CNP on 1/10/2021 at 3:57 AM moderate assist (50% patients effort)

## 2021-01-10 NOTE — SIGNIFICANT EVENT
4178 updated Dr. Priscilla Garcia on new CT head results. He states to call OR and get emergent case added. I did call OR and they did state that had an emergent case going in and would have to let us know when patient could go to surgery. Case scheduled at 6 am, family and Dr. Priscilla Garcia updated. Electronically signed by Trinidad Oconnor.  Kylee Alvarez CNP on 1/10/2021 at 5:40 AM

## 2021-01-10 NOTE — VIRTUAL HEALTH
Consults  Patient Location:  [address] [place] [address] ICU 4D    Provider Location (City/State): [place]     60year old patient found to have R cerebral hemorrhage on CT head ordered as stroke alert for acute left hemiparesis. Initial ICH score 0 - Volume 24 cc   Patient on therapeutic anticoagulation and received first dose of therapeutic Lovenox in 1/09 evening, also has been on antiplatelets . Spoke with Dr Pa Healy on phone and stat recs for following. Coagulopathy Reversal with protamine   1 Unit of platelets and ddavp   SBP target < 140   Mannitol dose 1 g/ kg   Stat neurosurgery evaluation   Repeat CT head in 4 hours. Risk of ICH worsening and may need airway secured. [address]     This virtual visit was conducted via interactive/real-time audio/video.

## 2021-01-10 NOTE — ANESTHESIA PROCEDURE NOTES
Arterial Line:    An arterial line was placed in the OR for the following indication(s): continuous blood pressure monitoring and blood sampling needed. A 20 gauge (size), 1 and 3/4 inch (length), Arrow (type) catheter was placed, Seldinger technique used, into the right radial artery, secured by Tegaderm and tape. Anesthesia type: General    Events:  patient tolerated procedure well with no complications and EBL 0mL.   1/10/2021 6:35 AM1/10/2021 6:42 AM  Anesthesiologist: Josi Vital MD  Performed: Anesthesiologist   Preanesthetic Checklist  Completed: patient identified, IV checked, site marked, risks and benefits discussed, surgical consent, monitors and equipment checked, pre-op evaluation, timeout performed, anesthesia consent given, oxygen available and patient being monitored

## 2021-01-10 NOTE — PROGRESS NOTES
Patient arrives with a ring on his left hand. The ring is removed and will be placed in a patient belongings bag and go back with him to ICU.

## 2021-01-10 NOTE — PROCEDURES
ICU PROCEDURE - ENDOTRACHEAL INTUBATION    Jolie Hdz     MRN#: [id number]  1/10/21      Acct #: [de-identified]      : 1965      INDICATION: Inability to protect airway    TIME OUT: taken    Permission obtained, risks/benefits reviewed:     ANESTHESIA:   []Ketamine  []Ativan  [] Morphine  [x]Propofol  [x]Other medications: Etomidate      ESTIMATED BLOOD LOSS:  None. COMPLICATIONS:  [x]N/A  [] Other:    LARYNGOSCOPIC AIRWAY GRADE (CORMACK-LEHANE):[]1  []2a  [x]2b []3  []4        INTUBATION EQUIPMENT USED:  [x] Direct laryngoscope only    OUTCOME: Successful placement of #  7.5  Taperguard Evac endotracheal via   [x]Oral route    INSERTION DEPTH: 24 cm from:  [x]lip           CONFIRMATION OF TUBE POSITION:   [x]Capnography - Strong & repeatable exhaled CO2 detection   [x]Multiple point auscultation   [x]SpO2 response   [x]STAT X-ray   []Bronchoscopic assessment    UNUSUAL FINDINGS: None    PROCEDURE:     Using direct laryngoscopy, the vocal cords were visualized and the endotracheal tube was placed through the cords under direct vision. Good breath sounds were auscultated bilaterally without sounds over abdomen. Appropriate strong & repeatable exhaled CO2 detection was confirmed. X-ray confirmation of ET tube 3.1cm above the joy.       Electronically signed by Placido Her DO on 1/10/2021 at 3:17 AM

## 2021-01-10 NOTE — BRIEF OP NOTE
Brief Postoperative Note      Patient: Kala Carrasquillo  YOB: 1965  MRN: [id number]    Date of Procedure: 1/10/2021    Pre-Op Diagnosis: hemorragic bleed    Post-Op Diagnosis: Same       Procedure(s):  RIGHT CRANIOTOMY WITH EVACUATION OF RIGHT INTRAPARENCHYMAL HEMATOMA, PLACEMENT OF RIGHT DECOMPRESSIVE CRANIAL DRAINS    Surgeon(s):  Patric Gómez MD    Assistant:   Assistant: Samuel Vann  Physician Assistant: Darrell Campo PA-C    Anesthesia: General    Estimated Blood Loss (mL): 612    Complications: None    Specimens:   ID Type Source Tests Collected by Time Destination   A : Intraparenchymal Hematoma Tissue Brain SURGICAL PATHOLOGY Patric Gómez MD 1/10/2021 1028        Implants:  Implant Name Type Inv. Item Serial No.  Lot No. LRB No. Used Action   GRAFT DURA [id number] REGEN MTRX CLLGN BILAYER SUTURABLE  GRAFT DURA [id number] REGEN MTRX CLLGN BILAYER SUTURABLE  [employer] [id number] Right 1 Implanted   SCREW BNE L4MM DIA1.5MM CRAN SELF DRL CRSS DRV THINFLAP  SCREW BNE L4MM DIA1.5MM CRAN SELF DRL CRSS DRV THINFLAP  [employer]  Right 14 Implanted   PLATE BUR H L [id number] 5 H TI BENT W/O TAB NONCOMPRESSION  PLATE BUR H L [id number] 5 H TI BENT W/O TAB NONCOMPRESSION  [employer]  Right 2 Implanted   PLATE BONE M [id number] THK0.3MM 5 H CRAN TI NONCOMPRESSION  PLATE BONE M E57.2DI THK0.3MM 5 H CRAN TI NONCOMPRESSION  [employer]  Right 2 Implanted         Drains: Posterior drain to [place]. Right drain is connected to EVD to be positioned at 10.    NG/OG/NJ/NE Tube Orogastric Left mouth (Active)       Urethral Catheter (Active)   $ Urethral catheter insertion $ Not inserted for procedure 01/10/21 0330   Catheter Indications Need for fluid management in critically ill patients in a critical care setting not able to be managed by other means such as BSC with hat, bedpan, urinal, condom catheter, or short term intermittent urethral catherization 01/10/21 0330   Site Assessment Pink 01/10/21 0330   Urine Color Yellow 01/10/21 0330   Urine Appearance Sediment 01/10/21 0330       Ventricular Device Ventricular drainage catheter Right Parietal region (Active)       Ventricular Device Ventricular drainage catheter Left Parietal region (Active)       [REMOVED] NG/OG/NJ/NE Tube Nasogastric Left nostril (Removed)       Findings: Intercranial hemorrhage    Electronically signed by Melsia Decker PA-C on 1/10/2021 at 10:56 AM

## 2021-01-10 NOTE — PROGRESS NOTES
Neurosurgery interim note. Patient medical records and brain imaging studies were reviewed. The case was discussed with the ICU team.  A detailed neurosurgery note will come later. Patient recommendation treatment plan from neurosurgical perspective at this time:    · Medical management per ICU team.  · Per stroke team recommendation  · Coagulation profile with platelet mapping. · Revise Lovenox per protocol. · Transfuse the patient with the platelets  · Keep systolic blood pressure less than 160 and above 100. · New brain CT after 4-hour.   · Neurosurgery will follow Coumadin/Vitamin K interaction, vitamin K points and serving sizes, consistent vitamin K intake.

## 2021-01-10 NOTE — FLOWSHEET NOTE
0010- patient arrived to unit vomiting, NIH 7 per report    0012- zofran 4 mg given    0014- /101 (118). Dr Anand Torres, Dr Tung Land CNP, Ana Beckett, Andrea Aranda RN, Monica Gotti Rn at bedside    4483- 50 protamine given via Zia Cote CNP    3592- new orders given via Dr Vanessa Gomez CNP on the phone with Dr Man Ahr- patient on the phone with daughter    0029- 2 mcg DDAVP given by Zia Cote CNP    0030- NIH 16    0031- 10 mg of hydraliazine given  bp 145/85 (103)    0043 Dr Cesar Marshall, Zia Cote CNP, Dr. Anand Torres at bedside to place [facility] CVC. Timeout performed at this time. 0044 Phenergan 25mg given. 0100 Decision made to wait on intubation at this time. 0106 Rad to room, placement of RIJ CVC confirmed. 0120 NG placed in L nare @70cm. Orders placed for portable. 0131 Rad to room. 0246 ST changes noted on monitor, orders placed for EKG and call placed to tech. 0503 Discussion with iZa Cote CNP about need to intubate pt for airway protection. 0252 EKG tech to room, and EKG obtained, EKG results discussed with Zia Cote CNP, now at bedside. 1-1 call placed to Dr. Yvan York. 0255 20 Etomidate given IVP by Uma Sheehan CNP.    0259 20 Propofol IVP given by Uma Sheehan CNP.     0305 ETT placed 23 @lip by Dr. Anand Torres, positive color change, bilateral breath sounds, secured by reggie. New OG placed, and secured to ETT @ 69. Portable order placed. 3039 Rad to floor, confirmation of placement of ETT and OG.    0410 Pt to CT.    0456 Results of CT called to floor to Deniz chow RN, she then relayed results to this RN. Results of CT discussed with Zia Cote CNP at this time. Call placed to Dr. Ankush Duggan by Uma Sheehan to discuss case, with decision to send pt to OR.    0615 Pt transported to Pre-op via this RN, Geneva Wilcox, Latricia Pedro RT, and Tendr pt tech. 6213 Pt transferred to Op bed and hand-off report given to OR staff.

## 2021-01-10 NOTE — CONSULTS
[address] [facility] [place], [address] [facility]                                          NEUROSURGICAL CONSULTATION NOTE       Esther Cameron   YOB: 1965  Account Number: [de-identified]   Date of Examination: 1/10/2021    ASSESSMENT:    -Patient was seen and examined in the ICU. -This is a 58-year-old male with acute right-sided cerebral intraparenchymal and intraventricular bleed ventricular hemorrhage.  -Patient is critically ill, he is intubated with significant decline in his neurological status.  -Patient has other significant comorbidity (advanced multiple vessels CAD). PLAN:    · Medical management per ICU team.  · Patient intracranial bleed most likely due to a combination hypertensive type of a bleed and ischemic stroke with secondary transformation. .  3% hypertonic saline. · Seizures precaution. · Keep systolic blood pressure less than 140 and above 100. · Stat coagulation profile and platelet mapping. · Transfuse the patient with 1 unit of platelet. · Decision making:  - Based on patient's current neurological status and the findings of his brain CTs, I recommended for the patient an emergency surgical intervention mainly in the form of right craniotomy and evacuation of patient right  sided  intraparenchymal hemorrhage plus placement of right-sided EVD. -Above recommendation discussed with other treatment team (the ICU) team and they are in agreement.   Also I discussed with this option with patient family (wife and children) in detail and explained to them the associated risk and benefit and realistic expectation of this option.  - I spoke with patient's complex detail the above highlighted surgical treatment option.  - I discussed with them in detail the associated risks and benefits.  - I discussed with her the alternative treatment approaches at this time including not to do any surgical intervention.  - I have I emphasized to the patient's family that there is no guarantee that how much the surgery will improve patient current neurological status.  -I emphasized to the patient's wife that the main goal of surgery of life saving and not to save the quality of life. I told her that it is impossible at this point to predict the degree of patient neurological deficit if he survives. -Told them that patient current condition is very critical and the prognosis is poor in general given patient current regimen radiological features of his intracranial bleed and his current surgical status, in addition to the fact that he has significant underlying cardiac issue and the fact that he is currently on aspirin.  -The discussion was carried out in front of neurosurgery PA Dolly Shah) and an ICU nurse practitioner(Mirian). All questions and concerns were answered and addressed. - Patient's wife elected to proceed with the proposed surgical surgical intervention (right craniotomy and evacuation of right  sided intraparenchymal hematoma  post placement right-sided EVD   -The plan now for patient is to take him to surgery as soon as possible. * Time spent was at least 35 min of critical time of evaluating patient, discussion with staff, planning for treatment and evaluation. The time was spent examining patient face to face, reviewing the images personally, reviewing the chart, perform high complexity decision making and speaking with the nursing staff regarding recommendations. HISTORY OF PRESENT ILLNESS:    Michael Gant is a 60 y.o. male, admitted on :1/7/2021  1:13 AM  This is a 60year old male with past medical history DVT, hypertension, kidney stone, sciatica. who presented initially  to [facility] on 1/7/2021 because of chest pain 10/10. Yesterday, pateint undergo heart cath that showed multivessel CAD and CVS was consulted for possible plan for CABG. However, overnight code stroke was called.   When arriving a code stroke patient stated he had difficulty in moving his left side. He did have facial droop. He was very anxious. Blood pressure was noted to be systolic greater than 537. Patient underwent emergency brain CT that showed:      1.  Right frontal lobe intraparenchymal hemorrhage measuring up to 3.8 cm.     Minimal local mass-effect.  No midline shift. 2.  Scattered areas of subarachnoid hemorrhage along the superior right    frontal lobe. For that reason patient was transfused to the ICU and neurosurgery team was consulted. After transferring patient to the ICU his neurological status declined further. He was intubated and the repeated brain CT showed progression patient intra parenchymal bleed with extension of the bleed to the brain ventricular system. Patient is on aspirin and Lovenox. ROS:    Review of Systems  I  Was not able to obtain a comprehensive ROS because of patient's level of consciousness       PROBLEM LIST:  Patient Active Problem List   Diagnosis    Cellulitis    Rash    Chronic anticoagulation    Presence of IVC filter    Kidney stone    Acute midline low back pain with right-sided sciatica    Low back pain radiating to right lower extremity    Sciatica of left side    Thunderclap headache    Essential hypertension    Anticoagulated    Hx of pulmonary embolus    History of DVT (deep vein thrombosis)    Migraine    Constipation    Chronic acquired lymphedema    Chest pain, moderate coronary artery risk       MEDICATIONS:   Prior to Admission medications    Medication Sig Start Date End Date Taking?  Authorizing Provider   metoprolol succinate (TOPROL XL) 50 MG extended release tablet Take 50 mg by mouth daily   Yes Historical Provider, MD   olmesartan-hydroCHLOROthiazide (BENICAR HCT) 40-12.5 MG per tablet Take 1 tablet by mouth daily   Yes Historical Provider, MD   warfarin (COUMADIN) 4 MG tablet Take 4 mg by mouth daily Managed by Dr Vashti Thao Provider, MD Aspirin-Acetaminophen-Caffeine (EXCEDRIN PO) Take 1 tablet by mouth as needed    Historical Provider, MD   naproxen sodium (ALEVE) 220 MG tablet Take 550 mg by mouth as needed for Pain    Historical Provider, MD   Multiple Vitamins-Minerals (THERAPEUTIC MULTIVITAMIN-MINERALS) tablet Take 1 tablet by mouth daily    Historical Provider, MD   Cholecalciferol (VITAMIN D) 2000 units CAPS capsule Take 2 capsules by mouth daily    Historical Provider, MD       Current Facility-Administered Medications   Medication Dose Route Frequency Provider Last Rate Last Admin    ketamine (KETALAR) 50 MG/5ML injection        Stopped at 01/10/21 0211    ketamine (KETALAR) 50 MG/5ML injection        Stopped at 01/10/21 0212    0.9 % sodium chloride infusion   Intravenous PRN Suzanna Barrow MD        hydrALAZINE (APRESOLINE) injection 10 mg  10 mg Intravenous Q6H PRN Suzanna Barrow MD   10 mg at 01/10/21 0031    sodium chloride 3 % solution  25 mL/hr Intravenous Continuous JASMYNE Flores CNP 25 mL/hr at 01/10/21 0149 25 mL/hr at 01/10/21 0149    promethazine (PHENERGAN) injection 6.25 mg  6.25 mg Intramuscular Q6H PRN Suzanna Barrow MD        niCARdipine (CARDENE) 25 mg in dextrose 5 % 250 mL infusion  5 mg/hr Intravenous Continuous JASMYNE Flores CNP 50 mL/hr at 01/10/21 0535 5 mg/hr at 01/10/21 0535    propofol injection  10 mcg/kg/min Intravenous Titrated JASMYNE Flores CNP   1,000 mg at 01/10/21 0300    meperidine (DEMEROL) injection 12.5 mg  12.5 mg Intravenous Q5 Min PRN Harry Ragsdale MD        fentaNYL (SUBLIMAZE) injection 25 mcg  25 mcg Intravenous Q5 Min PRN Harry Ragsdale MD        fentaNYL (SUBLIMAZE) injection 50 mcg  50 mcg Intravenous Q5 Min PRN Harry Ragsdale MD        HYDROmorphone (DILAUDID) injection 0.25 mg  0.25 mg Intravenous Q5 Min PRN Harry Ragsdale MD        HYDROmorphone (DILAUDID) injection 0.5 mg  0.5 mg Intravenous Q5 Min PRN Renaee Beams, MD Georgianna Olszewski has a past medical history of Cerebral artery occlusion with cerebral infarction [facility]), Deep vein blood clot of left lower extremity ([facility] [address] Headache, Hyperlipidemia, Hypertension, Kidney stone, Lymphedema of both lower extremities, and Sciatica of left side. PAST SURGICAL  HISTORY:    has a past surgical history that includes Nasal septum surgery; Cholecystectomy; Endoscopic ultrasonography, GI; IVC filter insertion; Colonoscopy; and Endoscopy, colon, diagnostic. SOCIAL HISTORY:   Social History     Tobacco Use    Smoking status: Former Smoker     Types: Cigarettes     Quit date: 2007     Years since quittin.4    Smokeless tobacco: Never Used   Substance Use Topics    Alcohol use: Not Currently     Comment: Rare       FAMILY HISTORY:  Family History   Problem Relation Age of Onset    Diabetes Mother     Colon Cancer Father     Heart Disease Father     Heart Attack Father        LABS  CBC:   Lab Results   Component Value Date    WBC 12.2 01/10/2021    RBC 4.41 01/10/2021    HGB 14.2 01/10/2021    HCT 41.5 01/10/2021    MCV 94.1 01/10/2021    MCH 32.2 01/10/2021    MCHC 34.2 01/10/2021     01/10/2021    MPV 10.8 01/10/2021     CMP:    Lab Results   Component Value Date     01/10/2021    K 3.6 01/10/2021    K 3.9 2021     01/10/2021    CO2 26 01/10/2021    BUN 15 01/10/2021    CREATININE 1.0 01/10/2021    LABGLOM 77 01/10/2021    GLUCOSE 175 01/10/2021    PROT 6.2 2021    LABALBU 3.8 2021    CALCIUM 8.6 01/10/2021    BILITOT 0.4 2021    ALKPHOS 26 2021    AST 40 2021    ALT 70 2021     PT/INR:    Lab Results   Component Value Date    INR 1.28 01/10/2021       RADIOLOGY:  Pertinent images have been reviewed.   1.  Interval increase in size of intraparenchymal hemorrhage in the right    frontal/parietal lobes with new right to left midline shift of 3 mm.     There is now enlargement of the left lateral ventricle likely representing    a component of obstructive hydrocephalus. 2.  Interval development of intraventricular hemorrhage within the    lateral, third and fourth ventricles. 3.  Interval increase in the amount of subarachnoid hemorrhage along the    superior right frontal lobe.          EXAMINATION:  Intubated and sedated   GCS 3T  Pupils: equals and reactive   Has corneal reflex  Has cough reflex    Stephan Jennings MD  Electronically signed 1/10/2021

## 2021-01-10 NOTE — PROGRESS NOTES
I CALLED [employer] TO ASK THEM TO TAKE CUSTODY OF THIS PATIENT'S RING. NO FAMILY PRESENT TO TAKE THE RING. [employer] TOLD ME THAT THEY CAN'T TAKE CUSTODY OF THE RING BECAUSE  THE PATIENT DIDN'T SIGN A FORM INDICATING THAT THEY COULD HAVE  [address].  Ana Gordon SUPPOSED TO KEEP  [address] WITH HIS BELONGINGS. IT IS IN A BAG WITH HIS NAME LABEL ON IT, AND IT IS CLIPPED TO THE PAPERS THAT CONSTITUTE HIS CHART.

## 2021-01-10 NOTE — PROGRESS NOTES
Intensive Care Unit  Medical Intensive Care Unit  Attending Progress Note        Team members present on rounds:  Family, Nursing, Patient and Respiratory Therapist    ICU guidelines/prophylaxis active for the following:    head above bed above 30 degrees, insulin guidelines, DVT prophylaxis, ulcer prophylaxis and analgesia/sedation guidelines    Patient Examined? yes    Additions/differences/highlights to the resident's/fellow's exam:  VITALS:  BP (!) 117/54   Pulse 87   Temp 98.3 °F (36.8 °C) (Oral)   Resp 13   Ht 6' 1\" (1.854 m)   Wt 272 lb 11.3 oz (123.7 kg)   SpO2 99%   BMI 35.98 kg/m²   24HR INTAKE/OUTPUT:    Intake/Output Summary (Last 24 hours) at 1/10/2021 1240  Last data filed at 1/10/2021 1043  Gross per 24 hour   Intake 1550 ml   Output 400 ml   Net 1150 ml     VENT SETTINGS:    Vent Information  $Ventilation: $Initial Day  Skin Assessment: Clean, dry, & intact  Equipment ID: [id number]  Vent Type: C2G5 [place]  Vent Mode: PCV+  Pressure Ordered: 26(pcontrol 20. increased do to low vt)  Rate Set: 12 bmp  Peak Flow: 22.4 L/min  FiO2 : 45 %  SpO2: 99 %  SpO2/FiO2 ratio: 220  Sensitivity: 3  PEEP/CPAP: 6  I Time/ I Time %: 1 s  Humidification Source: HME  Nitric Oxide/Epoprostenol In Use?: No  Additional Respiratory  Assessments  Pulse: 87  Resp: 13  SpO2: 99 %  End Tidal CO2: 42  Position: Supine  Humidification Source: HME  Subglottic Suction Done?: Yes    CONSTITUTIONAL:  Acute on chronically ill-appearing white male, intubated, sedated not in distress. HEENT:  S/p decompression craniectomy for evacuation of intraparenchymal hemorrhage and insertion of ventricular drain with ICP monitor the scalp covered. .. No scleral icterus. PERR  Neck: supple. No Thyromegaly. Lungs: clear to auscultation. No retractions, symmetric expansion with no rhonchi wheezes crackles  Cardiac: RRR. No JVD. Normal heart sounds no murmur gallop or rub. Abdomen: soft. No distention bowel sounds minimal no organomegaly. Extremities:  No clubbing, cyanosis. +1 pitting edema bilateral lower extremities     Vasculature: capillary refill < 3 seconds. Palpable dorsalis pedis pulses. Skin:  warm and dry. Psych:   Unobtainable. Postop  Lymph:  No supraclavicular adenopathy. Neurologic:  Tenable postop   DATA:  CBC:   Lab Results   Component Value Date    WBC 12.2 01/10/2021    RBC 4.41 01/10/2021    HGB 14.2 01/10/2021    HCT 41.5 01/10/2021    MCV 94.1 01/10/2021     01/10/2021     CMP:    Lab Results   Component Value Date     01/10/2021    K 3.6 01/10/2021    K 3.9 01/08/2021     01/10/2021    CO2 26 01/10/2021    BUN 15 01/10/2021    CREATININE 1.0 01/10/2021    LABGLOM 77 01/10/2021    GLUCOSE 175 01/10/2021    PROT 6.2 01/08/2021    CALCIUM 8.6 01/10/2021    BILITOT 0.4 01/08/2021    ALKPHOS 26 01/08/2021    AST 40 01/08/2021    ALT 70 01/08/2021       KEY ISSUES/FINDINGS/ASSESSMENT AND PLAN:    Marc Pagan is a 60year old white male who presented to [facility] on 1/7/2021 for chest pain 10/10 mid sternal radiates to jaw with SOB. He has a past medical history DVT, hypertension, kidney stone, sciatica. Per report patient woke up on the night of admission with 10 out of 10 chest pain. Midsternal and crushing that radiated to the jaw with associated shortness of breath. EMS was called and he was transferred to [facility]. Nitroglycerin did resolve chest pain. He was admitted to stepdown. Cardiology was consulted and patient did undergo stress test and DARINEL on 1/8. On 1/9 he did undergo heart cath that did show multivessel CAD and CVS was consulted for possible plan for CABG. Overnight code stroke was called. When arriving a code stroke patient stated he had troubles moving his left side. He did have facial droop. He was very anxious. Blood pressure was noted to be systolic greater than 315. He was taken for emergency CT with plans on CTA of head and neck.   Upon completion of CT it was noted that patient had intercranial hemorrhage. He was immediately brought to the ICU. Stroke team did call with recommendations. Dr. Geri Epperson was called as the neurosurgeon on-call. 1.10.2021: S/p right craniotomy with evacuation of right intraparenchymal hematoma, placement of right decompressive cranial drains with intracranial pressure monitor. Kept intubated with a neurochecks with stable hemodynamics. Assessment and Plan:       1. Acute hypoxic respiratory failure: Postop, neurochecks every hour with hypertonic saline given SBT with weaning sedation a.m. 2. Hemorrhagic stroke: S/pRIGHT CRANIOTOMY WITH EVACUATION OF RIGHT INTRAPARENCHYMAL HEMATOMA, PLACEMENT OF RIGHT DECOMPRESSIVE CRANIAL DRAINS; neuro check, hypertonic saline 3% with sodium monitor every 2 hours, aggressive supportive care include treatment of fever electrolyte replacement and blood pressure control. 3. Hypertensive emergency: On Cardene drip, target systolic blood pressure to be 1 20-1 40 with an A-line inserted in OR. 4. CAD: Multivessel disease on PCI 1/9, CABG to be done after his brain condition settled. 5. DVT hx: chronically on coumadin, started on Lovenox 1/9. Stopped secondary to #2, SCD currently  6. Macrocytic anemia: Stable, monitor   7. obesity: known   8. Reformed smoker: history   9.  Hx CVA: previous CVA, no deficients     Critical condition with guarded prognosis  Cc time 37 min apart from procedures

## 2021-01-10 NOTE — OP NOTE
[address] [address]  RECORD OF OPERATION    Patient name: Candace Dejesus  Medical Record Number: [id number]  Account Number: [de-identified]      Date of Procedure: 1/10/2021    Pre-operative Diagnosis:  · Intraparenchymal right frontoparietal hemorrhage. · Cerebral intraventricular hemorrhage. · Acute hydrocephalus. · Rapid decline in the level of consciousness and neurological status. · Advanced cardio vascular disease (CAD). Post-operative Diagnosis:     The same. PROCEDURE:    1- Right-sided frontal EVD placement. 2- Right-sided frontoparietal craniotomy and evacuation of large frontoparietal parenchymal hemorrhage    Anesthesia: General endotracheal    Surgeon: Leia Smallwood MD   Assistant: Tyrese Alvarez PA-C    Estimated Blood Loss: 500 ml    Drains: Right frontal EVD and had another posterior drain placed in the surgical cavity. Blood Transfusions: None     Complications: none immediately appreciated    Specimens: From right-sided intraparenchymal hematoma. Indications for Procedure:     -This is a 49-year-old male with acute right-sided cerebral intraparenchymal and intraventricular bleed ventricular hemorrhage associated with significant decline in his neurological status. Patient has other significant comorbidity (advanced multiple vessels CAD).  - Based on patient's current neurological status and the findings of his brain CTs, I recommended for the patient an emergency surgical intervention mainly in the form of right craniotomy and evacuation of patient right  sided  intraparenchymal hemorrhage plus placement of right-sided EVD.   -The above neurosurgical recommendations were discussed with other treatment team (the ICU) team and they are in agreement.  Also I discussed these recommendations patient family (wife and children) in detail and explained to them the associated risk and benefit and realistic expectation.  - I spoke with patient's  family in detail the above highlighted treatment.       Fadumo Ely PA-C ( Neurosurgery PA) assisted throughout the procedure with positioning, draping, retraction, wound closure, and dressing application     Charity Ontiveros MD  Electronically signed by me on 1/10/2021 at 11:08 AM

## 2021-01-10 NOTE — PROCEDURES
Central Venous Catheterization Procedure Note    Indication:  [] Lack of adequate peripheral iv access    [x] Infusion of medications with high risk of extravasation injury  [] Hemodynamic monitoring  [] Extracorporeal therapies  [] Other                     Time Out:  [x] Time out was completed immediately prior to the start of the procedure which included verification of the correct patient, correct site and agreement on the procedure to be done. [] Time out was not done because procedure was emergent      Consent:  [x] The patient/surrogate decision maker was informed of the procedure indications, risks, benefits and alternatives. Questions were answered and consent was obtained to proceed with the procedure. [] Consent was not obtained, because the procedure was emergent or patient was unable to consent and surrogate decision maker was not available. Type of Central Line Being Placed:   [x] Central venous    [] Hemodialysis  [] Pulmonary artery    Location:   [x] Internal Jugular Vein [x] Right [] Left  [] Subclavian Vein  [] Right [] Left  [] Femoral Vein   [] Right [] Left      Central Line Bundle:  [x] I washed/disinfected my hands prior to starting procedure  [x] Hat      [x] Mask      [x] Sterile gown      [x] Sterile gloves were worn throughout the procedure  [x] Everyone in the room during the procedure wore a mask  [x] Chlorhexidine was utilized to prep the skin and allowed to dry completely  [] Povidone-iodine was used to prep the skin and allowed to dry completely  [x] Full body drape was used to cover the patient    Ultrasound Guidance:   [x] Yes      [] No    Anesthetic Used:  [x] Lidocaine      [] Other    Sterile Technique Used Throughout Procedure?   [x] Yes      [] No    Description/Findings:   [x] Dark nonpulsatile blood return obtained from all ports  [] Appropriate wavefom(s) seen  [] Other    Complications:  [x] None apparent  [] Other:    EBL: <2cc    Follow-up x-ray: Completed and reviewed with appropriate placement. Procedure Performed By: Asael Centeno DO    Assistant(s): If supervised: Dr. Margret Alvarez was physically present and supervised, all key elements of this procedure.     Electronically signed by Asael Centeno DO on 1/10/2021 at 1:00 AM

## 2021-01-11 NOTE — FLOWSHEET NOTE
01/11/21 1632   Encounter Summary   Services provided to: Patient and family together   Referral/Consult From: [address]; Children   Continue Visiting Yes  (1/11 NR)   Complexity of Encounter Low   Length of Encounter 15 minutes   Spiritual/Taoism   Type Spiritual support   Assessment Coping   Intervention Nurtured hope;Prayer   Assessment: In my encounter with the 60 yr old patient in ICU (they were non-responsive) so I had conversation with the patients family. I also came to assess the present spiritual needs of the family. The pt was admitted due to chest pain and coronary artery issues. Interventions:  I provided, prayer, emotional support and words of comfort. Outcomes: The patients family was grateful and didnt share any further spiritual needs at this time. The pts family shared that they were appreciative for the support. Plan:  1. Chaplains will follow-up at a later time for assessment of any spiritual care needs present.         2.   The Chaplains will be available to provide further emotional support per request.
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
## 2021-01-11 NOTE — PROGRESS NOTES
staff, planning for treatment and evaluation. The time was spent examining patient face to face, reviewing the images personally, reviewing the chart, perform high complexity decision making and speaking with the nursing staff regarding recommendations. Jasmyne Ragsdale is a 60 y.o. male, admitted on :1/7/2021  1:13 AM    SUBJECTIVE:      Patient is a still intubated not sedated. Withdrawal to pain in all his extremities per his nurse when he is off sedation (sometimes I saw spontaneous movement in his head). OBJECTIVE:      Patient is still intubated and sedated. GCS 6T: ( M1, M4, V1T). Pupils equal but not reactive. Cough reflex  Has corneal reflex. VITALS:    Vitals:    01/11/21 0700 01/11/21 0800 01/11/21 0805 01/11/21 0900   BP: 99/66 103/71  100/66   Pulse: 66 68 70 66   Resp: 20 20 16 13   Temp:  97.7 °F (36.5 °C)     TempSrc:  Bladder     SpO2: 99% 99% 100% 96%   Weight:       Height:           RADIOLOGY:  Pertinent images have been reviewed. Today brain CT:    There is slight decrease in the amount of intraventricular blood in the    fourth ventricle since the previous exam. Bretta [place] is a persistent large    amount of intraventricular blood within the lateral and third ventricles.     There is stable right parietal intraparenchymal and subarachnoid    hemorrhage. There is stable midline shift to the left and pneumocephalus. A catheter tip remains within the anterior horn of the right lateral    ventricle.  There is stable bilateral ventricular prominence. There are stable areas of low attenuation throughout the right parietal    lobe and bilateral frontal lobes as well as within the right basal ganglia    region and the corpus callosum.              PROBLEM LIST:  Patient Active Problem List   Diagnosis    Cellulitis    Rash    Chronic anticoagulation    Presence of IVC filter    Kidney stone    Acute midline low back pain with right-sided sciatica    Low back pain radiating to right lower extremity    Sciatica of left side    Thunderclap headache    Essential hypertension    Anticoagulated    Hx of pulmonary embolus    History of DVT (deep vein thrombosis)    Migraine    Constipation    Chronic acquired lymphedema    Chest pain, moderate coronary artery risk    Acute coronary syndromes (HCC)    Intraparenchymal hemorrhage of brain (HCC)    S/P evacuation of hematoma    Acute respiratory failure (HCC)       MEDICATIONS:   Prior to Admission medications    Medication Sig Start Date End Date Taking?  Authorizing Provider   metoprolol succinate (TOPROL XL) 50 MG extended release tablet Take 50 mg by mouth daily   Yes Historical Provider, MD   olmesartan-hydroCHLOROthiazide (BENICAR HCT) 40-12.5 MG per tablet Take 1 tablet by mouth daily   Yes Historical Provider, MD   warfarin (COUMADIN) 4 MG tablet Take 4 mg by mouth daily Managed by Dr Nicholas Spangler   Yes Historical Provider, MD   Aspirin-Acetaminophen-Caffeine (EXCEDRIN PO) Take 1 tablet by mouth as needed    Historical Provider, MD   naproxen sodium (ALEVE) 220 MG tablet Take 550 mg by mouth as needed for Pain    Historical Provider, MD   Multiple Vitamins-Minerals (THERAPEUTIC MULTIVITAMIN-MINERALS) tablet Take 1 tablet by mouth daily    Historical Provider, MD   Cholecalciferol (VITAMIN D) 2000 units CAPS capsule Take 2 capsules by mouth daily    Historical Provider, MD       Current Facility-Administered Medications   Medication Dose Route Frequency Provider Last Rate Last Admin    sodium chloride 3 % solution  25 mL/hr Intravenous Continuous JASMYNE Bills - CNP 25 mL/hr at 01/11/21 0047 25 mL/hr at 01/11/21 0047    dexmedetomidine (PRECEDEX) 400 mcg in sodium chloride 0.9 % 100 mL infusion  0.4 mcg/kg/hr Intravenous Continuous Carloz Espinal MD        hydrALAZINE (APRESOLINE) injection 10 mg  10 mg Intravenous Q6H PRN Greg Alfred PA-C   10 mg at 01/10/21 0031    promethazine (PHENERGAN) injection 6.25 mg  6.25 mg Intramuscular Q6H PRN Cloyce Arreola, PA-C        niCARdipine (CARDENE) 25 mg in dextrose 5 % 250 mL infusion  5 mg/hr Intravenous Continuous Cloyce Arreola, PA-C 10 mL/hr at 01/11/21 0600 1 mg/hr at 01/11/21 0600    propofol injection  10 mcg/kg/min Intravenous Titrated Cloyce Arreola, PA-C 29.7 mL/hr at 01/11/21 0547 40 mcg/kg/min at 01/11/21 0547    sodium chloride flush 0.9 % injection 10 mL  10 mL Intravenous 2 times per day Cloyce Arreola, PA-C   10 mL at 01/11/21 0815    sodium chloride flush 0.9 % injection 10 mL  10 mL Intravenous PRN Cloyce Arreola, PA-C        promethazine (PHENERGAN) tablet 12.5 mg  12.5 mg Oral Q6H PRN Cloyce Arreola, PA-C        Or    ondansetron [facility]) injection 4 mg  4 mg Intravenous Q6H PRN Cloyce Arreola, PA-C        0.9 % sodium chloride infusion   Intravenous Continuous Cloyce Arreola, PA-C 75 mL/hr at 01/11/21 0849 New Bag at 01/11/21 0849    ceFAZolin (ANCEF) 3 g in dextrose 5 % 100 mL IVPB  3 g Intravenous Q8H Cloyce Arreola, PA-C   Stopped at 01/11/21 0844    HYDROcodone-acetaminophen (NORCO) 5-325 MG per tablet 1 tablet  1 tablet Oral Q6H PRN Cloyce Arreola, PA-C        HYDROmorphone (DILAUDID) injection 0.5 mg  0.5 mg Intravenous Q3H PRN Cloyce Arreola, PA-C   0.5 mg at 01/10/21 1547    losartan (COZAAR) tablet 100 mg  100 mg Oral Daily Cloyce Arreola, PA-C   100 mg at 01/10/21 1700    And    hydroCHLOROthiazide (HYDRODIURIL) tablet 12.5 mg  12.5 mg Oral Daily Cloyce Arreola, PA-C        fentaNYL 5 mcg/ml in 0.9%  ml infusion  25 mcg/hr Intravenous Continuous Nolon Cowboy, APRN - CNP   Stopped at 01/10/21 2030    levETIRAcetam (KEPPRA) 1,000 mg in sodium chloride 0.9 % 100 mL IVPB  1,000 mg Intravenous Q12H Stephan Jennings MD   Stopped at 01/11/21 0826    sodium chloride flush 0.9 % injection 10 mL  10 mL Intravenous 2 times per day Anthony Arreola PA-C   10 mL at 01/11/21 0815    sodium chloride flush 0.9 % injection 10 mL  10 mL Intravenous PRN Yogesh Juarez PA-C        atorvastatin (LIPITOR) tablet 40 mg  40 mg Oral Nightly Yogesh Juarez PA-C   40 mg at 01/10/21 2319    metoprolol tartrate (LOPRESSOR) tablet 25 mg  25 mg Oral BID LANA Felton-C   25 mg at 01/10/21 2318    polyethylene glycol (GLYCOLAX) packet 17 g  17 g Oral Daily PRN Yogesh Juarez PA-C                hydrALAZINE, 10 mg, Q6H PRN      promethazine, 6.25 mg, Q6H PRN      sodium chloride flush, 10 mL, PRN      promethazine, 12.5 mg, Q6H PRN    Or      ondansetron, 4 mg, Q6H PRN      HYDROcodone 5 mg - acetaminophen, 1 tablet, Q6H PRN      HYDROmorphone, 0.5 mg, Q3H PRN      sodium chloride flush, 10 mL, PRN      polyethylene glycol, 17 g, Daily PRN         sodium chloride 25 mL/hr (01/11/21 0047)    dexmedetomidine      niCARdipine 1 mg/hr (01/11/21 0600)    propofol 40 mcg/kg/min (01/11/21 0547)    sodium chloride 75 mL/hr at 01/11/21 0849    fentaNYL 5 mcg/ml in 0.9%  ml infusion Stopped (01/10/21 2030)            Stephan Jennings MD  Electronically signed 1/11/2021

## 2021-01-11 NOTE — PROGRESS NOTES
1200: This RN met OR in CT to retrieve patient for transport to ICU. observed EVD drain on left parietal not connected to drainage system. OR team stated Neuro team would connect to drainage system later. 1215: Returned from CT scan with patient. Assessment completed. Verified with Sven Nails with neuro that Left parietal EVD drain to be connected to [id number].     1230: L parietal EVD sterilely connected to drainage system. Leveled and zeroed at [address]. ICP noted to be 9.    1300: Dr. Kimberly Medeiros notified of post-op CT scan results. Increased midline shift to 6mm. No new orders. 1450: EVD drainage system remains empty. Dr. Kimberly Medeiros notified. Verified drain to be leveled at [address]. States to follow intensive care and stroke team recommendations at this time. Repeat CT scan in AM.      1700: Noted increase in SBP to 150's. Scheduled losartin given at this time. 1745: SBP remains in 150's Cardene restarted at this time. 1750: Discussed increase BP with Latrice Mclaughlin CNP. Orders for fentanyl gtt at this time. 1900: Repeat given to [address], Fentanyl started at this time.

## 2021-01-11 NOTE — PROGRESS NOTES
CRITICAL CARE PROGRESS NOTE      Patient:  Ana Rosa Bunch    Unit/Bed:4D-16/016-A  YOB: 1965  MRN: [id number]   PCP: Nadeem Bermeo MD  Date of Admission: 1/7/2021  Chief Complaint:-Intraparenchymal hemorrhage    Assessment and Plan:    1. Hemorrhagic stroke: S/p right craniotomy with evacuation of right intraparenchymal hematoma, placement of right decompressive cranial drain. INR 1.30. Yesterday, TEG-. Cardene gtt stopped. Levo started to target -160. Neuro checks, hypertonic saline 3% with sodium monitor every 2 hours, aggressive supportive care include treatment of temp, electrolytes, and blood pressure control. 2. Hypertensive emergency: Target SBP to be 140-160 with an A-line inserted in OR. Resolved. 3. CAD: Multivessel disease on PCI 1/9, CABG to be done after his neuro status stable. 4. DVT hx: chronically on coumadin, started on Lovenox 1/9. Stopped secondary to #2, SCD currently. INR 1.30.  5. Macrocytic anemia: Stable, monitor    6. Obesity: known   7. Hx CVA: previous CVA, no deficits   8. Requiring mechanical ventilation:  27/15, 40%. Sedation: Propofol 40.  9. Fluids: NS @75. NaCL 3% @ 25. Most recent Na was 145. Next check pending. To keep between 145-150. 10. DVT prophylaxis: Held secondary to head bleed. 11. GI prophylaxis: Pepcid, glycolax  12. Nutrition: NPO, nutrition consulted for early TF initiation  13. Lines: EVD at 5mmHg, calvarial drain to gravity, ETT, Macias, NG, RIJ CVC      INITIAL H AND P AND ICU COURSE:  Ana Rosa Bunch is a 60year old white male who presented to [address] [place] [address] on 1/7/2021 for chest pain 10/10 mid sternal radiates to jaw with SOB.  He has a past medical history DVT, hypertension, kidney stone, sciatica.  Per report patient woke up on the night of admission with 10 out of 10 chest pain.  Midsternal and crushing that radiated to the jaw with associated shortness of breath.  EMS was called and he was transferred to [facility].  Nitroglycerin did resolve chest pain.  He was admitted to stepdown.  Cardiology was consulted and patient did undergo stress test and DARINEL on 1/8.  On 1/9 he did undergo heart cath that did show multivessel CAD and CVS was consulted for possible plan for CABG.  Overnight code stroke was called.  When arriving a code stroke patient stated he had troubles moving his left side.  He did have facial droop.  He was very anxious.  Blood pressure was noted to be systolic greater than 632.  He was taken for emergency CT with plans on CTA of head and neck.  Upon completion of CT it was noted that patient had intercranial hemorrhage.  He was immediately brought to the ICU.  Stroke team did call with recommendations.  Dr. Rahul Ortega called as the neurosurgeon on-call. 1/10 - S/p right craniotomy with evacuation of right intraparenchymal hematoma, placement of right decompressive cranial drains with intracranial pressure monitor. Kept intubated with a neurochecks with stable hemodynamics. 1/11 -patient having small amount of drainage from decompressive cranial drain. ICPs about 6- 9 mmHg per monitor. Patient on 40 of propofol. Withdrawing to pain in all 4 extremities. Pupils 2 mm minimally reactive bilaterally. Will start levo to maintain -160. Past Medical History:    Past Medical History:   Diagnosis Date    Cerebral artery occlusion with cerebral infarction ([address]     Deep vein blood clot of left lower extremity (HCC)     Headache     Hyperlipidemia     Hypertension     Kidney stone 3/4/2019    Lymphedema of both lower extremities     wears lymphedema boots    Sciatica of left side       Family History:    Family History   Problem Relation Age of Onset    Diabetes Mother     Colon Cancer Father     Heart Disease Father     Heart Attack Father       Social History:     Former smoker, social alcohol    ROS Review of Systems   Unable to perform ROS: Intubated        Scheduled Meds:   sodium pressure. Currently intracranial pressure running between 9 and 12 mmHg. Continue with mechanical ventilator support. CC time 35 minutes. Time was discontiguous. Time does not include procedures. Time does include my direct assessment of the patient and coordination of care.   Electronically signed by Raghu Fuentes MD on 1/11/2021 at 5:34 PM

## 2021-01-11 NOTE — ANESTHESIA POSTPROCEDURE EVALUATION
Department of Anesthesiology  Postprocedure Note    Patient: Esther Cameron  MRN: [id number]  YOB: 1965  Date of evaluation: 1/11/2021  Time:  9:14 AM     Procedure Summary     Date: 01/10/21 Room / Location: [address] [facility] [place]    Anesthesia Start: 4714 Anesthesia Stop: 1100    Procedure: RIGHT CRANIOTOMY WITH EVACUATION OF RIGHT INTRAPARENCHYMAL HEMATOMA, PLACEMENT OF RIGHT DECOMPRESSIVE CRANIAL DRAINS (Right Head) Diagnosis: (hemorragic bleed)    Surgeons: Dennie Lo, MD Responsible Provider: Bandar Chapman MD    Anesthesia Type: general ASA Status: 5 - Emergent          Anesthesia Type: No value filed. Charisse Phase I: Charisse Score: 8    Charisse Phase II: Charisse Score: 8    Last vitals: Reviewed and per EMR flowsheets.        Anesthesia Post Evaluation    Patient location during evaluation: ICU  Patient participation: complete - patient cannot participate  Level of consciousness: sedated and ventilated  Airway patency: patent  Nausea & Vomiting: no nausea and no vomiting  Complications: no  Cardiovascular status: hemodynamically stable and vasoactive/inotropes  Respiratory status: ventilator and intubated  Hydration status: euvolemic

## 2021-01-11 NOTE — CARE COORDINATION
DISASTER CHARTING    1/11/21, 1:23 PM EST    DISCHARGE ONGOING EVALUATION:     DAINA WATERS day: 4  Location: -16/016-A Reason for admit: Chest pain [R07.9]     1/9 Cardiac Cath: Multivessel CAD  1/10 RIGHT CRANIOTOMY WITH EVACUATION OF RIGHT INTRAPARENCHYMAL HEMATOMA, PLACEMENT OF RIGHT DECOMPRESSIVE CRANIAL DRAINS  1/10 Intubated    Barriers to Discharge: Cardiac cath on 1/9 with multivessel disease; CVS consulted - needs OHS. Late in evening on 1/9, pt felt \"weird\", LUE weakness noted; code stroke called. Hypertensive. CT revealed acute hemorrhagic stroke; Neurosurgery consulted. STEMI, but unable to take back to cath lab d/t hemorrhagic stroke. Patient was taken to surgery early on 1/10 for craniotomy with evacuation of hematoma and placement of SD and EVD drains. Returned to ICU on vent. 3% saline started for edema. Q2H Na+ level. Cardene drip off earlier today, was started on levo, now levo off and Cardene restarted. Remains on vent w/ETT on PCV, peep 6, FIO2 30%, sats 97%. Tmax 100. NSR. Unable to follow commands; KIMBLE x4 to painful stim. Pupils equal, nonreactive. +corneal reflex, +cough, +gag. CVC. Ariana. OG w/TF started today. EVD @ 6 cm H20 above EAM. Parietal drain to gravity. Macias. SCDs. Cardene @ 11 mg/hr, IVF, 3% saline @ 25 ml/hr, IV ancef, pepcid, prn IV fentanyl, prn IV dilaudid, IV keppra, Electrolyte replacement protocols. Na+ 142, Ical 0.99, hgb 10.3, INR 1.3. Respiratory culture sent. PCP: Sujatha Cleaning MD  Patient Goals/Plan/Treatment Preferences: From home w/wife. Plan pending clinical course.

## 2021-01-11 NOTE — PROGRESS NOTES
Comprehensive Nutrition Assessment    Type and Reason for Visit:  Initial, Consult(TF)    Nutrition Recommendations/Plan:   Plan Vital 1.2 TF ( also low CHO)  with goal of 60 ml/hr. Free H20 flush per Dr.  When pt is extubated, recommend swallow evaluation as appropriate. Monitor labs including glucose,   Nutrition Assessment:    Pt. nutritionally compromised AEB NPO. At risk for further nutrition compromise r/t stroke, STEMI , ? Need for OHS, s/p craniotomy with drains 1/10/21 intubated and underlying medical condition (HTN, CVA, DVT, Lymphedema ). Nutrition recommendations/interventions as per above. Malnutrition Assessment:  Malnutrition Status: At risk for malnutrition (Comment)    Context:  Acute Illness     Findings of the 6 clinical characteristics of malnutrition:  Energy Intake:  Unable to assess  Weight Loss:  No significant weight loss     Body Fat Loss:  No significant body fat loss     Muscle Mass Loss:  No significant muscle mass loss    Fluid Accumulation:  1 - Mild     Strength:  Not Performed    Estimated Daily Nutrient Needs:  Energy (kcal):  ~1721 kcals ( 14); Weight Used for Energy Requirements:  Admission(122.9 kg)     Protein (g):  ~109 grams ( 1.3); Weight Used for Protein Requirements:  Ideal        Fluid (ml/day):  per Dr;          Nutrition Related Findings:  Pt seen intubated, head wrapped , s/p craniotomy  sedated with diprivan & per Michael Day RN plan to change to precedex, Discussed TF start with Michael Day RN . Per Michael Day RN pt needs OHS too and pt withdrawals to pain. MAP 84. meds include levophed, keppra & prn bm med.  Glucose 148, Hgb 10.3    Wounds:  Surgical Incision(pt s/p 1/10/21 Right-sided frontoparietal craniotomy and evacuation of large frontoparietal parenchymal hemorrhage)       Current Nutrition Therapies:    Current Tube Feeding (TF) Orders:  · Feeding Route: Orogastric  · Formula: Semi-Elemental(Vital 1.2)  · Schedule: Continuous(goal 60 ml/hr)  · Additives/Modulars: (.)  · Water Flushes: per Dr  · Current TF & Flush Orders Provides:  start at 20 ml/hr  · Goal TF & Flush Orders Provides: 1728 kcals, 108 grams protein, 159 grams CHO, 1168 ml H20 in 1440 ml TF/24 hours      Anthropometric Measures:  · Height: 6' 1\" (185.4 cm)  · Current Body Weight: 272 lb 11.3 oz (123.7 kg)(1/10 trace edema)   · Admission Body Weight: 270 lb 15.1 oz (122.9 kg)(1/8)    · Usual Body Weight: 270 lb 1 oz (122.5 kg)(11/6/20 per EMR)     · Ideal Body Weight: 184 lbs;   · BMI: 36  · BMI Categories: Obese Class 2 (BMI 35.0 -39.9)       Nutrition Diagnosis:   · Inadequate oral intake related to impaired respiratory function as evidenced by intubation      Nutrition Interventions:   Food and/or Nutrient Delivery:  Start Tube Feeding  Nutrition Education/Counseling:  No recommendation at this time   Coordination of Nutrition Care:  Continue to monitor while inpatient, Interdisciplinary Rounds    Goals:  TF to provide % of nutrient needs while pt is intubated       Nutrition Monitoring and Evaluation:     Food/Nutrient Intake Outcomes:  Enteral Nutrition Intake/Tolerance  Physical Signs/Symptoms Outcomes:  Biochemical Data, Chewing or Swallowing, GI Status, Fluid Status or Edema, Hemodynamic Status, Nutrition Focused Physical Findings, Skin, Weight     Discharge Planning:     Too soon to determine     Electronically signed by Erma Lopez RD, LD on 1/11/21 at 11:31 AM EST    Contact: [phone]

## 2021-01-11 NOTE — PROGRESS NOTES
1669- Dr. Anuja Keller at bedside. Updated and aware that ICP has been reading negative, stated that he did not expect the ICP to be accurate due to findings on CT scan, also - not to expect drainage out of the EVD drain. 0830- Dr. Dalila Carlisle and Dr. Cherelle Zhou at bedside, EVD moved to 6cm H2O per Dr. Anuja Keller. Also - for SBP goal less than 160, greater than 100.    1140- Updated Dr. Cherelle Zhou on patient current ICP's continuing to read negative, precedex off due to bradycardia, and ical results. Orders for calcium replacement protocol. 8408- Dr. Cherelle Zhou at bedside, updated on current vital signs and drip rates. No orders at this time. 200- Updated Dr. Cherelle Zhou on patient noted changed on tele monitor - order for EKG obtained. 1712- EKG results given to Dr. Cherelle Zhou - no further orders. 1925- Report given to [address] [place] [address] RN.

## 2021-01-12 NOTE — FLOWSHEET NOTE
Dr Emi Parsons notified of ICP of 24. Giving mannitol now. Instructed to leave drain at 0 level and continue to monitor after mannitol. Bill Salcido CNP and Dr Mary Ellen Muniz notified as well.

## 2021-01-12 NOTE — CARE COORDINATION
DISASTER CHARTING    1/12/21, 2:19 PM EST    DISCHARGE ONGOING EVALUATION:     DAINA WATERS day: 5  Location: -16/016-A Reason for admit: Chest pain [R07.9]     1/9 Cardiac Cath: Multivessel CAD  1/10 RIGHT CRANIOTOMY WITH EVACUATION OF RIGHT INTRAPARENCHYMAL HEMATOMA, PLACEMENT OF RIGHT DECOMPRESSIVE CRANIAL DRAINS  1/10 Intubated    Barriers to Discharge: POD #2. TPA to EVD drain today. Plan for EEG tomorrow. Remains on vent w/ETT on PCV, peep 6, FIO2 30%, sats 99%. Tmax 99.7. NSR. Unable to follow commands; no movement to painful stim. Pupils equal, nonreactive. +corneal reflex, +cough, +gag. CVC. Ariana. OG w/TF. EVD @ 6 cm H20 above EAM. Parietal drain to gravity. Macias. SCDs. Cardene @ 7.5 mg/hr, levo @ 1 mcg/min, versed drip, 3% saline @ 20 ml/hr, IV ancef, pepcid, prn IV fentanyl, prn IV dilaudid, SSI, IV keppra, mannitol Q8H, Electrolyte replacement protocols. Hgb 10.3, INR 1.17. PCP: No primary care provider on file. Patient Goals/Plan/Treatment Preferences: From home with wife. Plan pending clinical course.

## 2021-01-12 NOTE — FLOWSHEET NOTE
Daughter, [facility] updated on status change and CT/EEg being done. Will update again later when tests are completed.

## 2021-01-12 NOTE — CONSULTS
NEUROLOGY CONSULT NOTE      Requesting Physician: Lindy Hamilton MD    Reason for Consult:  Evaluate for ICH, abnormal EEG. History of Present Illness:  Zachary Dasilva is a 60 y.o. male admitted to [address] [place] [address] on 1/7/2021. He presented to [facility] on 1/7/2021 for chest pain 10/10 mid sternal radiates to jaw with SOB. He has a past medical history DVT, hypertension, kidney stone, sciatica. Per report patient woke up on the night of admission with 10 out of 10 chest pain. Midsternal and crushing that radiated to the jaw with associated shortness of breath. EMS was called and he was transferred to [facility]. Nitroglycerin did resolve chest pain. He was admitted to stepdown. Cardiology was consulted and patient did undergo stress test and DARINEL on 1/8. On 1/9 he did undergo heart cath that did show multivessel CAD and [facility] was consulted for possible plan for CABG. Overnight code stroke was called. When arriving a code stroke patient stated he had troubles moving his left side. He did have facial droop. He was very anxious. Blood pressure was noted to be systolic greater than 414. He was taken for emergency CT with plans on CTA of head and neck. Upon completion of CT it was noted that patient had intercranial hemorrhage. He was immediately brought to the ICU. Stroke team did call with recommendations. Dr. Jose Alejandro Leiva was called as the neurosurgeon on-call. On 1/10 - S/p right craniotomy with evacuation of right intraparenchymal hematoma, placement of right decompressive cranial drains with intracranial pressure monitor. He underwent EEG on 1-12-21 that showed abnormal EEG due to presence of occasional left frontal sharp waves that are of questionable significance though they may be epileptogenic in nature, indicates seizure tendency in the proper clinical context.   In addition, slowing is seen over the right hemisphere suggestive of an area of focal cortical dysfunction, probably injection 1 mg, PRN      dextrose 5 % solution, PRN      potassium chloride 20 mEq/50 mL IVPB (Central Line), PRN      hydrALAZINE (APRESOLINE) injection 10 mg, Q6H PRN      promethazine (PHENERGAN) injection 6.25 mg, Q6H PRN      niCARdipine (CARDENE) 25 mg in dextrose 5 % 250 mL infusion, Continuous      propofol injection, Titrated      sodium chloride flush 0.9 % injection 10 mL, 2 times per day      sodium chloride flush 0.9 % injection 10 mL, PRN      promethazine (PHENERGAN) tablet 12.5 mg, Q6H PRN    Or      ondansetron (ZOFRAN) injection 4 mg, Q6H PRN      HYDROcodone-acetaminophen (NORCO) 5-325 MG per tablet 1 tablet, Q6H PRN      HYDROmorphone (DILAUDID) injection 0.5 mg, Q3H PRN      [Held by provider] losartan (COZAAR) tablet 100 mg, Daily    And      [Held by provider] hydroCHLOROthiazide (HYDRODIURIL) tablet 12.5 mg, Daily      fentaNYL 5 mcg/ml in 0.9%  ml infusion, Continuous      levETIRAcetam (KEPPRA) 1,000 mg in sodium chloride 0.9 % 100 mL IVPB, Q12H      sodium chloride flush 0.9 % injection 10 mL, 2 times per day      sodium chloride flush 0.9 % injection 10 mL, PRN      metoprolol tartrate (LOPRESSOR) tablet 25 mg, BID      polyethylene glycol (GLYCOLAX) packet 17 g, Daily PRN         Social History:  Social History     Tobacco Use   Smoking Status Former Smoker    Types: Cigarettes    Quit date: 2007    Years since quittin.4   Smokeless Tobacco Never Used     Social History     Substance and Sexual Activity   Alcohol Use Not Currently    Comment: Rare     Social History     Substance and Sexual Activity   Drug Use No         Family History:       Problem Relation Age of Onset    Diabetes Mother     Colon Cancer Father     Heart Disease Father     Heart Attack Father        Review of Systems:  Unobtainable due to patient condition. I reviewed the patient PMH,medications, family history, social history.     Physical Exam:  /79   Pulse 82   Temp 100.6 °F (38.1 °C) (Bladder)   Resp 15   Ht 6' 1\" (1.854 m)   Wt 285 lb 7.9 oz (129.5 kg)   SpO2 99%   BMI 37.67 kg/m²  I Body mass index is 37.67 kg/m². I   Wt Readings from Last 1 Encounters:   01/12/21 285 lb 7.9 oz (129.5 kg)           General appearance - The patient is obtuded, sedated. No abnormal movement. Coughs with suctioning. Breathing at the vent. He has cranial drain in place, on the vent, sedated with IV fluids running. Mental status -unable to assess due to condition. Mood is unable to assess. Neck - supple, no significant adenopathy, carotids upstroke normal bilaterally,There is no limitation of ROM of the neck. There is no LAP in the neck. There is no thyroid enlargement. Neurological -   Cranial Pirocw-VL-TNC:   Cranial nerve II: unable to assess. Cranial nerve III: Pupils:  3  mm  round, sluggish   Cranial nerves III, IV, VI: VOR are present  Cranial nerve V: Facial sensation: corneal present   Cranial nerve VII:Facial strength: symmetric grossly  Cranial nerve VIII: unable to assess. Cranial nerve IX: Palate Elevation unable to assess. Cranial nerve XI: Shoulder shrug unable to assess. Cranial nerve XII: Tongue movement:  unable to test.  No movement disorder noted  neck is supple on exam  DTR's are decreased distal and symmetric  Babinski sign is equivocal on bilaterally. Romberg unable to perform  Motor exam is does not move any of his 4 extremities to pain, . Normal muscle tone . There is no muscle atrophy. There is no muscle fasciculation   Sensory is intact for does not localize to pain . Coordination: unable to assess. Gait and station: unable to assess. Abnormal movement none. vibration proprioception unable to assess. Skin - no rashes or lesions, bruises   Superficial temporal artery pulses are normal.   Musculoskeletal:  Has no hand arthritis, no limitation of ROM in any of the four Extremities. no joint tenderness, deformity or swelling.   There is +1 leg edema. The Heart was regular in rate and rhythm. No heart murmur  Chest decreased equal air entry. Abdomen: Soft, obese       Labs:    CBC:   Recent Labs     01/10/21  0515 01/11/21 0430 01/12/21 0522   WBC 12.2* 9.0 9.8   HGB 14.2 10.3* 10.3*    152 146   MCV 94.1* 97.8* 98.1*   MCH 32.2 32.2 32.3   MCHC 34.2 32.9 32.9     CMP:  Recent Labs     01/11/21  0435 01/11/21  0435 01/11/21  2115 01/11/21  2115 01/12/21  0154 01/12/21  0154 01/12/21  0522 01/12/21  0522 01/12/21  1030 01/12/21  1250 01/12/21  1450      < > 139   < >  --    < > 140   < > 141 141 143   K 3.7  --  3.3*  --  3.7  --  3.5  --   --   --   --    *  --  110  --   --   --  109  --   --   --   --    CO2 23  --  22*  --   --   --  23  --   --   --   --    BUN 14  --  12  --   --   --  12  --   --   --   --    CREATININE 1.0  --  1.0  --   --   --  0.8  --   --   --   --    LABGLOM 77*  --  77*  --   --   --  >90  --   --   --   --    GLUCOSE 148*  --  193*  --   --   --  186*  --   --   --   --    CALCIUM 7.7*  --  8.1*  --   --   --  8.5  --   --   --   --     < > = values in this interval not displayed. Results for orders placed during the hospital encounter of 05/22/20   CTA HEAD W WO CONTRAST    Narrative PROCEDURE: CTA HEAD W WO CONTRAST, CTA NECK W WO CONTRAST    CLINICAL INFORMATION: thunderclap headache, dizziness, nausea, on coumadin. COMPARISON: Head CT and brain MRI 5/22/2020. TECHNIQUE: 1 mm axial images were obtained through the head and neck after the fast bolus administration of contrast. A noncontrast localizer was obtained. 3-D reconstructions were performed on a dedicated 3-D workstation. These include multiplanar MPR   images and multiplanar MIP images. Centerline reconstructions were obtained of the carotid systems. Isovue intravenous contrast was given.     All CT scans at this facility use dose modulation, iterative reconstruction, and/or weight-based dosing when appropriate to reduce radiation dose to as low as reasonably achievable. FINDINGS:      CTA NECK:    Aortic arch and branches: Aortic arch is within acceptable limits. There is no stenosis at the common origin of innominate artery left common carotid artery. There is no stenosis of either subclavian artery. Right common carotid artery/ICA: The right common carotid artery is normal. There is minimal atherosclerosis of the right carotid bulb. There is no stenosis. The right internal carotid artery is tortuous and redundant. There is no stenosis. Left common carotid artery/ICA: The left common carotid artery is normal. There is minimal atherosclerosis of the left carotid bulb. There is no stenosis. The left internal carotid artery is tortuous and redundant. There is no stenosis. Vertebral arteries: The vertebral arteries are codominant and normal.        CTA HEAD:      Internal carotid arteries: There is mild calcified atherosclerosis of the cavernous segments of the internal carotid arteries bilaterally. There is no stenosis. Middle cerebral arteries: Normal. The proximal branches are also normal.    Anterior cerebral arteries: Normal. The proximal branches are normal. There is a normal small anterior communicating artery. Vertebral arteries: The vertebral arteries are normal.    Basilar artery: Normal.    Superior cerebellar arteries: Normal.    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 The P2 segment is normal. There is a normal left posterior communicating artery. The right posterior cerebral artery is normal.        No aneurysms, stenoses or occlusions are noted. The superior sagittal sinus, vein of Abad, internal cerebral veins, straight sinus, transverse sinuses and sigmoid sinuses are patent. Axial source data: There are old healed right upper rib fractures. There are no suspicious findings the lung apices. There is no cervical adenopathy.  There is a small old infarct in the left frontal lobe and inferior left basal ganglia. The paranasal   sinuses are normally aerated. Impression    1. No evidence of stenosis of either carotid bulb or internal carotid artery. 2. No intracranial stenoses or occlusions. No aneurysms. **This report has been created using voice recognition software. It may contain minor errors which are inherent in voice recognition technology. **    Final report electronically signed by Dr. Severo Reilly on 5/22/2020 4:00 PM     Results for orders placed during the hospital encounter of 05/22/20   CTA NECK W WO CONTRAST    Narrative PROCEDURE: CTA HEAD W [address] INFORMATION: thunderclap headache, dizziness, nausea, on coumadin. COMPARISON: Head CT and brain MRI 5/22/2020. TECHNIQUE: 1 mm axial images were obtained through the head and neck after the fast bolus administration of contrast. A noncontrast localizer was obtained. 3-D reconstructions were performed on a dedicated 3-D workstation. These include multiplanar MPR   images and multiplanar MIP images. Centerline reconstructions were obtained of the carotid systems. Isovue intravenous contrast was given. All CT scans at this facility use dose modulation, iterative reconstruction, and/or weight-based dosing when appropriate to reduce radiation dose to as low as reasonably achievable. FINDINGS:      CTA NECK:    Aortic arch and branches: Aortic arch is within acceptable limits. There is no stenosis at the common origin of innominate artery left common carotid artery. There is no stenosis of either subclavian artery. Right common carotid artery/ICA: The right common carotid artery is normal. There is minimal atherosclerosis of the right carotid bulb. There is no stenosis. The right internal carotid artery is tortuous and redundant. There is no stenosis. Left common carotid artery/ICA:  The left common carotid artery is normal. There is minimal atherosclerosis of the left carotid bulb. There is no stenosis. The left internal carotid artery is tortuous and redundant. There is no stenosis. Vertebral arteries: The vertebral arteries are codominant and normal.        CTA HEAD:      Internal carotid arteries: There is mild calcified atherosclerosis of the cavernous segments of the internal carotid arteries bilaterally. There is no stenosis. Middle cerebral arteries: Normal. The proximal branches are also normal.    Anterior cerebral arteries: Normal. The proximal branches are normal. There is a normal small anterior communicating artery. Vertebral arteries: The vertebral arteries are normal.    Basilar artery: Normal.    Superior cerebellar arteries: Normal.    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 The P2 segment is normal. There is a normal left posterior communicating artery. The right posterior cerebral artery is normal.        No aneurysms, stenoses or occlusions are noted. The superior sagittal sinus, vein of Abad, internal cerebral veins, straight sinus, transverse sinuses and sigmoid sinuses are patent. Axial source data: There are old healed right upper rib fractures. There are no suspicious findings the lung apices. There is no cervical adenopathy. There is a small old infarct in the left frontal lobe and inferior left basal ganglia. The paranasal   sinuses are normally aerated. Impression    1. No evidence of stenosis of either carotid bulb or internal carotid artery. 2. No intracranial stenoses or occlusions. No aneurysms. **This report has been created using voice recognition software. It may contain minor errors which are inherent in voice recognition technology. **    Final report electronically signed by Dr. Ayde Mckeon on 5/22/2020 4:00 PM   Reviewed   Results for orders placed during the hospital encounter of 05/22/20   MRI Brain WO Contrast    Narrative PROCEDURE: MRI BRAIN WO CONTRAST    CLINICAL INFORMATION thunderclap headache, dizziness, nausea, on coumadin. Sudden onset headache. Dizziness and nausea since this morning. COMPARISON: Head CT 5/22/2020. TECHNIQUE: Multiplanar and multiple spin echo MRI images were obtained of the brain without contrast.    FINDINGS:        The diffusion-weighted images are normal.  The brain volume is normal. There is an old lacunar type infarct in the head of the caudate nucleus on the left. This is noted inferiorly. There are few small scattered foci in the corona radiata on the left. There is a small old cortical infarct in the left frontal lobe. There is a very small old cortical infarct in the left occipital lobe. There is no abnormal signal in the right cerebral hemisphere. The cerebellum and cerebellopontine angles are within   acceptable limits. There are no intra-or extra-axial collections. There is no hydrocephalus, midline shift or mass effect. There is no susceptibility artifact in the brain. The major intracranial vascular flow voids are present. The midline craniocervical junction structures are normal.  The pituitary gland and brainstem are normal.            Impression    1. No evidence of an acute infarct. 2. Small old infarcts in the left cerebral hemisphere. These are in the left frontal lobe, left occipital lobe and head of the caudate nucleus on the left. **This report has been created using voice recognition software. It may contain minor errors which are inherent in voice recognition technology. **    Final report electronically signed by Dr. Enrike Taylor on 5/22/2020 2:35 PM      I reviewed the CT brain and agree with interpretation. Results for orders placed during the hospital encounter of 01/07/21   CT HEAD WO CONTRAST    Narrative PROCEDURE: CT HEAD WO CONTRAST    CLINICAL INFORMATION: intrap bleed.     COMPARISON: 1/10/2021 TECHNIQUE: Noncontrast 5 mm axial images were obtained through the brain. All CT scans at this facility use dose modulation, iterative reconstruction, and/or weight-based dosing when appropriate to reduce radiation dose to as low as reasonably achievable. FINDINGS:  Endotracheal and OG catheter. Recent right-sided craniotomy. Ventricular drain is in place. Tip of the drain is in the frontal horn of the right lateral ventricle. There is pneumocephalus along the surgical site greatest along the right frontal convexity. Patient has recently undergone a surgical evacuation of intraparenchymal hematoma in the right frontoparietal region. Redemonstrated is subarachnoid blood greatest in the high right frontal parietal lobe sulci. Edema and hypoattenuation right frontal   parietal lobe in keeping with evolving infarction. Some packing material at the craniotomy site is also present. There is moderate amount of intraventricular blood with right to left shift of 7 mm. This is similar to prior study. Blood extending into the fourth ventricle. Temporal horns are prominent similar to prior study secondary to mass effect. Right scalp hematoma and postsurgical change. Partially opacified paranasal sinuses. Impression  IMPRESSION:    1. No significant interval change in postoperative changes with pneumocephalus and subarachnoid blood in the right frontal parietal lobe towards the vertex. 2. Moderate amount of intraventricular hemorrhage with dilatation of the temporal horns similar in appearance to prior study. There is a 7 mm right to left midline shift, similar to prior. 3. Evolving right frontal parietal infarction. Continued close follow-up is advised. **This report has been created using voice recognition software. It may contain minor errors which are inherent in voice recognition technology. **    Final report electronically signed by Dr. Amy Hester on 1/12/2021 12:31 PM         We reviewed the patient records and available information in the EHR service. 8. Family updated at bedside. 9. Please call if any questions.             Electronically signed by Diony Jackson MD on 1/12/2021 at 5:05 PM

## 2021-01-12 NOTE — PROCEDURES
[address] [place] [address]                          ELECTROENCEPHALOGRAM REPORT    PATIENT NAME: Caesar Waterman                       :        1965  MED REC NO:   [id number]                           ROOM:       [id number]  ACCOUNT NO:   [de-identified]                           ADMIT DATE: 2021  PROVIDER:     Chantal Horne. Ilda Aldana MD    DATE OF EE2021    REFERRING PROVIDER:  Harini Dominguez CNP    CLINICAL HISTORY:  A 57-year-old male presenting with hemorrhagic  stroke, status post right craniotomy with evacuation of right  intraparenchymal hematoma. The patient is on the vent, off Versed  today, evaluate for seizure. The patient is comatose. Medications  listed are mannitol, cefazolin, famotidine, atorvastatin, insulin,  Keppra, sodium chloride, metoprolol, midazolam, norepinephrine,  dextrose, nicardipine, propofol, fentanyl. CLINICAL INTERPRETATION:  This is a 17-channel EEG performed without  sleep deprivation. Hyperventilation was not performed. Photic  stimulation was not performed. The patient is described as comatose. The background is noted to be slowed, poorly organized. Slowing is  noted over the right hemisphere suggestive of an area of focal cortical  dysfunction. This may be related to the recent right intraparenchymal  hematoma though clinical correlation is recommended. The background is  noted to be slowed and disorganized. There was no definitive evidence  of epileptiform activity. No clinical seizures were observed. Occasional left frontal single sharp waves were noted that are of  questionable significance though they may be epileptogenic in nature,  indicates seizure tendency. Photic stimulation was not performed.     IMPRESSION:  This is an abnormal EEG due to presence of occasional left  frontal sharp waves that are of questionable significance though they may be epileptogenic in nature, indicates seizure tendency in the proper  clinical context. In addition, slowing is seen over the right  hemisphere suggestive of an area of focal cortical dysfunction, probably  related to the reported right hemisphere intraparenchymal hematoma  though clinical correlation is necessary. No clinical seizures were  observed.         Gertha Hamman, MD    D: 01/12/2021 15:48:27       T: 01/12/2021 15:54:33     [email or web address]  Job#: [id number]     Doc#: [id number]    CC:

## 2021-01-12 NOTE — PROGRESS NOTES
CRITICAL CARE PROGRESS NOTE      Patient:  Rip Briscoe    Unit/Bed:4D-16/016-A  YOB: 1965  MRN: [id number]   PCP: No primary care provider on file. Date of Admission: 1/7/2021  Chief Complaint:-Intraparenchymal hemorrhage    Assessment and Plan:    1. Hemorrhagic stroke: S/p right craniotomy with evacuation of right intraparenchymal hematoma, placement of right decompressive cranial drain. INR 1.30. Yesterday, TEG-. Cardene gtt stopped. Levo started to target -160. Neuro checks, hypertonic saline 3% with sodium monitor every 2 hours, aggressive supportive care include treatment of temp, electrolytes, and blood pressure control. 2. Hypertensive emergency: Target SBP to be 140-160 with an A-line inserted in OR. Currently on Levo 15.   3. CAD: Multivessel disease on PCI 1/9, CABG to be done after his neuro status stable. 4. DVT hx: chronically on coumadin, started on Lovenox 1/9. Stopped secondary to , SCD currently. INR 1.30.  5. Macrocytic anemia: Stable, monitor    6. Hx CVA: previous CVA, no deficits, according to daughter PFO   7. Requiring mechanical ventilation:  24/12, 25%. Sedation: Versed 2mg/hr. 8. Fluids: NS @75. NaCL 3% @ 30. Most recent Na was 140. To keep between 145-150.  9. DVT prophylaxis: Held secondary to head bleed. 10. GI prophylaxis: Pepcid, glycolax  11. Nutrition: NPO, nutrition consulted for early TF initiation  12.  Lines: EVD at 5mmHg, calvarial drain to gravity, ETT, Macias, NG, RIJ CVC      INITIAL H AND P AND ICU COURSE:  Rip Briscoe is a 60year old white male who presented to [facility] on 1/7/2021 for chest pain 10/10 mid sternal radiates to jaw with SOB.  He has a past medical history DVT, hypertension, kidney stone, sciatica.  Per report patient woke up on the night of admission with 10 out of 10 chest pain.  Midsternal and crushing that radiated to the jaw with associated shortness of breath.  EMS was called and he was transferred to [facility] [address]  Nitroglycerin did resolve chest pain.  He was admitted to stepdown.  Cardiology was consulted and patient did undergo stress test and DARINEL on 1/8.  On 1/9 he did undergo heart cath that did show multivessel CAD and [facility] was consulted for possible plan for CABG.  Overnight code stroke was called.  When arriving a code stroke patient stated he had troubles moving his left side.  He did have facial droop.  He was very anxious.  Blood pressure was noted to be systolic greater than 891.  He was taken for emergency CT with plans on CTA of head and neck.  Upon completion of CT it was noted that patient had intercranial hemorrhage.  He was immediately brought to the ICU.  Stroke team did call with recommendations.  Dr. Rocio Banks called as the neurosurgeon on-call. 1/10 - S/p right craniotomy with evacuation of right intraparenchymal hematoma, placement of right decompressive cranial drains with intracranial pressure monitor. Kept intubated with a neurochecks with stable hemodynamics. 1/11 -patient having small amount of drainage from decompressive cranial drain. ICPs about 6- 9 mmHg per monitor. Patient on 40 of propofol. Withdrawing to pain in all 4 extremities. Pupils 2 mm minimally reactive bilaterally. Will start levo to maintain -160.    1/12 - Pt intubated, sedated on 2mg/h of Versed. Withdrawing from pain x 4 ext. Pupils 3mm BL, minimally reactive, sluggish. Corneal and cough reflex intact. Blood pressure well controlled with nicardipine gtt.      Past Medical History:    Past Medical History:   Diagnosis Date    Cerebral artery occlusion with cerebral infarction ([address]     Deep vein blood clot of left lower extremity (HCC)     Headache     Hyperlipidemia     Hypertension     Kidney stone 3/4/2019    Lymphedema of both lower extremities     wears lymphedema boots    Sciatica of left side       Family History:    Family History   Problem Relation Age of Onset    Diabetes Mother  Colon Cancer Father     Heart Disease Father     Heart Attack Father       Social History: Former smoker, social alcohol    ROS Review of Systems   Unable to perform ROS: Intubated        Scheduled Meds:   famotidine  20 mg Oral BID    calcium replacement protocol   Other RX Placeholder    atorvastatin  40 mg Per NG tube Nightly    insulin lispro  0-6 Units Subcutaneous Q6H    sodium chloride flush  10 mL Intravenous 2 times per day    ceFAZolin (ANCEF) IVPB  3 g Intravenous Q8H    [Held by provider] losartan  100 mg Oral Daily    And    [Held by provider] hydroCHLOROthiazide  12.5 mg Oral Daily    levetiracetam  1,000 mg Intravenous Q12H    sodium chloride flush  10 mL Intravenous 2 times per day    metoprolol tartrate  25 mg Oral BID     Continuous Infusions:   sodium chloride 30 mL/hr (01/12/21 0309)    norepinephrine Stopped (01/11/21 1316)    midazolam 2 mg/hr (01/12/21 0506)    dextrose      niCARdipine 15 mg/hr (01/12/21 0445)    propofol Stopped (01/11/21 1327)    fentaNYL 5 mcg/ml in 0.9%  ml infusion Stopped (01/10/21 2030)       PHYSICAL EXAMINATION:  Vitals:    01/12/21 0357 01/12/21 0400 01/12/21 0444 01/12/21 0500   BP: (!) 161/61 118/72  116/77   Pulse:  75 88 82   Resp:  25 26 23   Temp:  99.7 °F (37.6 °C)     TempSrc:  Bladder     SpO2:  97% 97% 96%   Weight:       Height:            In: 5203.1   Out: 1578 [Urine:1550; Drains:28]     Body mass index is 38.36 kg/m². GCS:   No data recorded     Ventilator Settings:   PCV   PIP: 24  PEEP: 6  Rate: 26  FiO2: 25%        Physical Exam  HENT:      Head:      Comments: EVD and decompressive drains. Right Ear: External ear normal.      Left Ear: External ear normal.      Nose: Nose normal.      Mouth/Throat:      Mouth: Mucous membranes are moist.      Pharynx: Oropharynx is clear. Eyes:      Conjunctiva/sclera: Conjunctivae normal.      Pupils: Pupils are equal.      Right eye: Pupil is sluggish.       Left eye: Pupil is sluggish. Comments: Pupils 3 mm bilaterally   Neck:      Musculoskeletal: No neck rigidity. Cardiovascular:      Rate and Rhythm: Normal rate and regular rhythm. Pulses: Normal pulses. Heart sounds: Normal heart sounds. No murmur. No gallop. Pulmonary:      Effort: Pulmonary effort is normal. No respiratory distress. Breath sounds: Normal breath sounds. No wheezing, rhonchi or rales. Abdominal:      General: Abdomen is flat. Bowel sounds are normal. There is no distension. Palpations: Abdomen is soft. Musculoskeletal:      Right lower leg: No edema. Left lower leg: No edema. Lymphadenopathy:      Cervical: No cervical adenopathy. Skin:     General: Skin is warm and dry. Capillary Refill: Capillary refill takes less than 2 seconds. Coloration: Skin is not jaundiced. Findings: No bruising or rash. Neurological:      GCS: GCS eye subscore is 1. GCS verbal subscore is 1. GCS motor subscore is 4. Comments: Withdrawing to pain all 4 extremities. Breathing over vent. Data Review: (All radiographs, tracings, PFTs, and imaging are personally viewed and interpreted unless otherwise noted).  Sodium 140, potassium 3.5 chloride 109, CO2 23, BUN12, creatinine 0.8, anion gap 8.0, glucose 186,  calcium 8.5   WBC 9.8, hemoglobin 10.3, hematocrit 31.3, platelet count 532   Telemetry shows normal sinus rhythm    INR 1.30.  ACT yesterday elevated at 128.  Bio fire negative.  CT head vasogenic edema, intraparenchymal hemorrhage, intraventricular hemorrhage. Improvement after evacuation. Moderate pneumocephalus.  Right carotid 50% stenosis.  CTA chest noncon - Mild atelectatic/fibrotic stranding posterior aspect right upper lobe   CXR yesterday unremarkable              Seen with multidisciplinary ICU team yes.   Meets Continued ICU Level Care Criteria:    [x] Yes   [] No - Transfer Planned to listed location:  [] HOSPITALIST CONTACTED-      Case and plan discussed with Dr. Yadira Burton      Electronically signed by Tamara Mirza MD on 1/12/2021 at 5:30 AM    Patient seen by me. Case discussed with resident physician. Case discussed with Dr. Lor Rivers. Patient with no drainage from ventriculostomy tube. Will administer 1mg tPA via catheter to permit drainage. Suspect clot precluding drainage. Mild tachypnea with air trapping. .  CC time 35 minutes. Time was discontiguous. Time does not include procedures. Time does include my direct assessment of the patient and coordination of care.   Electronically signed by Raghu Fuentes MD on 1/12/2021 at 8:28 AM

## 2021-01-12 NOTE — PROGRESS NOTES
[address]  NEUROSURGERY PROGRESS NOTE    Salvador James   YOB: 1965  Account Number: [de-identified]  Contact Serial Number:  [id number]   Date of Examination: 1/12/2021      ASSESSMENT:      - Post op day 2 (right-sided craniotomy and evacuation of right-sided intraparenchymal bleed). \  -Postop day 2 (right-sided EVD placement). -This is a 75-year-old male who developed an acute significant intraparenchymal and intraventricular system bleed in the context of treatment of advanced cardio vascular disease.  -Patient is still critically ill.  -Prognosis is still poor in general.  Expected long recovery course from neurosurgical perspective if patient survives. PLAN:      -General Postop care:    · Cont. Current medical management per ICU team.  · 3% hypertonic saline. · Seizures precaution ( keppra 1g q12)  · EEG tomorrow  · Keep systolic blood pressure less than 160 and above 100. · Follow up the cardiology and stroke teams recommendation. · Posterior drain to gravity. · New brain CT tomorrow. -EVD management:     · Keep the EVD at 5-6 centimeters at this time. · Transduce the EVD every 1 hour and report the ICP. · We will inject 1 mg of tPA in the EVD catheter today. -ICP management:    ·  Keep the ICP less than  20 mm HG  · Keep systolic blood pressure more than 100  mm HG  · Keep Cerebral perfusion pressure (CPP)  more than 60 mm HG  · Keep blood glucose from  mg    -The case was discussed in detail with the ICU team, with cardiovascular surgery and patient nurse. * Time spent was at least 35 min of critical time of evaluating patient, discussion with staff, planning for treatment and evaluation. The time was spent examining patient face to face, reviewing the images personally, reviewing the chart, perform high complexity decision making and speaking with the nursing staff regarding recommendations.     Salvador James is a 60 y.o. male, admitted on :1/7/2021  1:13 AM    SUBJECTIVE:      Patient is a still intubated and sedated. Withdrawal to pain in all his extremities per his nurse when he is off sedation. OBJECTIVE:      No significant changes in patient neuro exam compared with yesterday  Patient is still intubated and sedated. GCS 3T  Pupils equal and reactive. Cough reflex  Has corneal reflex. VITALS:    Vitals:    01/12/21 0500 01/12/21 0600 01/12/21 0700 01/12/21 0800   BP: 116/77 109/69 114/68    Pulse: 82 82 84    Resp: 23 23 24    Temp:  99.7 °F (37.6 °C) 99.5 °F (37.5 °C) 99.5 °F (37.5 °C)   TempSrc:    Bladder   SpO2: 96% 95% 95%    Weight:    285 lb 7.9 oz (129.5 kg)   Height:           RADIOLOGY:  Pertinent images have been reviewed. Post op brain CT  :    1.  Vasogenic edema with loss of gray-white matter differentiation within    the right superior frontal lobe appears more confluent than on prior    imaging, consistent with developing infarct. 2.  Decreased pneumocephalus. 3.  Stable right to left midline shift by 6 mm.  Stable interventricular,    intraparenchymal and subarachnoid hemorrhage. 4.  Interval increase in scalp edema/hematoma overlying the superior    frontal calvarium.          PROBLEM LIST:  Patient Active Problem List   Diagnosis    Cellulitis    Rash    Chronic anticoagulation    Presence of IVC filter    Kidney stone    Acute midline low back pain with right-sided sciatica    Low back pain radiating to right lower extremity    Sciatica of left side    Thunderclap headache    Essential hypertension    Anticoagulated    Hx of pulmonary embolus    History of DVT (deep vein thrombosis)    Migraine    Constipation    Chronic acquired lymphedema    Chest pain, moderate coronary artery risk    Acute coronary syndromes (HCC)    Intraparenchymal hemorrhage of brain (HCC)    S/P evacuation of hematoma    Acute respiratory failure (HCC)    Intraventricular hemorrhage (HCC)       MEDICATIONS: Prior to Admission medications    Medication Sig Start Date End Date Taking?  Authorizing Provider   metoprolol succinate (TOPROL XL) 50 MG extended release tablet Take 50 mg by mouth daily   Yes Historical Provider, MD   olmesartan-hydroCHLOROthiazide (BENICAR HCT) 40-12.5 MG per tablet Take 1 tablet by mouth daily   Yes Historical Provider, MD   warfarin (COUMADIN) 4 MG tablet Take 4 mg by mouth daily Managed by Dr Kiet Mcdonough   Yes Historical Provider, MD   Aspirin-Acetaminophen-Caffeine (EXCEDRIN PO) Take 1 tablet by mouth as needed    Historical Provider, MD   naproxen sodium (ALEVE) 220 MG tablet Take 550 mg by mouth as needed for Pain    Historical Provider, MD   Multiple Vitamins-Minerals (THERAPEUTIC MULTIVITAMIN-MINERALS) tablet Take 1 tablet by mouth daily    Historical Provider, MD   Cholecalciferol (VITAMIN D) 2000 units CAPS capsule Take 2 capsules by mouth daily    Historical Provider, MD       Current Facility-Administered Medications   Medication Dose Route Frequency Provider Last Rate Last Admin    sodium chloride 3 % solution  30 mL/hr Intravenous Continuous JASMYNE Tomlinson - CNP 30 mL/hr at 01/12/21 0309 30 mL/hr at 01/12/21 0309    alteplase (CATHFLO) injection 1 mg  1 mg Intracatheter Once JASMYNE Wing - CNP        famotidine (PEPCID) tablet 20 mg  20 mg Oral BID Etta Godinez MD   20 mg at 01/11/21 2011    norepinephrine (LEVOPHED) 16 mg in dextrose 5% 250 mL infusion  2 mcg/min Intravenous Continuous Etta Godinez MD   Stopped at 01/11/21 1316    calcium replacement protocol   Other RX Placeholder Etta Godinez MD        fentaNYL (SUBLIMAZE) injection 50 mcg  50 mcg Intravenous Q2H PRN Etta Godinez MD   50 mcg at 01/12/21 0500    midazolam (VERSED) 100 mg in dextrose 5 % 100 mL infusion  1 mg/hr Intravenous Continuous Etta Godinez MD 1 mL/hr at 01/12/21 0808 1 mg/hr at 01/12/21 0808    atorvastatin (LIPITOR) tablet 40 mg  40 mg Per NG tube Nightly JASMYNE Willis - CNP        insulin lispro (HUMALOG) injection vial 0-6 Units  0-6 Units Subcutaneous Q6H Madeline Suarez, APRN - CNP   1 Units at 01/12/21 0423    glucose (GLUTOSE) 40 % oral gel 15 g  15 g Oral PRN Madeline Suarez, APRN - CNP        dextrose 50 % IV solution  12.5 g Intravenous PRN Madeline Suarez, JASMYNE - CNP        glucagon (rDNA) injection 1 mg  1 mg Intramuscular PRN Madeline Suarez, APRN - CNP        dextrose 5 % solution  100 mL/hr Intravenous PRN Madeline Suarez, APRN - CNP        potassium chloride 20 mEq/50 mL IVPB (Central Line)  20 mEq Intravenous PRN Madeline Suarez APRN - CNP   Stopped at 01/12/21 0038    hydrALAZINE (APRESOLINE) injection 10 mg  10 mg Intravenous Q6H [facility]N [facility]   10 mg at 01/12/21 0357    promethazine (PHENERGAN) injection 6.25 mg  6.25 mg Intramuscular Q6H PRN [facility]        niCARdipine (CARDENE) 25 mg in dextrose 5 % 250 mL infusion  5 mg/hr Intravenous Continuous Afshin Nieves  mL/hr at 01/12/21 0833 15 mg/hr at 01/12/21 7677    propofol injection  10 mcg/kg/min Intravenous Titrated [facility]   Stopped at 01/11/21 1327    sodium chloride flush 0.9 % injection 10 mL  10 mL Intravenous 2 times per day [facility]   10 mL at 01/11/21 0815    sodium chloride flush 0.9 % injection 10 mL  10 mL Intravenous PRN [facility]        promethazine (PHENERGAN) tablet 12.5 mg  12.5 mg Oral Q6H PRN [facility] ROZINA Christenesn        Or    ondansetron [facility]) injection 4 mg  4 mg Intravenous Q6H PRN [facility]        ceFAZolin (ANCEF) 3 g in dextrose 5 % 100 mL IVPB  3 g Intravenous Q8H [facility]   Stopped at 01/12/21 0101    HYDROcodone-acetaminophen (NORCO) 5-325 MG per tablet 1 tablet  1 tablet Oral Q6H PRN Yogesh Juarez PA-C        HYDROmorphone (DILAUDID) injection 0.5 mg  0.5 mg Intravenous Q3H PRN Yogesh Juarez PA-C   0.5 mg at 01/12/21 0532    [Held by provider] losartan (COZAAR) tablet 100 mg  100 mg Oral Daily SANDRA FeltonC   100 mg at 01/10/21 1700    And    [Held by provider] hydroCHLOROthiazide (HYDRODIURIL) tablet 12.5 mg  12.5 mg Oral Daily Yogesh Juarez PA-C        fentaNYL 5 mcg/ml in 0.9%  ml infusion  25 mcg/hr Intravenous Continuous Jerindioe Knee, APRN - CNP   Stopped at 01/10/21 2030    levETIRAcetam (KEPPRA) 1,000 mg in sodium chloride 0.9 % 100 mL IVPB  1,000 mg Intravenous Q12H Stephan Jennings MD   Stopped at 01/12/21 0646    sodium chloride flush 0.9 % injection 10 mL  10 mL Intravenous 2 times per day Yogesh Juarez PA-C   10 mL at 01/11/21 0815    sodium chloride flush 0.9 % injection 10 mL  10 mL Intravenous PRN Yogesh Juarez PA-C        metoprolol tartrate (LOPRESSOR) tablet 25 mg  25 mg Oral BID Yogesh Juarez PA-C   25 mg at 01/11/21 2119    polyethylene glycol (GLYCOLAX) packet 17 g  17 g Oral Daily PRN Yogesh Juarez PA-C                fentanNYL, 50 mcg, Q2H PRN      glucose, 15 g, PRN      dextrose, 12.5 g, PRN      glucagon (rDNA), 1 mg, PRN      dextrose, 100 mL/hr, PRN      potassium chloride, 20 mEq, PRN      hydrALAZINE, 10 mg, Q6H PRN      promethazine, 6.25 mg, Q6H PRN      sodium chloride flush, 10 mL, PRN      promethazine, 12.5 mg, Q6H PRN    Or      ondansetron, 4 mg, Q6H PRN      HYDROcodone 5 mg - acetaminophen, 1 tablet, Q6H PRN      HYDROmorphone, 0.5 mg, Q3H PRN      sodium chloride flush, 10 mL, PRN      polyethylene glycol, 17 g, Daily PRN         sodium chloride 30 mL/hr (01/12/21 0309)    norepinephrine Stopped (01/11/21 1316)    midazolam 1 mg/hr (01/12/21 0808)    dextrose      niCARdipine 15 mg/hr (01/12/21 0833)    propofol Stopped (01/11/21 1327)    fentaNYL 5 mcg/ml in 0.9% NS

## 2021-01-12 NOTE — PROGRESS NOTES
Comprehensive Nutrition Assessment    Type and Reason for Visit:  Initial(TF check)    Nutrition Recommendations/Plan:  Continue Vital 1.2 TF ( also low CHO)  with goal of 60 ml/hr. Free H20 flush per Dr.  When pt is extubated, recommend swallow evaluation as appropriate. Monitor labs including glucose. Nutrition Assessment:     Pt. nutritionally compromised AEB NPO status, but with some improvement nutritionally as tolerating TF at goal . At risk for further nutrition compromise r/t stroke, STEMI , reported CABG needed after neuro status stable,  s/p craniotomy with drains 1/10/21,  intubated and underlying medical condition (HTN, CVA, DVT, Lymphedema ). Nutrition recommendations/interventions as per above. Malnutrition Assessment:  Malnutrition Status: At risk for malnutrition (Comment)    Context:  Acute Illness     Findings of the 6 clinical characteristics of malnutrition:  Energy Intake:  Unable to assess  Weight Loss:  No significant weight loss     Body Fat Loss:  No significant body fat loss     Muscle Mass Loss:  No significant muscle mass loss    Fluid Accumulation:  1 - Mild     Strength:  Not Performed    Estimated Daily Nutrient Needs:  Energy (kcal):  ~1721 kcals ( 14); Weight Used for Energy Requirements:  Admission(122.9 kg)     Protein (g):  ~109 grams ( 1.3); Weight Used for Protein Requirements:  Ideal        Fluid (ml/day):  per Dr;          Nutrition Related Findings:  Pt seen head wrapped, s/p OR, intubated with TF infusing at goal of 60 ml/hr. last bm recorded 1/7. 1/7 A1c 5.5%. 1/12 glucose 186, Hgb 10.3. MAP 82. meds include humalog, Keppra, ATB, versed & bm med. reported pt withdrawals to pain.       Wounds:  Surgical Incision(pt s/p 1/10/21 Right-sided frontoparietal craniotomy and evacuation of large frontoparietal parenchymal hemorrhage)       Current Nutrition Therapies:    Current Tube Feeding (TF) Orders:  · Feeding Route: Orogastric  · Formula: Semi-Elemental(Vital

## 2021-01-12 NOTE — PROGRESS NOTES
[facility] Laboratory Technician Worksheet      EEG Date: 2021    Name: Kaz Montana   : 1965   Age: 60 y.o. SEX: male    ROOM: 16 MRN: [id number]           CSN: [id number]      Ordering Provider: Tessie Early  EEG Number: [id number] Time of Test:  4926    Hand: Right   Sedation: no    H.V. Done: No ON VENT Photic: No    Sleep: Yes  Drowsy: No   Sleep Deprived: No    Seizures observed: no    Mentality: comatose      Clinical History:Hemorrhagic stroke: S/p right craniotomy with evacuation of right intraparenchymal hematoma, placement of right decompressive cranial drain. ON VENT OFF VERSED TODAY. CT TODAY  1. No significant interval change in postoperative changes with pneumocephalus and subarachnoid blood in the right frontal parietal lobe towards the vertex. 2. Moderate amount of intraventricular hemorrhage with dilatation of the temporal horns similar in appearance to prior study. There is a 7 mm right to left midline shift, similar to prior. 3. Evolving right frontal parietal infarction.    Continued close follow-up is advised         Past Medical History:       Diagnosis Date    Cerebral artery occlusion with cerebral infarction ([address]     Deep vein blood clot of left lower extremity (HCC)     Headache     Hyperlipidemia     Hypertension     Kidney stone 3/4/2019    Lymphedema of both lower extremities     wears lymphedema boots    Sciatica of left side        Scheduled Meds:   mannitol  50 g Intravenous Q8H    ceFAZolin (ANCEF) IVPB  3 g Intravenous Q8H    famotidine  20 mg Oral BID    calcium replacement protocol   Other RX Placeholder    atorvastatin  40 mg Per NG tube Nightly    insulin lispro  0-6 Units Subcutaneous Q6H    sodium chloride flush  10 mL Intravenous 2 times per day    [Held by provider] losartan  100 mg Oral Daily    And    [Held by provider] hydroCHLOROthiazide  12.5 mg Oral Daily    levetiracetam  1,000 mg Intravenous Q12H    sodium chloride flush  10 mL Intravenous 2 times per day    metoprolol tartrate  25 mg Oral BID     Continuous Infusions:   sodium chloride 20 mL/hr (01/12/21 1252)    norepinephrine 2 mcg/min (01/12/21 1150)    midazolam 1 mg/hr (01/12/21 0808)    dextrose      niCARdipine Stopped (01/12/21 1121)    propofol Stopped (01/11/21 1327)    fentaNYL 5 mcg/ml in 0.9%  ml infusion Stopped (01/10/21 2030)     PRN Meds:.fentanNYL, glucose, dextrose, glucagon (rDNA), dextrose, potassium chloride, hydrALAZINE, promethazine, sodium chloride flush, promethazine **OR** ondansetron, HYDROcodone 5 mg - acetaminophen, HYDROmorphone, sodium chloride flush, polyethylene glycol    Technician: Tad Whitfield 1/12/2021

## 2021-01-12 NOTE — FLOWSHEET NOTE
tPa injected into biateral EVD's per Doe Durham, CNP as ordered. Both drains clamped and to remain clamped for 2 hours.

## 2021-01-13 NOTE — CARE COORDINATION
DISASTER CHARTING    1/13/21, 1:21 PM EST    DISCHARGE ONGOING EVALUATION:     DAINA WATERS day: 6  Location: -16/016-A Reason for admit: Chest pain [R07.9]     1/9 Cardiac Cath: Multivessel CAD  1/10 RIGHT CRANIOTOMY WITH EVACUATION OF RIGHT INTRAPARENCHYMAL HEMATOMA, PLACEMENT OF RIGHT DECOMPRESSIVE CRANIAL DRAINS  1/10 Intubated    Barriers to Discharge:  POD #3. Neurology consulted for hemorrhagic stroke. EEG yesterday with left frontal sharp waves, no clinical seizure noted. Plan for MRI/MRA brain today. Remains on vent w/ETT on PCV, peep 6, FIO2 30%, sats 99%. Tmax 102. 2. NSR. Unable to follow commands; no movement to painful stim. Pupils equal, nonreactive. +Gag. Na+ Q2H. Osmolality Q8H. Intensivist, Cardiology, Neurosurgery, CVS, and Neurology following. CVC. Ariana. OG w/TF. EVD @ 0 cm H20 above EAM. Parietal drain to gravity. Macias. SCDs. Cardene @ 7.5 mg/hr, levo @ 1 mcg/min,  3% saline @ 20 ml/hr, IV ancef, pepcid, prn IV fentanyl, prn IV dilaudid, SSI, IV keppra, mannitol Q8H, Electrolyte replacement protocols. CO2 22, hgb 10.9. Osmolality 307. PCP: No primary care provider on file. Readmission Risk Score: 12%  Patient Goals/Plan/Treatment Preferences: From home with wife. Plan pending clinical course.

## 2021-01-13 NOTE — PROGRESS NOTES
CRITICAL CARE PROGRESS NOTE      Patient:  Aubrey Velazquez    Unit/Bed:4D-16/016-A  YOB: 1965  MRN: [id number]   PCP: No primary care provider on file. Date of Admission: 1/7/2021  Chief Complaint:-Intraparenchymal hemorrhage    Assessment and Plan:    1. Hemorrhagic stroke: S/p right craniotomy with evacuation of right intraparenchymal hematoma, placement of right decompressive cranial drain. Stat CT yesterday after neuro changes showed no significant interval change from post-evacuation. CT this AM showed \"Interval decrease in the amount of blood identified within the fourth ventricle.  Otherwise stable appearance of the brain. \" Cardene to target -160. Neuro checks, hypertonic saline 3% with sodium monitor every 2 hours, aggressive supportive care include treatment of temp, electrolytes, and blood pressure control. 2. Hypertensive emergency: Target SBP to be 140-160 with an A-line inserted in OR. Currently on Cardene gtt. 3. CAD: Multivessel disease on PCI 1/9, CABG to be done after his neuro status stable. 4. DVT hx: chronically on coumadin, started on Lovenox 1/9. Stopped secondary to , SCD currently. INR 1.19.  5. Macrocytic anemia: Stable, monitor    6. Hx CVA: previous CVA, no deficits, according to daughter PFO   7. Requiring mechanical ventilation:  26/6, 30%. Sedation: Versed 2mg/hr. 8. Fluids: NaCL 3% @ 20. Most recent Na was 142. To keep between 140-150.  9. DVT prophylaxis: Held secondary to head bleed. 10. GI prophylaxis: Pepcid, glycolax  11. Nutrition: TF @ 60cc/hr  12. Lines: EVD at 5mmHg, calvarial drain to gravity, ETT, Macias, NG, RIJ CVC      INITIAL H AND P AND ICU COURSE:  Aubrey Velazquez is a 60year old white male who [facility] on [facility]/7/20[facility] for chest pain 10/10 mid sternal radiates to jaw with SOB.  He has a past medical history DVT, hypertension, kidney stone, sciatica.  Per report patient woke up on the night of admission with 10 out of 10 chest pain.  Otherwise stable appearance of the brain. \"        Past Medical History:    Past Medical History:   Diagnosis Date    Cerebral artery occlusion with cerebral infarction ([facility] [address]     Deep vein blood clot of left lower extremity (HCC)     Headache     Hyperlipidemia     Hypertension     Kidney stone 3/4/2019    Lymphedema of both lower extremities     wears lymphedema boots    Sciatica of left side       Family History:    Family History   Problem Relation Age of Onset    Diabetes Mother     Colon Cancer Father     Heart Disease Father     Heart Attack Father       Social History:     Former smoker, social alcohol    ROS Review of Systems   Unable to perform ROS: Intubated        Scheduled Meds:   ceFAZolin (ANCEF) IVPB  3 g Intravenous Q8H    famotidine  20 mg Oral BID    calcium replacement protocol   Other RX Placeholder    atorvastatin  40 mg Per NG tube Nightly    insulin lispro  0-6 Units Subcutaneous Q6H    sodium chloride flush  10 mL Intravenous 2 times per day    [Held by provider] losartan  100 mg Oral Daily    And    [Held by provider] hydroCHLOROthiazide  12.5 mg Oral Daily    levetiracetam  1,000 mg Intravenous Q12H    sodium chloride flush  10 mL Intravenous 2 times per day    metoprolol tartrate  25 mg Oral BID     Continuous Infusions:   mannitol Stopped (01/13/21 0044)    norepinephrine 1 mcg/min (01/12/21 1354)    midazolam Stopped (01/12/21 2000)    dextrose      niCARdipine 7.5 mg/hr (01/13/21 0430)    propofol Stopped (01/11/21 1327)    fentaNYL 5 mcg/ml in 0.9%  ml infusion Stopped (01/10/21 2030)       PHYSICAL EXAMINATION:  Vitals:    01/13/21 0230 01/13/21 0300 01/13/21 0400 01/13/21 0502   BP:  131/82 136/82    Pulse: 87 89 97 105   Resp: 25 26 25 26   Temp:  102.2 °F (39 °C) 102.2 °F (39 °C)    TempSrc:  Bladder Bladder    SpO2: 97% 99% 98% 94%   Weight:       Height:            In: 4986.4   Out: 3061 [Urine:2953; Drains:108]     Body mass index is 37.67 kg/m².     GCS:   No data recorded     Ventilator Settings:   PCV   PIP: 26  PEEP: 6  Rate: 18  FiO2: 30%      Physical Exam  HENT:      Head:      Comments: EVD and decompressive drains. Right Ear: External ear normal.      Left Ear: External ear normal.      Nose: Nose normal.      Mouth/Throat:      Mouth: Mucous membranes are moist.      Pharynx: Oropharynx is clear. Eyes:      Conjunctiva/sclera: Conjunctivae normal.      Pupils: Pupils are equal.      Right eye: Pupil is sluggish. Left eye: Pupil is sluggish. Comments: Pupils 3 mm bilaterally, nonreactive   Neck:      Musculoskeletal: No neck rigidity. Cardiovascular:      Rate and Rhythm: Normal rate and regular rhythm. Pulses: Normal pulses. Heart sounds: Normal heart sounds. No murmur. No gallop. Pulmonary:      Effort: Pulmonary effort is normal. No respiratory distress. Breath sounds: Normal breath sounds. No wheezing, rhonchi or rales. Abdominal:      General: Abdomen is flat. Bowel sounds are normal. There is no distension. Palpations: Abdomen is soft. Musculoskeletal:      Right lower leg: No edema. Left lower leg: No edema. Lymphadenopathy:      Cervical: No cervical adenopathy. Skin:     General: Skin is warm and dry. Capillary Refill: Capillary refill takes less than 2 seconds. Coloration: Skin is not jaundiced. Findings: No bruising or rash. Neurological:      GCS: GCS eye subscore is 1. GCS verbal subscore is 1. GCS motor subscore is 4. Comments: Withdrawing to pain all 4 extremities. Breathing over vent. Data Review: (All radiographs, tracings, PFTs, and imaging are personally viewed and interpreted unless otherwise noted).       Sodium 142, potassium 3.7 chloride 112, CO2 22, BUN14, creatinine 1.0, anion gap 8.0, glucose 149,  calcium 8.6   WBC 10.2, hemoglobin 10.9, hematocrit 32.9, platelet count 252   Telemetry shows normal sinus rhythm    INR 1.19   Bio fire negative.  Stat CT yesterday showed no significant interval change from post-evacuation. CT this AM showed \"Interval decrease in the amount of blood identified within the fourth ventricle.  Otherwise stable appearance of the brain. \" Still significant vasogenic edema              Seen with multidisciplinary ICU team yes. Meets Continued ICU Level Care Criteria:    [x] Yes   [] No - Transfer Planned to listed location:  [] HOSPITALIST CONTACTED-      Case and plan discussed with Dr. Kemi Aguilar    Electronically signed by Ravin Orellana MD on 1/13/2021 at 5:22 AM    Patient seen by me. Case discussed with resident physician. MRI brain shows developing infarct in the left temporal lobe. Neurologic CVA has diminished. Residual right apical edema and infarct persist.  Poor prognosis. Await family meeting to discuss parameters of care. CC time 35 minutes. Time was discontiguous. Time does not include procedures. Time does include my direct assessment of the patient and coordination of care.   Electronically signed by Radha Toscano MD on 1/13/2021 at 3:36 PM

## 2021-01-13 NOTE — PROGRESS NOTES
To radiology dept for MRI/MRA of brain with RN, RT and monitor. 1430- Returned to ICU - pt noted to have increased resp effort. Dr Cherelle Zhou aware. 1616- Cont to have resp distress- Diprivan drip started. 1640- Wife at bedside- Dr Cherelle Zhou updating the wife on condition. 1800- Dr Cherelle Zhou and Van Cochran CNP spoke with family about pt critical condition- questions answered and support given. 1900- Family at bedside. Cont to have some increased WOB. 1925- Report given to [facility]. Dr Cherelle Zhou at bedside with family.

## 2021-01-13 NOTE — SIGNIFICANT EVENT
Spoke to wife, daughter, son, as well as stepchildren about patient's condition. Family felt that patient would not want to have a trach, PEG, or life support. They are going to take some time to consider withdrawing care, as prognosis is poor and likelihood of meaningful recovery unlikely.   At this time family would like to switch his CODE STATUS to DO NOT RESUSCITATE Comfort Care arrest.    Chronically signed by Leonardo Mcdaniel at 7900, 4/99/7710

## 2021-01-13 NOTE — FLOWSHEET NOTE
01/13/21 0959   Encounter Summary   Services provided to: Patient   Referral/Consult From: Rounding   Support System Spouse; Children   Continue Visiting Yes  (1/13 NR)   Complexity of Encounter Low   Length of Encounter 15 minutes   Routine   Type Follow up   Assessment Unable to respond   Intervention Prayer   In my encounter with the 60 yr old patient, I attempted to see the patient but they were unresponsive and on the ventilator at this time. The ICU nurse stated that the pt has a poor prognosis. The pt was admitted due to chest pains and coronary artery risk. No family was present in the room. A  will attempt to see the patient at a later time as a follow up.

## 2021-01-13 NOTE — FLOWSHEET NOTE
01/12/21 2032   Provider Notification   Reason for Communication Review case  (ICP is 35)   Provider Name Nakita Ayala    Provider Notification Physician   Method of Communication Secure Message   Response Waiting for response   Notification Time 2032   ICP is 33. EVD's are not draining. Dose of Mannitol given. Neuro assessment is unchanged. No intervention at this time. Order for repeat CT in the morning.

## 2021-01-14 NOTE — FLOWSHEET NOTE
Sats reading mid to low 80's. Placed on 100% FiO2. Unable to pass suction catheter. Resp therapist notified and ET tube irrigated, able to pass catheter with difficulty, minimal secretions. Not returning volumes on vent. Dr Newton Dumont summoned to room. Emergent bronch done and found obstruction. Extubated and preparing to reintubate. Reintubated with #8 ET tube and suctioning large amounts mucous. ET secured at 24cm at lip using bronchoscopic guidance. Breath sounds present throughout all lung fields. Placed back on vent. Pulse ox 100%. #14 Fr OG inserted with good air auscultation over stomach and return of gastric contents. Awaiting CXR.

## 2021-01-14 NOTE — CARE COORDINATION
DISASTER CHARTING    1/14/21, 3:25 PM EST    DISCHARGE ONGOING EVALUATION:     DAINA WATERS day: 7  Location: -16/016-A Reason for admit: Chest pain [R07.9]     1/9 Cardiac Cath: Multivessel CAD  1/10 RIGHT CRANIOTOMY WITH EVACUATION OF RIGHT INTRAPARENCHYMAL HEMATOMA, PLACEMENT OF RIGHT DECOMPRESSIVE CRANIAL DRAINS  1/10 Intubated    Barriers to Discharge: MRI yesterday showed significant ischemia bilaterally, as well as vasogenic edema. Intensivist spoke with family and they changed code status to [facility]. Family discussing parameters of care, possible withdrawal of care. Emergent bronch today d/t upper airway obstruction; large mucus plug removed. Cardene off this morning. Remains on vent w/ETT on PCV, peep 6, FIO2 30%, sats 97%. Tmax 100.2. NSR. Unable to follow commands; moves only LUE to painful stim. Pupils equal, nonreactive. +Gag. Na+ Q2H. Osmolality Q8H. Intensivist, Cardiology, Neurosurgery, CVS, and Neurology following. CVC. Ariana. OGT. EVD @ 0 cm H20 above EAM. Parietal drain to gravity. Macias. SCDs. Levo @ 1 mcg/min, diprivan @ 20 mcg/kg/min, 3% saline @ 20 ml/hr, IV ancef, pepcid, prn IV fentanyl, SSI, IV keppra, mannitol Q8H, Electrolyte replacement protocols. Na+ 144, hgb 10.5, serum osmolality 314, INR 1.2. PCP: No primary care provider on file. Readmission Risk Score: 12%  Patient Goals/Plan/Treatment Preferences: From home with wife.  Plan pending clinical course.

## 2021-01-14 NOTE — PROGRESS NOTES
CRITICAL CARE PROGRESS NOTE      Patient:  Jena Cottrell    Unit/Bed:4D-16/016-A  YOB: 1965  MRN: [id number]   PCP: No primary care provider on file. Date of Admission: 1/7/2021  Chief Complaint:-Intraparenchymal hemorrhage    Assessment and Plan:    1. Hemorrhagic stroke: S/p right craniotomy with evacuation of right intraparenchymal hematoma, placement of right decompressive cranial drain. MRI yesterday showed significant ischemia bilaterally, as well as vasogenic edema. \" Cardene to target -160. Neuro checks, hypertonic saline 3% with sodium monitor every 2 hours, aggressive supportive care include treatment of temp, electrolytes, and blood pressure control. 2. Hypertensive emergency: Target SBP to be 140-160 with an A-line inserted in OR. Currently on Cardene gtt. 3. CAD: Multivessel disease on PCI 1/9.  4. DVT hx: chronically on coumadin, started on Lovenox 1/9. Stopped secondary to , SCD currently. INR 1.20.  5. Macrocytic anemia: Stable, monitor    6. Hx CVA: previous CVA, no deficits, according to daughter PFO   7. Requiring mechanical ventilation:  29/7, %. Sedation: Propofol 50.  8. Fluids: NaCL 3% @ 20. Most recent Na was 147. To keep between 140-150.  9. DVT prophylaxis: Held secondary to head bleed. 10. CODE STATUS:.  Last night spoke with family, they wished to change CODE STATUS to DNR CCA. 11. GI prophylaxis: Pepcid, glycolax  12. Nutrition: TF @ 40cc/hr  13.  Lines: EVD at 5mmHg, calvarial drain to gravity, ETT, Macias, NG, RIJ CVC      INITIAL H AND P AND ICU COURSE:  Jena Cottrell is a 60year old white male who presented to [facility] on 1/7/2021 for chest pain 10/10 mid sternal radiates to jaw with SOB.  He has a past medical history DVT, hypertension, kidney stone, sciatica.  Per report patient woke up on the night of admission with 10 out of 10 chest pain.  Midsternal and crushing that radiated to the jaw with associated shortness of breath.  EMS was called and he was transferred to [facility].  Nitroglycerin did resolve chest pain. [facility] was admitted to stepdown.  Cardiology was consulted and patient did undergo stress test and DARINEL on 1/8.  On 1/9 he did undergo heart cath that did show multivessel CAD and [facility] was consulted for possible plan for CABG.  Overnight code stroke was called.  When arriving a code stroke patient stated he had troubles moving his left side.  He did have facial droop.  He was very anxious.  Blood pressure was noted to be systolic greater than 790.  He was taken for emergency CT with plans on CTA of head and neck.  Upon completion of CT it was noted that patient had intercranial hemorrhage.  He was immediately brought to the ICU.  Stroke team did call with recommendations.  Dr. Marizol Anedrson called as the neurosurgeon on-call. 1/10 - S/p right craniotomy with evacuation of right intraparenchymal hematoma, placement of right decompressive cranial drains with intracranial pressure monitor. Kept intubated with a neurochecks with stable hemodynamics. 1/11 -patient having small amount of drainage from decompressive cranial drain. ICPs about 6- 9 mmHg per monitor. Patient on 40 of propofol. Withdrawing to pain in all 4 extremities. Pupils 2 mm minimally reactive bilaterally. Will start levo to maintain -160.    1/12 - Pt intubated, sedated on 2mg/h of Versed. Withdrawing from pain x 4 ext. Pupils 3mm BL, minimally reactive, sluggish. Corneal and cough reflex intact. Blood pressure well controlled with nicardipine gtt. 1/13 - Patient did have episode on decreased responsiveness yesterday about noon; neuro changes with enlarged unequal pupils. Stat CT yesterday showed no significant interval change from post-evacuation. CT this AM showed \"Interval decrease in the amount of blood identified within the fourth ventricle.  Otherwise stable appearance of the brain. \"    1/14 -MRI yesterday showed significant ischemia bilaterally, as well as vasogenic edema. Had a discussion yesterday evening with family. They likely will want to withdraw care. Did decide to make patient DNR CCA. Patient had no change overnight. Sodium 147. To continue 3%. Past Medical History:    Past Medical History:   Diagnosis Date    Cerebral artery occlusion with cerebral infarction ([address]     Deep vein blood clot of left lower extremity (HCC)     Headache     Hyperlipidemia     Hypertension     Kidney stone 3/4/2019    Lymphedema of both lower extremities     wears lymphedema boots    Sciatica of left side       Family History:    Family History   Problem Relation Age of Onset    Diabetes Mother     Colon Cancer Father     Heart Disease Father     Heart Attack Father       Social History:     Former smoker, social alcohol    ROS Review of Systems   Unable to perform ROS: Intubated        Scheduled Meds:   insulin lispro  0-6 Units Subcutaneous Q6H    chlorhexidine  15 mL Mouth/Throat BID    ceFAZolin (ANCEF) IVPB  3 g Intravenous Q8H    famotidine  20 mg Oral BID    calcium replacement protocol   Other RX Placeholder    atorvastatin  40 mg Per NG tube Nightly    sodium chloride flush  10 mL Intravenous 2 times per day    [Held by provider] losartan  100 mg Oral Daily    And    [Held by provider] hydroCHLOROthiazide  12.5 mg Oral Daily    levetiracetam  1,000 mg Intravenous Q12H    sodium chloride flush  10 mL Intravenous 2 times per day    metoprolol tartrate  25 mg Oral BID     Continuous Infusions:   mannitol Stopped (01/14/21 0116)    norepinephrine 1 mcg/min (01/12/21 1354)    midazolam Stopped (01/12/21 2000)    dextrose      niCARdipine 5.5 mg/hr (01/14/21 0607)    propofol 50 mcg/kg/min (01/14/21 9395)    fentaNYL 5 mcg/ml in 0.9%  ml infusion Stopped (01/10/21 2030)       PHYSICAL EXAMINATION:  Vitals:    01/14/21 0500 01/14/21 0517 01/14/21 0545 01/14/21 0600   BP: 132/86   125/84   Pulse: 95 97  99   Resp: 27 27 27   Temp:    98.1 °F (36.7 °C)   TempSrc:    Bladder   SpO2: 92% 94%  92%   Weight:   279 lb 1.6 oz (126.6 kg)    Height:            In: 4763.1   Out: 5326 [Urine:5200; Drains:126]     Body mass index is 36.82 kg/m². GCS:   No data recorded     Ventilator Settings:   PCV   PIP: 29  PEEP: 7  Rate: 18  FiO2: 45%      Physical Exam  HENT:      Head:      Comments: EVD and decompressive drains. Right Ear: External ear normal.      Left Ear: External ear normal.      Nose: Nose normal.      Mouth/Throat:      Mouth: Mucous membranes are moist.      Pharynx: Oropharynx is clear. Eyes:      Conjunctiva/sclera: Conjunctivae normal.      Pupils: Pupils are equal.      Right eye: Pupil is not reactive. Pupil is not sluggish. Left eye: Pupil is not reactive. Pupil is not sluggish. Comments: Pupils 3 mm bilaterally, nonreactive   Neck:      Musculoskeletal: No neck rigidity. Cardiovascular:      Rate and Rhythm: Normal rate and regular rhythm. Pulses: Normal pulses. Heart sounds: Normal heart sounds. No murmur. No gallop. Pulmonary:      Effort: Pulmonary effort is normal. No respiratory distress. Breath sounds: Normal breath sounds. No wheezing, rhonchi or rales. Abdominal:      General: Abdomen is flat. Bowel sounds are normal. There is no distension. Palpations: Abdomen is soft. Musculoskeletal:      Right lower leg: No edema. Left lower leg: No edema. Lymphadenopathy:      Cervical: No cervical adenopathy. Skin:     General: Skin is warm and dry. Capillary Refill: Capillary refill takes less than 2 seconds. Coloration: Skin is not jaundiced. Findings: No bruising or rash. Neurological:      GCS: GCS eye subscore is 1. GCS verbal subscore is 1. GCS motor subscore is 4. Comments: Decerebrate posturing to pain. Breathing over vent.             Data Review: (All radiographs, tracings, PFTs, and imaging are personally viewed and interpreted unless otherwise noted).  Sodium 147, potassium 4.2, chloride 113, CO2 27, BUN 14, creatinine 0.9, glucose 145.  WBC 9.1, hemoglobin 10.5, hematocrit 32.9, platelet count 718   Telemetry shows normal sinus rhythm   INR 1.20   Bio fire negative.  MRI yesterday showed  developing infarct in the left temporal lobe. Seen with multidisciplinary ICU team yes. Meets Continued ICU Level Care Criteria:    [x] Yes   [] No - Transfer Planned to listed location:  [] HOSPITALIST CONTACTED-      Case and plan discussed with Dr. De Paz Appl    Electronically signed by Emma Swanson MD on 1/14/2021 at 7:10 AM    Patient seen by me. Case discussed with resident physician. With devastating infarcts bilaterally. These are in a shower embolic phenomenon. Paradoxical embolism from PFO is suspected. Hemorrhage was from ischemic infarct with reperfusion injury and bleed. Subsequent infarcts identified on MRI. Awaiting family meeting to discuss horrible outcome. Will need to redefine parameters of care. CC time 35 minutes. Time was discontiguous. Time does not include procedures. Time does include my direct assessment of the patient and coordination of care.   Electronically signed by Marcelo Correa MD on 1/14/2021 at 8:13 AM

## 2021-01-14 NOTE — PROGRESS NOTES
perform high complexity decision making and speaking with the nursing staff regarding recommendations. Jena Cottrell is a 60 y.o. male, admitted on :1/7/2021  1:13 AM    SUBJECTIVE:      Patient is a still intubated and sedated. Withdrawal to pain in all his extremities per his nurse when he is off sedation. OBJECTIVE:    High ICP issues over the night. No significant changes in patient neuro exam compared with yesterday  Patient is still intubated but not sedated. GCS 6T: (M4, E1, V1T)  Pupils equal but not reactive  Cough reflex  Has corneal reflex. Labs: Today labs were reviewed. RADIOLOGY:  Pertinent images have been reviewed. Today brain CT:    Interval decrease in the amount of blood identified within the fourth    ventricle.  Otherwise stable appearance of the brain. PROBLEM LIST:  Patient Active Problem List   Diagnosis    Cellulitis    Rash    Chronic anticoagulation    Presence of IVC filter    Kidney stone    Acute midline low back pain with right-sided sciatica    Low back pain radiating to right lower extremity    Sciatica of left side    Thunderclap headache    Essential hypertension    Anticoagulated    Hx of pulmonary embolus    History of DVT (deep vein thrombosis)    Migraine    Constipation    Chronic acquired lymphedema    Chest pain, moderate coronary artery risk    Acute coronary syndromes (HCC)    Intraparenchymal hemorrhage of brain (HCC)    S/P evacuation of hematoma    Acute respiratory failure (HCC)    Intraventricular hemorrhage (HCC)       MEDICATIONS:   Prior to Admission medications    Medication Sig Start Date End Date Taking?  Authorizing Provider   metoprolol succinate (TOPROL XL) 50 MG extended release tablet Take 50 mg by mouth daily   Yes Historical Provider, MD   olmesartan-hydroCHLOROthiazide (BENICAR HCT) 40-12.5 MG per tablet Take 1 tablet by mouth daily   Yes Historical Provider, MD   warfarin (COUMADIN) 4 MG tablet

## 2021-01-14 NOTE — PROCEDURES
BRONCHOSCOPY:Emergent     Indication:  Upper airway obstruction. Sedation:   Propofol    Findings:  ETT obstruction in posterior pharynx above the cords and at base of tongue. Unclear how ETT migrated back. Tried to advance ETT over bronchoscope into airway. Tube bowed into posterior pharynx. ETT removed. New 8.0 ETT loaded over bronchoscope. Bronchoscope placed into airway, and ETT advanced over bronchoscope. Large mucous plug migrated into airway from upper airway. It was not present upon initial assessment. It was cleared with saline irrigation and suction. Samples:   None    EBL:   None    Complications:  None    Procedure:  ETT obstruction in posterior pharynx above the cords and at base of tongue. Unclear how ETT migrated back. Tried to advance ETT over bronchoscope into airway. Tube bowed into posterior pharynx. ETT removed. New 8.0 ETT loaded over bronchoscope. Bronchoscope placed into airway, and ETT advanced over bronchoscope. Large mucous plug migrated into airway from upper airway. It was not present upon initial assessment. It was cleared with saline irrigation and suction. .    Electronically signed by Tarik Griffin MD.

## 2021-01-14 NOTE — PROGRESS NOTES
[address]  NEUROSURGERY PROGRESS NOTE    Candace Dejesus   YOB: 1965  Account Number: [de-identified]  Contact Serial Number:  [id number]   Date of Examination: 1/14/2021      ASSESSMENT:      - Post op day 4 (right-sided craniotomy and evacuation of right-sided intraparenchymal bleed). \  -Postop day 4 (right-sided EVD placement). -This is a 68-year-old male who developed an acute significant intraparenchymal and intraventricular system bleed in the context of treatment of advanced cardio vascular disease.  -Patient is still critically ill.  -Prognosis is still poor in general.  Expected long recovery course from neurosurgical perspective if patient survives. -Patient has multiple cerebellar infarctions that include multiple area in the brain in both sides as was shown in patient brain MRI. -Neurological status: Decline.  -I agree with Dr. Jah Guthrie, that patient suffered from shower embolic phenomenon with Paradoxical embolism from PFO is suspected. 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 -Given patient current neurological status and the findings of his new brain MRI, believe the prognosis is poor especially with regard the quality of life if he survives    PLAN:      -General Postop care:    · Cont. Current medical management per ICU team.  · 3% hypertonic saline. · Seizures precaution ( keppra 1g q12)  · EEG tomorrow  · Keep systolic blood pressure less than 160 and above 100. · Follow up the cardiology and stroke teams recommendation. · Posterior drain to gravity (will remove it tomorrow). · We will talk with patient family to see their perspective regarding the next step in patient treatment at this is point given patient overall prognosis      -EVD management:     · Keep the EVD at 0 centimeters at this time. · Transduce the EVD every 1 hour and report the ICP.   · We will inject 1 mg of tPA in the EVD catheter today.    -ICP management:    ·  Keep the ICP less than  20 mm HG  · Keep systolic blood pressure more than 100  mm HG  · Keep Cerebral perfusion pressure (CPP)  more than 60 mm HG  · Keep blood glucose from  mg    -The case was discussed in detail with the ICU team, with cardiovascular surgery and patient nurse. * Time spent was at least 35 min of critical time of evaluating patient, discussion with staff, planning for treatment and evaluation. The time was spent examining patient face to face, reviewing the images personally, reviewing the chart, perform high complexity decision making and speaking with the nursing staff regarding recommendations. Paola Warner is a 60 y.o. male, admitted on :1/7/2021  1:13 AM    SUBJECTIVE:      Patient is a still intubated but not sedated        OBJECTIVE:      Patient is still intubated not sedated GCS 3T  Pupils equal but not reactive   Has cough reflex  Has corneal reflex. VITALS:    Vitals:    01/14/21 1100 01/14/21 1200 01/14/21 1234 01/14/21 1300   BP: 97/68 100/74  99/72   Pulse: 69 70 75 77   Resp: 16 14 15 23   Temp:  98.6 °F (37 °C)     TempSrc:  Bladder     SpO2: 97% 96% 100% 96%   Weight:       Height:           RADIOLOGY:  Pertinent images have been reviewed. Post op brain MRI  :    1. Extensive intraventricular hemorrhage in the right and left lateral ventricles, third ventricle and fourth ventricle. Parenchymal hemorrhage in the right frontal and parietal lobes and subarachnoid hemorrhage over the right frontal lobe. 2. Intraventricular shunt entering the calvarium in the right frontal region with tip in the frontal horn of the right lateral ventricle. Craniotomy involving the right frontal calvarium   3. Second shunt entering the calvarium in the right parietal region.    4. Extensive areas of restricted diffusion throughout the right frontal, parietal, temporal and occipital lobes, left frontal and temporal lobes and adjacent to the sylvian fissure to a lesser extent left parietal lobe and in the splenium of the corpus    callosum suggestive of areas of infarction. 5. Mass effect upon the right lateral ventricle and midline deviation. 6. Increased fluid in the scalp especially over the right frontal and temporal left posterior temporal regions. 7. Inflammatory changes in ethmoid air cells, maxillary sinuses, sphenoid sinuses and mastoid air cells bilaterally. 8. Increased signal intensity in the nasopharynx. PROBLEM LIST:  Patient Active Problem List   Diagnosis    Cellulitis    Rash    Chronic anticoagulation    Presence of IVC filter    Kidney stone    Acute midline low back pain with right-sided sciatica    Low back pain radiating to right lower extremity    Sciatica of left side    Thunderclap headache    Essential hypertension    Anticoagulated    Hx of pulmonary embolus    History of DVT (deep vein thrombosis)    Migraine    Constipation    Chronic acquired lymphedema    Chest pain, moderate coronary artery risk    Acute coronary syndromes (HCC)    Intraparenchymal hemorrhage of brain (HCC)    S/P evacuation of hematoma    Acute respiratory failure (HCC)    Intraventricular hemorrhage (HCC)       MEDICATIONS:   Prior to Admission medications    Medication Sig Start Date End Date Taking?  Authorizing Provider   metoprolol succinate (TOPROL XL) 50 MG extended release tablet Take 50 mg by mouth daily   Yes Historical Provider, MD   olmesartan-hydroCHLOROthiazide (BENICAR HCT) 40-12.5 MG per tablet Take 1 tablet by mouth daily   Yes Historical Provider, MD   warfarin (COUMADIN) 4 MG tablet Take 4 mg by mouth daily Managed by Dr Blossom Lopez Provider, MD   Aspirin-Acetaminophen-Caffeine (EXCEDRIN PO) Take 1 tablet by mouth as needed    Historical Provider, MD   naproxen sodium (ALEVE) 220 MG tablet Take 550 mg by mouth as needed for Pain    Historical Provider, MD   Multiple Vitamins-Minerals (THERAPEUTIC MULTIVITAMIN-MINERALS) tablet Take 1 tablet by mouth daily    Historical Provider, MD   Cholecalciferol (VITAMIN D) 2000 units CAPS capsule Take 2 capsules by mouth daily    Historical Provider, MD       Current Facility-Administered Medications   Medication Dose Route Frequency Provider Last Rate Last Admin    sodium chloride 3 % solution  20 mL/hr Intravenous Continuous Tamara Mirza MD        insulin lispro (HUMALOG) injection vial 0-6 Units  0-6 Units Subcutaneous Q6H Raghu Fuentes MD   1 Units at 01/14/21 0611    chlorhexidine (PERIDEX) 0.12 % solution 15 mL  15 mL Mouth/Throat BID Tamara Mirza MD   15 mL at 01/14/21 0915    ceFAZolin (ANCEF) 3 g in dextrose 5 % 100 mL IVPB  3 g Intravenous Q8H JASMYNE Abel - CNP   Stopped at 01/14/21 0945    mannitol 20 % IVPB 50 g  50 g Intravenous Q8H Russ Espino APRN - CNP   Stopped at 01/14/21 0940    acetaminophen (TYLENOL) suppository 650 mg  650 mg Rectal Q4H PRN Madeline Gates APRN - CNP   650 mg at 01/13/21 0515    famotidine (PEPCID) tablet 20 mg  20 mg Oral BID Tamara Mirza MD   20 mg at 01/14/21 0915    norepinephrine (LEVOPHED) 16 mg in dextrose 5% 250 mL infusion  2 mcg/min Intravenous Continuous Tamara Mirza MD 0.9 mL/hr at 01/12/21 1354 1 mcg/min at 01/12/21 1354    calcium replacement protocol   Other RX Placeholder Tamara Mirza MD        fentaNYL (SUBLIMAZE) injection 50 mcg  50 mcg Intravenous Q2H PRN Tamara Mirza MD   50 mcg at 01/13/21 1501    midazolam (VERSED) 100 mg in dextrose 5 % 100 mL infusion  1 mg/hr Intravenous Continuous Tamara Mirza MD   Stopped at 01/12/21 2000    atorvastatin (LIPITOR) tablet 40 mg  40 mg Per NG tube Nightly JASMYNE Faria - CNP   40 mg at 01/13/21 2223    glucose (GLUTOSE) 40 % oral gel 15 g  15 g Oral PRN Madeline Gates APRN - CNP        dextrose 50 % IV solution  12.5 g Intravenous PRN Madeline Mirlande Anu, APRN - CNP        glucagon (rDNA) injection 1 mg  1 mg Intramuscular PRN Madeline Mirlande Anu, APRN - CNP        dextrose 5 % solution  100 mL/hr Intravenous PRN Madeline Malva Anu, APRN - CNP        potassium chloride 20 mEq/50 mL IVPB (Central Line)  20 mEq Intravenous PRN Madeline Mirlande Anu, APRN - CNP 50 mL/hr at 01/12/21 1359 20 mEq at 01/12/21 1359    hydrALAZINE (APRESOLINE) injection 10 mg  10 mg Intravenous Q6H PRN SANDRA aBumC   10 mg at 01/12/21 0357    promethazine (PHENERGAN) injection 6.25 mg  6.25 mg Intramuscular Q6H PRN Dale Hart PA-C        niCARdipine (CARDENE) 25 mg in dextrose 5 % 250 mL infusion  5 mg/hr Intravenous Continuous Emma Swanson MD   Stopped at 01/14/21 0945    propofol injection  10 mcg/kg/min Intravenous Titrated Dale Hart PA-C 14.8 mL/hr at 01/14/21 1245 20 mcg/kg/min at 01/14/21 1245    promethazine (PHENERGAN) tablet 12.5 mg  12.5 mg Oral Q6H PRN Dale Hart PA-C        Or    ondansetron [facility]) injection 4 mg  4 mg Intravenous Q6H PRN Dale Hart PA-C        [Held by provider] losartan (COZAAR) tablet 100 mg  100 mg Oral Daily Dale Hart PA-C   100 mg at 01/10/21 1700    And    [Held by provider] hydroCHLOROthiazide (HYDRODIURIL) tablet 12.5 mg  12.5 mg Oral Daily Dale Hart PA-C        fentaNYL 5 mcg/ml in 0.9%  ml infusion  25 mcg/hr Intravenous Continuous JASMYNE Tubbs CNP   Stopped at 01/10/21 2030    levETIRAcetam (KEPPRA) 1,000 mg in sodium chloride 0.9 % 100 mL IVPB  1,000 mg Intravenous Q12H Hesham Gaines MD   Stopped at 01/14/21 0747    sodium chloride flush 0.9 % injection 10 mL  10 mL Intravenous 2 times per day Dale Hart PA-C   10 mL at 01/14/21 0920    sodium chloride flush 0.9 % injection 10 mL  10 mL Intravenous PRN Dale Hart PA-C        metoprolol

## 2021-01-14 NOTE — PROGRESS NOTES
ICU disposable  mobile scope  was used. Assisted Dr. Wanda Rivera with bronchoscopy procedure. Bronchial washings were not obtained. Patient tolerated the procedure well. The bronchoscope was disposed of in a red bag and placed in dirty utility room. Patient also re-intubated using scope with a #8 ETT positioned at the 24cm robbie.

## 2021-01-15 NOTE — PROGRESS NOTES
Reed Salcido up to floor and removed the right parietal region, put 3 staples in and 4x4 dressing with opsite.  Left parietal region drain dressing removed and 4x4 with opsite applied     1457 sent a text message to [facility] about ICP 26/28 after drain removed awaiting call back

## 2021-01-15 NOTE — PROGRESS NOTES
[place] [id number] PROGRESS NOTE      Patient: Cuca Corrales  Room #: 4D-16/016-A            YOB: 1965  Age: 60 y.o. Gender: male            Admit Date & Time: 1/7/2021  1:13 AM    Assessment:  Pt, a 60year old male was at the end of life. Family was devastated. The organ transplant group talked to the family about harvesting pt's organs. It was a difficult time for the family but they were so open to it with lots of tears. Interventions: Active listening, compassion, emotional support and prayer. Outcomes:  Family expressed their greeting and felt supported. Plan:  Continued spiritual support.        Electronically signed by Consuelo Fuller on 1/15/2021 at 5:13 PM.  Reed Trujillo  [phone]

## 2021-01-15 NOTE — CARE COORDINATION
DISASTER CHARTING    1/15/21, 1:02 PM EST    DISCHARGE ONGOING EVALUATION:     DAINA WATERS day: 8  Location: -16/016-A Reason for admit: Chest pain [R07.9]     1/9 Cardiac Cath: Multivessel CAD  1/10 RIGHT CRANIOTOMY WITH EVACUATION OF RIGHT INTRAPARENCHYMAL HEMATOMA, PLACEMENT OF RIGHT DECOMPRESSIVE CRANIAL DRAINS  1/10 Intubated    Barriers to Discharge: POD #5. 3% saline drip stopped. Plan for terminal withdraw of care tomorrow and change to [facility]. Awaiting son to arrive tonight. Remains on vent w/ETT on PCV, peep 6, FIO2 30%, sats 97%. Tmax 100.4. NSR. Unable to follow commands; no movement to painful stim x4. Decerebrate posturing noted. Pupils equal, nonreactive. +Gag. Na+ Q2H. Intensivist, Cardiology, Neurosurgery, CVS, and Neurology following. CVC. Ariana. OG w/TF. EVD @ 0 cm H20 above EAM. Parietal drain to gravity. Macias. SCDs. Levo @ 1 mcg/min, diprivan @ 40 mcg/kg/min, IV ancef, pepcid, prn IV fentanyl, SSI, IV keppra, mannitol Q8H, Electrolyte replacement protocols. Na+ 147, hgb 9.6, INR 1.18. PCP: No primary care provider on file. Readmission Risk Score: 12%  Patient Goals/Plan/Treatment Preferences: From home with wife.  Plan pending clinical course.

## 2021-01-15 NOTE — PROGRESS NOTES
CRITICAL CARE PROGRESS NOTE      Patient:  Shayy Delgado    Unit/Bed:4D-16/016-A  YOB: 1965  MRN: [id number]   PCP: No primary care provider on file. Date of Admission: 1/7/2021  Chief Complaint:-Intraparenchymal hemorrhage    Assessment and Plan:    1. Hemorrhagic stroke: S/p right craniotomy with evacuation of right intraparenchymal hematoma, placement of right decompressive cranial drain. MRI 1/13 showed significant ischemia bilaterally, as well as vasogenic edema. Cardene to target -160. Neuro checks, hypertonic saline 3% with sodium monitor every 2 hours, currently on hold for Na of 150. Aggressive supportive care include treatment of temp, electrolytes, and blood pressure control. 2. Hypertensive emergency: Target SBP to be 140-160 with an A-line inserted in OR. Currently on Cardene gtt. 3. CAD: Multivessel disease on PCI 1/9.  4. DVT hx: chronically on coumadin, started on Lovenox 1/9. Stopped secondary to #1, SCD currently. 5. Macrocytic anemia: Stable, monitor    6. Hx CVA: previous CVA, no deficits, according to daughter PFO   7. Requiring mechanical ventilation:  26/6, 30. Sedation: Propofol 40.  8. Fluids: NaCL 3% on hold. Most recent Na was 150. To keep between 140-150.  9. DVT prophylaxis: Held secondary to head bleed. 10. CODE STATUS:  1/13 - family wished to change CODE STATUS to DNR CCA. Will further discuss  Goals of care again today. 11. GI prophylaxis: Pepcid, glycolax  12. Nutrition: TF @ 40cc/hr  13.  Lines: EVD at 5mmHg, calvarial drain to gravity, ETT, Macias, NG, RIJ CVC      INITIAL H AND P AND ICU COURSE:  Shayy Delgado is a 60year old white male who presented to [facility] on 1/7/2021 for chest pain 10/10 mid sternal radiates to jaw with SOB.  He has a past medical history DVT, hypertension, kidney stone, sciatica.  Per report patient woke up on the night of admission with 10 out of 10 chest pain.  Midsternal and crushing that radiated to the jaw with associated shortness of breath.  EMS was called and he was transferred to [facility].  Nitroglycerin did resolve chest pain. [facility] was admitted to stepdown.  Cardiology was consulted and patient did undergo stress test and DARINEL on 1/8.  On 1/9 he did undergo heart cath that did show multivessel CAD and CVS was consulted for possible plan for CABG.  Overnight code stroke was called.  When arriving a code stroke patient stated he had troubles moving his left side.  He did have facial droop.  He was very anxious.  Blood pressure was noted to be systolic greater than 603.  He was taken for emergency CT with plans on CTA of head and neck.  Upon completion of CT it was noted that patient had intercranial hemorrhage.  He was immediately brought to the ICU.  Stroke team did call with recommendations.  Dr. Sherley Quigley called as the neurosurgeon on-call. 1/10 - S/p right craniotomy with evacuation of right intraparenchymal hematoma, placement of right decompressive cranial drains with intracranial pressure monitor. Kept intubated with a neurochecks with stable hemodynamics. 1/11 -patient having small amount of drainage from decompressive cranial drain. ICPs about 6- 9 mmHg per monitor. Patient on 40 of propofol. Withdrawing to pain in all 4 extremities. Pupils 2 mm minimally reactive bilaterally. Will start levo to maintain -160.    1/12 - Pt intubated, sedated on 2mg/h of Versed. Withdrawing from pain x 4 ext. Pupils 3mm BL, minimally reactive, sluggish. Corneal and cough reflex intact. Blood pressure well controlled with nicardipine gtt. 1/13 - Patient did have episode on decreased responsiveness yesterday about noon; neuro changes with enlarged unequal pupils. Stat CT yesterday showed no significant interval change from post-evacuation. CT this AM showed \"Interval decrease in the amount of blood identified within the fourth ventricle.  Otherwise stable appearance of the brain. \"    1/14 -MRI yesterday showed significant ischemia bilaterally, as well as vasogenic edema. Had a discussion yesterday evening with family. They likely will want to withdraw care. Did decide to make patient DNR CCA. Patient had no change overnight. Sodium 147. To continue 3%. 1/15 - Patient did require emergent bronchoscopy, as well as tube exchange yesterday afternoon, due to tube malpositioning. No significant events overnight. Na 150 this morning, 3% saline on hold. Will discuss goals of care again today with family. Past Medical History:   CVA, DVT on warfarin with IVC filter, hyperlipidemia, hypertension, PFO, nephrolithiasis, lymphedema, sciatica. Family History: Mother-diabetes. Father-colon cancer, heart disease. Social History:     Former smoker, social alcohol    ROS Review of Systems   Unable to perform ROS: Intubated        Scheduled Meds:   insulin lispro  0-6 Units Subcutaneous Q6H    chlorhexidine  15 mL Mouth/Throat BID    ceFAZolin (ANCEF) IVPB  3 g Intravenous Q8H    famotidine  20 mg Oral BID    calcium replacement protocol   Other RX Placeholder    atorvastatin  40 mg Per NG tube Nightly    [Held by provider] losartan  100 mg Oral Daily    And    [Held by provider] hydroCHLOROthiazide  12.5 mg Oral Daily    levetiracetam  1,000 mg Intravenous Q12H    sodium chloride flush  10 mL Intravenous 2 times per day    metoprolol tartrate  25 mg Oral BID     Continuous Infusions:   sodium chloride Stopped (01/15/21 0520)    mannitol Stopped (01/15/21 0036)    norepinephrine 1 mcg/min (01/12/21 1354)    midazolam Stopped (01/12/21 2000)    dextrose      niCARdipine Stopped (01/14/21 0945)    propofol 30 mcg/kg/min (01/15/21 0506)    fentaNYL 5 mcg/ml in 0.9%  ml infusion Stopped (01/10/21 2030)       PHYSICAL EXAMINATION:  Vitals:    01/15/21 0415 01/15/21 0421 01/15/21 0445 01/15/21 0500   BP: 122/79   124/79   Pulse: 91  93 91   Resp: 26  28 25   Temp: 100.4 °F (38 °C) TempSrc: Bladder      SpO2: 96%      Weight:  296 lb 11.8 oz (134.6 kg)     Height:            In: 3795   Out: 3581 [Urine:3370; Drains:211]     Body mass index is 39.15 kg/m². GCS:   No data recorded     Ventilator Settings:   PCV   PIP: 26  PEEP: 6  Rate: 20  FiO2: 30%      Physical Exam  HENT:      Head:      Comments: EVD and decompressive drains. Right Ear: External ear normal.      Left Ear: External ear normal.      Nose: Nose normal.      Mouth/Throat:      Mouth: Mucous membranes are moist.      Pharynx: Oropharynx is clear. Eyes:      Conjunctiva/sclera: Conjunctivae normal.      Pupils: Pupils are equal.      Right eye: Pupil is not reactive. Pupil is not sluggish. Left eye: Pupil is not reactive. Pupil is not sluggish. Comments: Pupils 3 mm bilaterally, nonreactive   Neck:      Musculoskeletal: No neck rigidity. Cardiovascular:      Rate and Rhythm: Normal rate and regular rhythm. Pulses: Normal pulses. Heart sounds: Normal heart sounds. No murmur. No gallop. Pulmonary:      Effort: Pulmonary effort is normal. No respiratory distress. Breath sounds: Normal breath sounds. No wheezing, rhonchi or rales. Abdominal:      General: Abdomen is flat. Bowel sounds are normal. There is no distension. Palpations: Abdomen is soft. Musculoskeletal:      Right lower leg: No edema. Left lower leg: No edema. Lymphadenopathy:      Cervical: No cervical adenopathy. Skin:     General: Skin is warm and dry. Capillary Refill: Capillary refill takes less than 2 seconds. Coloration: Skin is not jaundiced. Findings: No bruising or rash. Neurological:      GCS: GCS eye subscore is 1. GCS verbal subscore is 1. GCS motor subscore is 2. Comments: Decerebrate posturing to pain. Breathing over vent. Data Review: (All radiographs, tracings, PFTs, and imaging are personally viewed and interpreted unless otherwise noted).       Sodium 149, potassium 3.6, chloride 116, CO2 26, BUN 17, creatinine 0.9, glucose 132.  WBC 7.3, hemoglobin 9.6, hematocrit 30.1, platelet count 841   Telemetry shows normal sinus rhythm   Bio fire negative.  MRI 1/13 showed developing infarct in the left temporal lobe, in addition to the known right infarct and hemorrhage. Seen with multidisciplinary ICU team yes. Meets Continued ICU Level Care Criteria:    [x] Yes   [] No - Transfer Planned to listed location:  [] HOSPITALIST CONTACTED-      Case and plan discussed with Dr. Hall Going    Electronically signed by Paige Reyes MD on 1/15/2021 at 6:03 AM    Patient seen by me. Case discussed with resident physician. Family aware of devastating strokes. Family indicates patient would not want ongoing care with the devastating results of the strokes. He plans to withdraw but is waiting for son to arrive from [place] for withdrawing. Anticipated arrival is Saturday. .  CC time 35 minutes. Time was discontiguous. Time does not include procedures. Time does include my direct assessment of the patient and coordination of care.   Electronically signed by Huber Galvan MD on 1/15/2021 at 8:23 AM

## 2021-01-15 NOTE — PROGRESS NOTES
Addendum by Dr. Janice Caruso MD:  I have seen and examined the patient independently. Face to face evaluation and examination was performed. The below   evaluation and note have been reviewed. Labs and radiographs were reviewed. I Have discussed with Neurosurgery PA about this patient in detail. Also I discussed the case in detail with the ICU team and patient's nurse. The above assessment and plan have been reviewed. Please see my modifications mentioned below. My additional comments and modifications:  · Patient is Still intubated not sedated. ·  GCS 3T  · Pupils equal but not reactive   · Has cough reflex  · Has corneal reflex.  -Patient family were discussed regarding patient current neurological status and the expected prognosis. I was  emphasized to them that based on patient current medical status and the finding in his brain MRI we believe (both neurosurgery team and ICU team)  that patient suffered from shower embolic phenomenon with Paradoxical embolism from PFO is suspected. 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 -Given patient current neurological status and the findings of his new brain MRI, the prognosis is poo especially with poor quality of life if he survives. -Patient family stated that they want proceed with the option of withdraw the care as patient would not want them going care with the results that associated with possible poor quality of life. Since family stated that they are planning to withdraw the care tomorrow after another family members come in. For this reason, neurosurgery will sign off on this patient. Call me if you have any further questions or concern regarding this patient.     Janice Caruso MD       Neurosurgery Progress Note    Patient:  Cadence Dugan      Unit/Bed:4D-16/016-A    YOB: 1965    MRN: [id number]     Acct: [de-identified]     Admit date: 1/7/2021    Chief Complaint   Patient presents with   [facility] Chest Pain       Patient Seen, Chart, Physician notes, Labs, Radiology studies reviewed. Subjective: The patient is seen and evaluated in the ICU setting with evaluation and exam findings discussed with Dr. Anju Tristan. Patient remains postoperative day #5 following venotomy for evacuation of hematoma. Past, Family, Social History unchanged from admission. Diet:  DIET TUBE FEED CONTINUOUS/CYCLIC NPO; Semi-elemental; Orogastric; Continuous; 20; 60    Medications:  Scheduled Meds:   insulin lispro  0-6 Units Subcutaneous Q6H    chlorhexidine  15 mL Mouth/Throat BID    ceFAZolin (ANCEF) IVPB  3 g Intravenous Q8H    famotidine  20 mg Oral BID    calcium replacement protocol   Other RX Placeholder    atorvastatin  40 mg Per NG tube Nightly    [Held by provider] losartan  100 mg Oral Daily    And    [Held by provider] hydroCHLOROthiazide  12.5 mg Oral Daily    levetiracetam  1,000 mg Intravenous Q12H    sodium chloride flush  10 mL Intravenous 2 times per day    metoprolol tartrate  25 mg Oral BID     Continuous Infusions:   sodium chloride Stopped (01/15/21 0520)    mannitol 50 g (01/15/21 0739)    norepinephrine 1 mcg/min (01/12/21 1354)    midazolam Stopped (01/12/21 2000)    dextrose      niCARdipine Stopped (01/14/21 0945)    propofol 40 mcg/kg/min (01/15/21 0636)    fentaNYL 5 mcg/ml in 0.9%  ml infusion Stopped (01/10/21 2030)     PRN Meds:acetaminophen, acetaminophen, fentanNYL, glucose, dextrose, glucagon (rDNA), dextrose, potassium chloride, hydrALAZINE, promethazine, promethazine **OR** ondansetron, sodium chloride flush, polyethylene glycol    Objective: Patient is lying in bed with the head of the bed elevated approximately 30 degrees and remains intubated and sedated. Intubation level of sedation limits physical exam.  He is largely nonresponsive today on exam with decerebrate posturing noted.   Blood pressure was controlled at 110/72 at the time of exam.  The drains are as per the Radiologist  Radiology: Echo Transesophageal    Result Date: 1/8/2021  Transesophageal Echocardiography Report (DARINEL)   Demographics   Patient Name   Chandler Feliz Gender              Male   MR #           [id number]    Race                                               Ethnicity   Account #      [de-identified]    Room Number         0006   Accession      [id number]   Date of Study       01/08/2021  Number   Date of Birth  1965   Referring Physician Mayo Tay MD   Age            60 year(s)   Yessy Hager MD                              Physician  Procedure Type of Study   DARINEL procedure:ECHOCARDIOGRAM TRANSESOPHAGEAL. Procedure Date Date: 01/08/2021 Start: 03:38 PM Study Location: Endoscopy Indications:Patent foramen ovale (PFO). Additional Medical History: Former smoker, IVC filter, hypertension, hyperlipidemia, history of DVT and PE Patient Status: Routine Contrast Medium: Bubble Study. Height: 74 inches Weight: 270.01 pounds BSA: 2.47 m^2 BMI: 34.67 kg/m^2 BP: 142/94 mmHg Procedure Medications   - Fentanyl I.V. 150 mcg.   - Versed I.V. 3 mg. Procedure Descriptor:-The transesophageal approach was used. -Probe inserted with no complications by cardiologist. -The study included complete 2D imaging, complete spectral doppler, and color doppler. -Procedure performed without complications. Conclusions   Summary  Ejection fraction was estimated at 55-60%. ATRIAL SEPTUM: + PFO with R to L shunting with increased intracardiac  pressures. There was trace mitral regurgitation.    Signature   ----------------------------------------------------------------  Electronically signed by Narciso Tay MD (Interpreting  physician) on 01/08/2021 at 04:28 PM ----------------------------------------------------------------   Findings   Mitral Valve  The mitral valve structure was normal with normal leaflet separation. DOPPLER: The transmitral velocity was within the normal range with no  evidence for mitral stenosis. There was trace mitral regurgitation. Aortic Valve  The aortic valve was trileaflet with normal thickness and cuspal  separation. There was no echocardiographic evidence of a vegetation. DOPPLER: Transaortic velocity was within the normal range with no evidence  of aortic stenosis or regurgitaiton. Tricuspid Valve  The tricuspid valve structure was normal with normal leaflet separation. There was no echocardiographic evidence of a vegetation. DOPPLER: There  was no evidence of tricuspid stenosis or regurgitation. Pulmonic Valve  The pulmonic valve leaflets exhibited normal thickness, no calcification,  and normal cuspal separation. There was no echocardiographic evidence of  vegetation. DOPPLER: The transpulmonic velocity was within the normal  range with no evidence for regurgitation. Left Atrium  Left atrial size was normal with no thrombus identified. APPENDAGE: The  left atrial appendage size was normal with no thrombus identified. DOPPLER: The function was normal (normal emptying velocity). Left Ventricle  Normal left ventricular size and systolic function. There were no regional wall motion abnormalities. Wall thickness was within normal limits. Ejection fraction was estimated at 55-60%. Right Atrium  Right atrial size was normal with no thrombus identified. ATRIAL SEPTUM: +  PFO with R to L shunting with increased intracardiac pressures. Right Ventricle  The right ventricular size was normal with normal systolic function and  wall thickness. Pericardial Effusion  The pericardium was normal in appearance with no evidence of a pericardial  effusion. Pleural Effusion  No evidence of pleural effusion.    Aorta / Great Vessels -Aortic root dimension within normal limits.  -The Pulmonary artery is within normal limits. -IVC size is within normal limits with normal respiratory phasic changes. Procedure Descriptor  -The transesophageal approach was used. -Probe inserted with no complications by cardiologist.  -The study included complete 2D imaging, complete spectral doppler, and  color doppler.  -Procedure performed without complications. [email or web address] [id number]    Echo Complete 2d W Doppler W Color    Result Date: 1/7/2021  Transthoracic Echocardiography Report (TTE)  Demographics   Patient Name    Silvina Lagos Gender               Male   MR #            [id number]    Race                                                 Ethnicity   Account #       [de-identified]    Room Number          0006   Accession       [id number]   Date of Study        01/07/2021  Number   Date of Birth   1965   Referring Physician  Brad Baron MD   Age             60 year(s)   Sonographer          Carmen Belle, [facility],                                                    RVT                                Interpreting         Echo reader of the week                               Physician            Alaina Oquendo MD  Procedure Type of Study   TTE procedure:ECHOCARDIOGRAM COMPLETE 2D W DOPPLER W COLOR. Procedure Date Date: 01/07/2021 Start: 09:22 AM Study Location: Bedside Technical Quality: Adequate visualization Indications:Chest pain. Additional Medical History:chest pain, DVT, hypertension, hyperlipidemia Patient Status: Routine Height: 74 inches Weight: 270.01 pounds BSA: 2.47 m^2 BMI: 34.67 kg/m^2 BP: 150/95 mmHg  Conclusions   Summary  Normal left ventricle size and systolic function. Ejection fraction was  estimated at 55 to 60 %.  There were no regional left ventricular wall  motion abnormalities and wall thickness was within normal limits. The left atrium is Mildly dilated. Signature   ----------------------------------------------------------------  Electronically signed by Micheal Wagoner MD (Interpreting  physician) on 01/07/2021 at 06:16 PM  ----------------------------------------------------------------   Findings   Mitral Valve  The mitral valve structure was normal with normal leaflet separation. DOPPLER: The transmitral velocity was within the normal range with no  evidence for mitral stenosis. Trace mitral regurgitation is present. Aortic Valve  The aortic valve was trileaflet with normal thickness and cuspal  separation. DOPPLER: Transaortic velocity was within the normal range with  no evidence of aortic stenosis. There was no evidence of aortic  regurgitation. Tricuspid Valve  The tricuspid valve structure was normal with normal leaflet separation. DOPPLER: There was no evidence of tricuspid stenosis. Mild tricuspid regurgitation visualized. Pulmonic Valve  The pulmonic valve leaflets exhibited normal thickness, no calcification,  and normal cuspal separation. DOPPLER: The transpulmonic velocity was  within the normal range with no evidence for regurgitation. Left Atrium  The left atrium is Mildly dilated. Left Ventricle  Normal left ventricle size and systolic function. Ejection fraction was  estimated at 55 to 60 %. There were no regional left ventricular wall  motion abnormalities and wall thickness was within normal limits. Right Atrium  Right atrial size was normal.   Right Ventricle  The right ventricular size was normal with normal systolic function and  wall thickness. Pericardial Effusion  The pericardium was normal in appearance with no evidence of a pericardial  effusion. Pleural Effusion  No evidence of pleural effusion.    Aorta / Great Vessels  -Aortic root dimension within normal limits.  -The Pulmonary artery is within normal limits. -IVC size is within normal limits with normal respiratory phasic changes. M-Mode/2D Measurements & Calculations   LV Diastolic   LV Systolic Dimension:    AV Cusp Separation: 2.1 cmLA  Dimension: 5   3.3 cm                    Dimension: 4.4 cmAO Root  cm             LV Volume Diastolic: 214  Dimension: 3.1 cmLA Area: 23.1  LV FS:34 %     ml                        cm^2  LV PW          LV Volume Systolic: 28.2  Diastolic: 1.3 ml  cm             LV EDV/LV EDV Index: 118  Septum         ml/48 m^2LV ESV/LV ESV    RV Diastolic Dimension: 3 cm  Diastolic: 1.2 Index: 78.3 ml/18 m^2  cm             EF Calculated: 62.6 %     LA/Aorta: 1.42                                            LA volume/Index: 74.5 ml /30m^2  Doppler Measurements & Calculations   MV Peak E-Wave: 73.3 cm/s  AV Peak Velocity: 116 LVOT Peak Velocity: 98.5  MV Peak A-Wave: 74.4 cm/s  cm/s                  cm/s  MV E/A Ratio: 0.99         AV Peak Gradient:     LVOT Peak Gradient: 4  MV Peak Gradient: 2.15     5.38 mmHg             mmHg  mmHg                                                   TV Peak E-Wave: 54.2 cm/s  MV Deceleration Time: 278                        TV Peak A-Wave: 29 cm/s  msec  MV P1/2t: 81 msec                                TV Peak Gradient: 1.18  MVA by PHT:2.72 cm^2                             mmHg                                                   TR Velocity:251 cm/s  MV E' Septal Velocity: 7.5 AV DVI (Vmax):0.85    TR Gradient:25.2 mmHg  cm/s                                             PV Peak Velocity: 101  MV A' Septal Velocity:                           cm/s  10.2 cm/s                                        PV Peak Gradient: 4.08  MV E' Lateral Velocity:                          mmHg  7.6 cm/s  MV A' Lateral Velocity:  13.5 cm/s  E/E' septal: 9.77  E/E' lateral: 9.64  [email or web address] [id number]%3d%3d    Ct Head Wo Contrast    Result Date: 1/13/2021  CT head without contrast Comparison:  CT,[facility]  - CT HEAD WO CONTRAST  - 01/12/2021 12:10 PM EST Findings: There is slight decrease in the amount of intraventricular blood in the fourth ventricle since the previous exam.  There is a persistent large amount of intraventricular blood within the lateral and third ventricles. There is stable right parietal intraparenchymal and subarachnoid hemorrhage. There is stable midline shift to the left and pneumocephalus. A catheter tip remains within the anterior horn of the right lateral ventricle. There is stable bilateral ventricular prominence. There are stable areas of low attenuation throughout the right parietal lobe and bilateral frontal lobes as well as within the right basal ganglia region and the corpus callosum. No new areas of hemorrhage or infarct are identified. There is a small amount of fluid in the left maxillary sinus. There is mucosal thickening within the ethmoid air cells and sphenoid sinus bilaterally. The right frontoparietal craniotomy defect is unchanged in appearance. Impression: 1. Interval decrease in the amount of blood identified within the fourth ventricle. Otherwise stable appearance of the brain. This document has been electronically signed by: Edwar Bernabe MD on 01/13/2021 05:35 AM All CT scans at this facility use dose modulation, iterative reconstruction, and/or weight-based dosing when appropriate to reduce radiation dose to as low as reasonably achievable. Ct Head Wo Contrast    Result Date: 1/12/2021  PROCEDURE: CT HEAD WO CONTRAST CLINICAL INFORMATION: intrap bleed. COMPARISON: 1/10/2021 TECHNIQUE: Noncontrast 5 mm axial images were obtained through the brain. All CT scans at this facility use dose modulation, iterative reconstruction, and/or weight-based dosing when appropriate to reduce radiation dose to as low as reasonably achievable. FINDINGS: Endotracheal and OG catheter. Recent right-sided craniotomy. Ventricular drain is in place. Tip of the drain is in the frontal horn of the right lateral ventricle. There is pneumocephalus along the surgical site greatest along the right frontal convexity. Patient has recently undergone a surgical evacuation of intraparenchymal hematoma in the right frontoparietal region. Redemonstrated is subarachnoid blood greatest in the high right frontal parietal lobe sulci. Edema and hypoattenuation right frontal parietal lobe in keeping with evolving infarction. Some packing material at the craniotomy site is also present. There is moderate amount of intraventricular blood with right to left shift of 7 mm. This is similar to prior study. Blood extending into the fourth ventricle. Temporal horns are prominent similar to prior study secondary to mass effect. Right scalp hematoma and postsurgical change. Partially opacified paranasal sinuses. IMPRESSION: 1. No significant interval change in postoperative changes with pneumocephalus and subarachnoid blood in the right frontal parietal lobe towards the vertex. 2. Moderate amount of intraventricular hemorrhage with dilatation of the temporal horns similar in appearance to prior study. There is a 7 mm right to left midline shift, similar to prior. 3. Evolving right frontal parietal infarction. Continued close follow-up is advised. **This report has been created using voice recognition software. It may contain minor errors which are inherent in voice recognition technology. ** Final report electronically signed by Dr. Eyal Brown on 1/12/2021 12:31 PM    Ct Head Wo Contrast    Result Date: 1/10/2021  CT head without contrast. COMPARISON: CT of the head dated 1/10/2021 at 11:07 AM FINDINGS: Right frontoparietal craniotomy with ventricular drain present within the anterior horn of the right lateral ventricle and surgical drain present within the superior right frontal/parietal lobe, stable in position from prior imaging. Small amount of pneumocephalus and intraventricular air, decreased from prior imaging. Stable appearance of moderate intraventricular blood products present within the lateral third and fourth ventricles. Stable appearance of intraparenchymal hemorrhage within the right frontal parietal lobe with overlying scattered pockets of subarachnoid hemorrhage. Edema within the right frontoparietal lobe with loss of gray-white matter differentiation which appears more confluent than on prior imaging and effaces the sulci of the adjacent brain. Stable right to left midline shift by 6 mm. Stable enlargement of the temporal horns of the lateral ventricles. Similar appearance of old infarct within the left caudate and along the superior left frontal lobe. Mastoid air cells and paranasal sinuses are clear. Interval increase in scalp edema overlying the superior frontal calvarium. 1.  Vasogenic edema with loss of gray-white matter differentiation within the right superior frontal lobe appears more confluent than on prior imaging, consistent with developing infarct. 2.  Decreased pneumocephalus. 3.  Stable right to left midline shift by 6 mm. Stable interventricular, intraparenchymal and subarachnoid hemorrhage. 4. Interval increase in scalp edema/hematoma overlying the superior frontal calvarium. This document has been electronically signed by: Sanchez Salcido MD on 01/10/2021 11:28 PM All CT scans at this facility use dose modulation, iterative reconstruction, and/or weight-based dosing when appropriate to reduce radiation dose to as low as reasonably achievable. Ct Head Wo Contrast    Result Date: 1/10/2021  PROCEDURE: CT HEAD WO CONTRAST CLINICAL INFORMATION: on the way to the ICU, Post craniectomy  with EVD placement. Craniotomy. EVD placement. COMPARISON: Head CT 1/10/2021. TECHNIQUE: Noncontrast 5 mm axial images were obtained through the brain. Sagittal and coronal reconstructions were obtained.  All CT scans at this facility use dose modulation, iterative reconstruction, and/or weight-based dosing when appropriate to reduce radiation dose to as low as reasonably achievable. FINDINGS: The patient has undergone a right-sided craniotomy. There is a ventricular drain in place. The tip is in the frontal horn of the right lateral ventricle. There is right frontal pneumocephalus. There is some pneumocephalus in the frontal horn of the right lateral ventricle. There is some pneumocephalus at the posterior right frontal resection site. The patient has undergone surgical evacuation of the parenchymal hematoma in the right frontoparietal location. There is a small amount of residual products in the sulci. There is some packing material and a surgical drain at the surgical site. There is  loss of gray-white matter differentiation in the posterior right frontal lobe and right parietal lobe consistent with an infarct. This low-attenuation appears new compared to prior CT. There is moderate intraventricular hemorrhage. This involves the third ventricle, fourth ventricle and lateral ventricles. There is blood seen in the cerebral aqueduct. The temporal horns are dilated. The hydrocephalus is new compared to the head CT from  1/10/2021 at 12:00 AM.  There is an old infarct in the caudate nucleus on the left. This is stable in appearance. Midline shift to the left measures 6 mm. The patient is intubated and has an orogastric tube in place. The paranasal sinuses are normally aerated. There are vascular calcifications. There is no suspicious calvarial abnormality. 1. Status post surgical evacuation of the hematoma in the right frontoparietal location. 2. Interval development of loss of the gray-white matter differentiation in the posterior right frontal lobe and the right parietal lobe consistent with an infarct. 2. Moderate hydrocephalus with a ventriculostomy catheter in place. There is moderate intraventricular blood.  **This report has been created using voice recognition software. It may contain minor errors which are inherent in voice recognition technology. ** Final report electronically signed by Dr. Saravanan Crockett on 1/10/2021 12:12 PM    Ct Head Wo Contrast    Result Date: 1/10/2021    1. Interval increase in size of intraparenchymal hemorrhage in the right frontal/parietal lobes with new right to left midline shift of 3 mm. There is now enlargement of the left lateral ventricle likely representing a component of obstructive hydrocephalus. 2.  Interval development of intraventricular hemorrhage within the lateral, third and fourth ventricles. 3.  Interval increase in the amount of subarachnoid hemorrhage along the superior right frontal lobe. This document has been electronically signed by: Marisol Diehl MD on 01/10/2021 04:53 AM All CT scans at this facility use dose modulation, iterative reconstruction, and/or weight-based dosing when appropriate to reduce radiation dose to as low as reasonably achievable. Ct Head Wo Contrast    Result Date: 1/10/2021      1. Right frontal lobe intraparenchymal hemorrhage measuring up to 3.8 cm. Minimal local mass-effect. No midline shift. 2.  Scattered areas of subarachnoid hemorrhage along the superior right frontal lobe. This document has been electronically signed by: Marisol Diehl MD on 01/10/2021 12:19 AM All CT scans at this facility use dose modulation, iterative reconstruction, and/or weight-based dosing when appropriate to reduce radiation dose to as low as reasonably achievable. Ct Chest Wo Contrast    Result Date: 1/9/2021  PROCEDURE: CT CHEST WO CONTRAST CLINICAL INFORMATION: Prep for open heart surgery, COMPARISON: No prior study.  TECHNIQUE: Multiple axial 5 millimeter images of the thorax and upper abdomen were obtained without the administration of intravenous contrast material . All CT scans at this facility use dose modulation, iterative reconstruction, and/or of the left posterior cerebral artery with slightly diminished flow The internal carotid arteries are normal. The ophthalmic artery origins are normal. The anterior cerebral arteries are normal. The middle cerebral arteries and their proximal branches are normal. There is a normal anterior communicating artery. There is flow in the superior sagittal sinus. .      1. Probable fetal origin of the left posterior cerebral artery slightly diminished flow in the left posterior cerebral artery and 2. Otherwise grossly negative MRA of the brain. 3. Normal flow in the superior sagittal sinus. **This report has been created using voice recognition software. It may contain minor errors which are inherent in voice recognition technology. ** Final report electronically signed by DR Carolin Goodrich on 1/13/2021 2:59 PM    Xr Chest Portable    Result Date: 1/14/2021  PROCEDURE: XR CHEST PORTABLE CLINICAL INFORMATION: ET and OG placement COMPARISON: 1/10/2021 TECHNIQUE: A single mobile view of the chest was obtained. 1. Poor inflation lungs. Normal heart size. ET tube, NG tube, and right jugular line all appear to be in good position. 2. Moderate left basilar atelectasis/pneumonia. Mild infiltrate along right hemidiaphragm. 3. Soft tissue density along the lateral aspect of the right hemithorax, likely representing pleural thickening. There are several old healed rib fractures right side and a few on the left. **This report has been created using voice recognition software. It may contain minor errors which are inherent in voice recognition technology. ** Final report electronically signed by Dr. Emma Lay on 1/14/2021 9:06 AM    Xr Chest Portable    Result Date: 1/10/2021  1 view of the chest. COMPARISON: Single view of the chest dated 1/10/2021 at 1:03 AM FINDINGS: Interval intubation with endotracheal tube terminates 3.9 cm above the joy. Enteric tube extends into the abdomen with tip and sidehole outside the field of view. Right IJ central venous line tip overlies expected location of the cavoatrial junction. Low lung volumes. Elevation of the right hemidiaphragm. Crowding of the bronchovascular markings likely secondary to low lung volumes. Hazy opacity along the left midlung, similar to prior imaging. No definite pleural effusion. No pneumothorax. Old healed right lateral rib fractures. No displaced fracture. 1.  Endotracheal tube terminates properly 3.9 cm above the joy. 2.  Enteric tube (OG/NG) extends into the abdomen with tip and sidehole outside the field of view. 3.  Low lung volumes. This document has been electronically signed by: Galina White MD on 01/10/2021 05:00 AM     Xr Chest Portable    Result Date: 1/10/2021  1 view of the chest. COMPARISON: Single view of the chest dated 1/10/2021 at 1:03 AM FINDINGS: Interval intubation with endotracheal tube terminating approximately 3.1 cm above the joy. Interval placement of enteric tube which extends into the abdomen, tip and sidehole outside the field of view. Right IJ central venous line tip overlies the expected location of the distal SVC, stable. Low lung volumes. Elevation of the right hemidiaphragm. Normal heart size. Crowding of bronchovascular markings, likely secondary to low lung volumes. Hazy airspace opacity along the left mid lung. No definite pleural effusion. No pneumothorax. No displaced fracture. Old healed right lateral rib fractures. 1.  Endotracheal tube terminates appropriately 3.1 cm above the joy. 2.  Low lung volumes. This document has been electronically signed by: Galina White MD on 01/10/2021 03:57 AM     Xr Chest Portable    Result Date: 1/7/2021  Exam: 1V chest Comparison:  CR,SR  - XR CHEST STANDARD (2 VW)  - 07/26/2018 12:38 PM EDT Findings: Mediastinum: No cardiac silhouette enlargement. Lungs:Stable elevated right hemidiaphragm. No infiltrate or mass. Pleura: No pneumothorax or significant effusion.  Bones: No acute pathology. Remote bilateral rib fractures. Impression: No acute pathology. This document has been electronically signed by: Binh Mcgraw MD on 01/07/2021 02:19 AM     Loreta Luis Carotid Bilateral    Result Date: 1/11/2021  PROCEDURE: LORETA DUP CAROTID BILATERAL CLINICAL INFORMATION: Preop open heart surgery; history of coronary artery disease and hypertension. COMPARISON: No prior study. TECHNIQUE: Grayscale and color Doppler sonographic imaging of the extracranial carotid arteries performed in longitudinal and transverse planes. TECHNICAL DATA: RIGHT PSV/EDV DIST CCA-------->87/13cm/s PROX ICA-------->103/11cm/s ECA---------------->141/17cm/s VERT-------------->27/6cm/s LEFT PSV/EDV DIST CCA-------->119/21cm/s PROX ICA-------->94/21cm/s ECA---------------->101/13cm/s VERT-------------->68/17cm/s FINDINGS: RIGHT: Right common carotid artery: Unremarkable. Right carotid bulb/internal carotid artery:  1. There is mild hypoechoic and hyperechoic plaque at the junction of the right carotid bulb and proximal right internal carotid artery with grayscale and color imaging and flow velocities indicating less than 50% stenosis. Right external carotid artery: Unremarkable. Right vertebral artery:  Unremarkable with antegrade flow. LEFT: Left common carotid artery: Unremarkable. Left carotid bulb/internal carotid artery:  Unremarkable. Left external carotid artery: Unremarkable. Left vertebral artery:  Unremarkable with antegrade flow. 1. There is mild hypoechoic and hyperechoic plaque at the junction of the right carotid bulb and proximal right internal carotid artery with grayscale and color imaging and flow velocities indicating less than 50% stenosis. 2. There is no hemodynamically significant stenosis. **This report has been created using voice recognition software. It may contain minor errors which are inherent in voice recognition technology. ** Final report electronically signed by Dr. Robert Whitaker on 1/11/2021 8:14 AM    Vl Dup Lower Extremity Venous Bilateral    Result Date: 1/8/2021  PROCEDURE: VL DUP LOWER EXTREMITY VENOUS BILATERAL CLINICAL INFORMATION: At high risk for deep venous thrombosis COMPARISON: 7/26/2018 TECHNIQUE: Multiple grayscale and color flow images of the major veins of both lower extremities were obtained from the level of the groin to the level of the ankle. Multiple compression and augmentation maneuvers were performed. FINDINGS: RIGHT LOWER EXTREMITY: There is relatively normal flow and only partial compression of the right popliteal vein which appears contain a small amount of chronic mural thrombus. All of the other deep veins of the right lower extremity are widely patent with normal flow and normal compressibility. Note that there is reflux in the peroneal and popliteal veins, indicative of valvular insufficiency. LEFT LOWER EXTREMITY:All the  deep veins of the left lower extremity are widely patent with normal flow and normal compressibility. There is reflux in the left peroneal and popliteal veins, indicative of valvular insufficiency. 1. Reflux in the popliteal and peroneal veins bilaterally, indicative of valvular insufficiency. 2. Mild chronic mural thickening in the right popliteal vein. 3. No evidence for an acute or occlusive process. **This report has been created using voice recognition software. It may contain minor errors which are inherent in voice recognition technology. ** Final report electronically signed by Dr. Daylin King on 1/8/2021 12:49 PM    Xr Abdomen For Ng/og/ne Tube Placement    Result Date: 1/10/2021  1 view of the abdomen. COMPARISON: None FINDINGS: Enteric tube extends into the abdomen with tip and sidehole overlying the expected location of the stomach body. Cholecystectomy clips. IVC filter present to the right of L1/L2. Nonobstructive bowel gas pattern. Elevation of the right hemidiaphragm.      1.  Enteric tube (NG/OG) tip and sidehole overlie the expected location of the stomach body. This document has been electronically signed by: Andrae Ingram MD on 01/10/2021 03:01 AM     Vl Pre Op Vein Mapping    Result Date: 1/11/2021  PROCEDURE:  PRE OP VEIN MAPPING CLINICAL INFORMATION: Preop open heart surgery. COMPARISON: No prior study. TECHNIQUE: FINDINGS: RIGHT VEIN SFJ --------------------->3.8 mm GSV MID --------------> 1.9 mm GSV ABV KNEE ----------> 1.5 mm GSV BEL KNEE ----------> 1.6 mm GSV MID CALF -----------> 1.6 mm GSV DIST CALF ----------> 1.2 mm GSV MED MALL ----------> 2.3 mm LSV PROX --------------> 1.8 mm LSV MID -----------------> 1.4 mm LSV DIST ----------------> 1.9 mm LEFT VEIN SFJ -------------------->6.4 mm GSV MID --------------> 2.0 mm GSV ABV KNEE ----------> 1.2 mm GSV BEL KNEE ----------> 4.9 mm GSV MID CALF -----------> 4.5 mm GSV DIST CALF ----------> 4.5 mm GSV MED MALL ----------> 3.7 mm LSV PROX -------------> 2.5 mm LSV MID ----------------> 1.9 mm LSV DIST --------------> 4.0 mm Incidental note made of varicose veins within the left calf. 1. Venous mapping of the bilateral lower extremities. 2. Incidental note made of varicose veins within the left calf. **This report has been created using voice recognition software. It may contain minor errors which are inherent in voice recognition technology. ** Final report electronically signed by Dr. Tricia Barajas on 1/11/2021 8:17 AM    Mri Brain Wo Contrast    Result Date: 1/13/2021  PROCEDURE: MRI BRAIN WO CONTRAST CLINICAL INFORMATION stroke. COMPARISON: CT scan of the brain obtained on the same day at 04.48 hours.  TECHNIQUE: Multiplanar and multiple spin echo MRI images were obtained of the brain without contrast. FINDINGS: There is extensive restricted diffusion  involving the right frontal and parietal cortex, right occipital lobe, right posterior temporal lobe, right medial temporal lobe, splenium of corpus callosum, left frontal and temporal lobes and adjacent to the left sylvian fissure. These correspond to hypodensity seen on CT scan and are suggestive of areas of infarction. There is extensive intraventricular hemorrhage within the right lateral ventricle and in the dependent portion of the left lateral ventricle,  within the fourth ventricle and in the subarachnoid spaces especially over the right frontal lobe seen better on CT scan. There is an intraventricular shunt entering the calvarium in the right frontal lobe. There is a second catheter entering the calvarium in the right parietal region There are postoperative changes involving the right frontal calvarium . There is mass effect upon the right lateral ventricle and moderate midline shift towards the left side, fluid collections especially over the right frontal and temporal and left posterior temporal calvarium. There is increased signal intensity in the ethmoid air cells, maxillary sinuses, sphenoid sinuses and mastoid air cells bilaterally. There is increased signal intensity in the nasopharynx. 1. Extensive intraventricular hemorrhage in the right and left lateral ventricles, third ventricle and fourth ventricle. Parenchymal hemorrhage in the right frontal and parietal lobes and subarachnoid hemorrhage over the right frontal lobe. 2. Intraventricular shunt entering the calvarium in the right frontal region with tip in the frontal horn of the right lateral ventricle. Craniotomy involving the right frontal calvarium 3. Second shunt entering the calvarium in the right parietal region. 4. Extensive areas of restricted diffusion throughout the right frontal, parietal, temporal and occipital lobes, left frontal and temporal lobes and adjacent to the sylvian fissure to a lesser extent left parietal lobe and in the splenium of the corpus callosum suggestive of areas of infarction. 5. Mass effect upon the right lateral ventricle and midline deviation.  6. Increased fluid in the scalp especially over the right frontal and temporal left posterior temporal regions. 7. Inflammatory changes in ethmoid air cells, maxillary sinuses, sphenoid sinuses and mastoid air cells bilaterally. 8. Increased signal intensity in the nasopharynx. **This report has been created using voice recognition software. It may contain minor errors which are inherent in voice recognition technology. ** Final report electronically signed by DR Elia Luz on 1/13/2021 2:55 PM    A/P: S/P the patient is seen and evaluated in the ICU setting with evaluation and exam findings discussed and reviewed with Dr. Raul Diaz. Patient remains postoperative day #5 following craniotomy for evacuation of intracranial hemorrhage. Patient remained intubated and sedated limiting thorough physical exam and was largely nonresponsive on exam with decerebrate posturing noted. Pressure was controlled at 110/72 and the drains are intact and functioning with posterior drain output estimated at 220 cc following TPA injection yesterday. At the time of exam a CT scan was pending review. Neurosurgery anticipates discontinuation removal of posterior drain for this afternoon. EVD drain was intact and functioning is leveled at 0 at the time of exam.  Neurosurgery recommends EVD transduced hourly with ICPs reported. Neurosurgery to follow    Note: The posterior cervical drain was removed at bedside at 1324 this afternoon, without complication. 3 surgical staples were applied on and around the drain site with a pressure dressing applied and when dressed was dry. EVD site was redressed after being swabbed with Betadine and stockinette was reapplied. He was instructed to observe the site where the drain was removed and to change dressings as needed.     Electronically signed by Neal Raya PA-C on 1/15/2021 at 9:22 AM

## 2021-01-15 NOTE — PROGRESS NOTES
Comprehensive Nutrition Assessment    Type and Reason for Visit:  Reassess(TF check)    Nutrition Recommendations/Plan:   Plan Vital 1.2 TF with goal of 35 ml/hr & flush 1 ( 2.5 oz) liquid protein with current diprivan rate. Free H20 flush per Dr.  Per RN discussions of possible vent withdrawal.    Nutrition Assessment:     Pt. nutritionally compromised AEB NPO status, tolerating TF at goal &  discussion of possible withdrawal. At risk for further nutrition compromise r/t stroke, STEMI , reported CABG would be needed after neuro status stable,  s/p craniotomy with drains 1/10/21,  intubated and underlying medical condition (HTN, CVA, DVT, Lymphedema ).  Nutrition recommendations/interventions as per above. Malnutrition Assessment:  Malnutrition Status: At risk for malnutrition (Comment)    Context:  Acute Illness     Findings of the 6 clinical characteristics of malnutrition:  Energy Intake:  Unable to assess  Weight Loss:  No significant weight loss     Body Fat Loss:  No significant body fat loss     Muscle Mass Loss:  No significant muscle mass loss    Fluid Accumulation:  1 - Mild     Strength:  Not Performed    Estimated Daily Nutrient Needs:  Energy (kcal):  ~1721 kcals ( 14); Weight Used for Energy Requirements:  Admission(122.9 kg)     Protein (g):  ~109 grams ( 1.3); Weight Used for Protein Requirements:  Ideal        Fluid (ml/day):  per Dr;         Nutrition Related Findings:  Pt seen intubated, diprivan infusing at 29.7 ml/hr providing 784 kcals in lipid emulsion, TF infusing at 60 ml/hr, head wrapped , s/p crani 1/10. Last bm recorded early 1/8. Discussed pt with Dl Escobar RN who reports pt is tolerating TF at 60 ml/hr, aware no bm, & discuission of possible withdrawl tomorrow. As diprivan is suppling 784 kcals in lipid emulsion,  will decrease TF rate to 35 ml/hr & 2 protein modulars  & discussed this as well with RN. Na+ 150 this morning, 3% saline now 146, glucose 132, Hgb 9.6. MAP 91.  meds also include levophed, humalog, keppra, versed & bm med . Wounds:  Surgical Incision(pt s/p 1/10/21 Right-sided frontoparietal craniotomy and evacuation of large frontoparietal parenchymal hemorrhage)       Current Nutrition Therapies:    Current Tube Feeding (TF) Orders:  · Feeding Route: Orogastric  · Formula: Semi-Elemental(Vital 1.2)  · Schedule: Continuous(35 ml/hr)  · Additives/Modulars: (1 ( 2.5 oz) liquid protein bid)  · Water Flushes: per Dr  · Current TF & Flush Orders Provides: at goal  · Goal TF & Flush Orders Provides: 0816 kcals TF ( 2000 kcals with diprivan), 115 grams protein, 93 grams CHO, 681 ml free H20 in 840 ml TF/24 hours      Anthropometric Measures:  · Height: 6' 1\" (185.4 cm)  · Current Body Weight: 296 lb 11.8 oz (134.6 kg)(1/15 trace edema)   · Admission Body Weight: 270 lb 15.1 oz (122.9 kg)(1/8)    · Usual Body Weight: 270 lb 1 oz (122.5 kg)(11/6/20 per EMR)     · Ideal Body Weight: 184 lbs;   · BMI: 39.2  · BMI Categories: Obese Class 2 (BMI 35.0 -39.9)       Nutrition Diagnosis:   · Inadequate oral intake related to impaired respiratory function as evidenced by intubation      Nutrition Interventions:   Food and/or Nutrient Delivery:  Modify Tube Feeding  Nutrition Education/Counseling:  No recommendation at this time   Coordination of Nutrition Care:  Continue to monitor while inpatient, Interdisciplinary Rounds    Goals:  TF to provide % of nutrient needs while pt is intubated       Nutrition Monitoring and Evaluation:     Food/Nutrient Intake Outcomes:  Enteral Nutrition Intake/Tolerance  Physical Signs/Symptoms Outcomes:  Biochemical Data, Chewing or Swallowing, GI Status, Fluid Status or Edema, Hemodynamic Status, Nutrition Focused Physical Findings, Skin, Weight     Discharge Planning:     Too soon to determine     Electronically signed by Aviva Lesches, RD, LD on 1/15/21 at 11:44 AM EST    Contact: [phone]

## 2021-01-16 NOTE — FLOWSHEET NOTE
Spoke with [facility] nurse Janny Harrison. Patient's sons will be here tomorrow morning. One is in the [facility] and is flying in from [place]. He has not seen his father in \"some time\". Patient's family has generously opted for organ donation. Procurement is anticipated for 1/15  In the early evening. A large number of family members are expected to be present. They do have a , but additional support from [facility] may be needed. Let Xiomy Randall know we will have at least one  available if needed and to contact [facility]. Placing info on the green sheet for Saturday Staff.

## 2021-01-16 NOTE — PROGRESS NOTES
CRITICAL CARE PROGRESS NOTE      Patient:  Michael Gant    Unit/Bed:4D-16/016-A  YOB: 1965  MRN: [id number]   PCP: No primary care provider on file. Date of Admission: 1/7/2021  Chief Complaint:-Intraparenchymal hemorrhage    Assessment and Plan:    -Hemorrhagic stroke: S/p right craniotomy with evacuation of right intraparenchymal hematoma, placement of right decompressive cranial drain. MRI 1/13 showed significant ischemia bilaterally, as well as vasogenic edema. Cardene to target -160. Neuro checks, hypertonic saline 3% with sodium monitor every 2 hours, currently on hold for Na of 150. Aggressive supportive care include treatment of temp, electrolytes, and blood pressure control.  -Hypertensive emergency: Target SBP to be 140-160 with an A-line inserted in OR. Currently on Cardene gtt. -CAD: Multivessel disease on PCI 1/9.  -DVT hx: chronically on coumadin, started on Lovenox 1/9. Stopped secondary to #1, SCD currently. -Macrocytic anemia: Stable, monitor    -Hx CVA: previous CVA, no deficits, according to daughter PFO   -Hypernatremia- Will follow.  -Acute respiratory failure due to altered mental status with hypoxia/hypercapnia unspecified:requiring mechanical ventilation:  26/6, 30. Sedation: Propofol 40. To keep serum sodium between 140-150.  -DVT prophylaxis: Held secondary to head bleed. -  -CODE STATUS: DNR CCA.   The family is going to change his CODE STATUS to comfort care with terminal extubation today  GI prophylaxis: Pepcid, glycolax  Nutrition: TF @ 40cc/hr  Lines: EVD at 5mmHg, calvarial drain to gravity, ETT, Macias, NG, RIJ CVC      INITIAL H AND P AND ICU COURSE:  Michael Gant is a 60year old white male who presented to [address] [place] [address] on 1/7/2021 for chest pain 10/10 mid sternal radiates to jaw with SOB.  He has a past medical history DVT, hypertension, kidney stone, sciatica.  Per report patient woke up on the night of admission with 10 out of 10 chest pain.  Midsternal and crushing that radiated to the jaw with associated shortness of breath.  EMS was called and he was transferred to [facility].  Nitroglycerin did resolve chest pain. Silvia Fernandez was admitted to stepdown.  Cardiology was consulted and patient did undergo stress test and DARINEL on 1/8.  On 1/9 he did undergo heart cath that did show multivessel CAD and [facility] was consulted for possible plan for CABG.  Overnight code stroke was called.  When arriving a code stroke patient stated he had troubles moving his left side.  He did have facial droop.  He was very anxious.  Blood pressure was noted to be systolic greater than 150.  He was taken for emergency CT with plans on CTA of head and neck.  Upon completion of CT it was noted that patient had intercranial hemorrhage.  He was immediately brought to the ICU.  Stroke team did call with recommendations.  Dr. Lee Huffman called as the neurosurgeon on-call. On 1/10/2020. He underwent right craniotomy with evacuation of right intraparenchymal hematoma, placement of right decompressive cranial drains with intracranial pressure monitor. He was continued on the ventilator with a neurochecks with stable hemodynamics. Events Past 24 hours/ROS:  Patient remained on the ventilator  Noncommunicative and unresponsive  Low-grade temperature with a T-max of 100.4F. Off nicardipine drip  Sedated with fentanyl and propofol  Tolerating tube feeds  Good urine output  Review of systems performed    Past Medical History:   CVA, DVT on warfarin with IVC filter, hyperlipidemia, hypertension, PFO, nephrolithiasis, lymphedema, sciatica. Family History: Mother-diabetes. Father-colon cancer, heart disease. Social History:     Former smoker, social alcohol      Scheduled Meds:    Continuous Infusions:   midazolam Stopped (01/12/21 2000)    propofol 40 mcg/kg/min (01/16/21 1347)    fentaNYL 5 mcg/ml in 0.9%  ml infusion Stopped (01/10/21 2030)       PHYSICAL EXAMINATION:  VITALS:  BP 120/83   Pulse 111   Temp 100.4 °F (38 °C) (Bladder)   Resp 12   Ht 6' 1\" (1.854 m)   Wt 282 lb 6.6 oz (128.1 kg)   SpO2 93%   BMI 37.26 kg/m²   24HR INTAKE/OUTPUT:      Intake/Output Summary (Last 24 hours) at 1/16/2021 1439  Last data filed at 1/16/2021 0800  Gross per 24 hour   Intake 2050 ml   Output 2927 ml   Net -877 ml     Stool Output: Stool (measured) : 0 mL  Diet: No diet orders on file     Physical Exam   Constitutional:  Patient appears well built and well nourished. Patient remain on vent and sedated. Head: Normocephalic and atraumatic. Right Ear: External ear normal.   Left Ear: External ear normal.   Mouth/Throat: Exam is limited by endotracheal tube. Eyes: Pupils are equal and sluggishly reacting to light. Neck: Neck supple. No JVD present. Cardiovascular: Normal rate, regular rhythm, normal heart sounds. Pulmonary/Chest: Bilateral air entry present with good breath sounds. No respiratory distress on vent. No wheezes. No rales. Abdominal: Soft. Obese. patient exhibits no distension. No organomegaly appreciated. BS +ve sluggish. Musculoskeletal: Sedated and unresponsive. Extremities: No edema. No erythema  Vasculature: capillary refill < 3 seconds. .  Neurological:Sedated and unresponsive. Skin: Skin is warm and dry. Data Review: (All radiographs, tracings, PFTs, and imaging are personally viewed and interpreted unless otherwise noted).      Lab Results   Component Value Date    PH 7.43 01/16/2021    PCO2 42 01/16/2021    PO2 79 01/16/2021    HCO3 28 01/16/2021    O2SAT 96 01/16/2021     Lab Results   Component Value Date    IFIO2 30 01/16/2021    MODE PC 01/16/2021    SETPEEP 6.0 01/16/2021     CBC:   Recent Labs     01/15/21  0600 01/15/21  2243 01/16/21  0350   WBC 7.3 7.3 7.5   HGB 9.6* 8.9* 9.1*   HCT 30.1* 28.2* 28.6*    137 148     BMP:  Recent Labs     01/15/21  0600 01/15/21  0600 01/15/21  1620 01/15/21  2243 01/16/21  0350   *   < > 149* 147* 150* K 3.6  --   --  3.7 3.9   *  --   --  113* 115*   CO2 26  --   --  27 26   BUN 17  --   --  17 17   CREATININE 0.9  --   --  0.9 0.9   GLUCOSE 132*  --   --  122* 112*   MG  --   --   --  2.3 2.4   PHOS  --   --   --  3.9 4.4   CALCIUM 8.8  --   --  8.7 8.7    < > = values in this interval not displayed. Hepatic:   Recent Labs     01/15/21  2243 01/16/21  0350   AST 53* 51*   ALT 15 14   BILITOT 0.2* 0.3   ALKPHOS 29* 30*     Cardiac Enzymes: No results for input(s): CKTOTAL, CKMB, TROPONINI in the last 72 hours. BNP: No results for input(s): BNP in the last 72 hours. INR:   Recent Labs     01/15/21  0600 01/15/21  2243 01/16/21  0350   INR 1.18* 1.21* 1.25*     POC   Recent Labs     01/15/21  1152 01/15/21  1717 01/15/21  2312   POCGLU 114* 117* 112*     Recent Labs     01/15/21  2243 01/16/21  0350   LACTA 0.5 0.5    CT scan of chest with contrast performed today:  Sat Jan 16, 2021  4:46 AM   CT of the chest after administration of IV contrast.   Impression: Low lung volumes with areas of subsegmental atelectasis. CAT scan of abdomen and pelvis with contrast:  CT of the abdomen and pelvis after administration of IV contrast.   Impression: Multifocal scarring of the right kidney. Ultrasound negative so far including molecular panel for pneumonia by PCR.  Bio fire negative.  MRI 1/13 showed: Wed Jan 13, 2021  2:55 PM   PROCEDURE: MRI BRAIN WO CONTRAST   1. Extensive intraventricular hemorrhage in the right and left lateral ventricles, third ventricle and fourth ventricle. Parenchymal hemorrhage in the right frontal and parietal lobes and subarachnoid hemorrhage over the right frontal lobe. 2. Intraventricular shunt entering the calvarium in the right frontal region with tip in the frontal horn of the right lateral ventricle. Craniotomy involving the right frontal calvarium 3. Second shunt entering the calvarium in the right parietal region.  4. Extensive areas of restricted diffusion throughout the right frontal, parietal, temporal and occipital lobes, left frontal and temporal lobes and adjacent to the sylvian fissure to a lesser extent left parietal lobe and in the splenium of the corpus callosum suggestive of areas of infarction. 5. Mass effect upon the right lateral ventricle and midline deviation. 6. Increased fluid in the scalp especially over the right frontal and temporal left posterior temporal regions. 7. Inflammatory changes in ethmoid air cells, maxillary sinuses, sphenoid sinuses and mastoid air cells bilaterally. 8. Increased signal intensity in the nasopharynx. Meets Continued ICU Level Care Criteria:    [x] Yes     Critical care time provided:35 Minutes. The time was discontiguous.   Electronically signed by Angelita Sen MD on 1/16/2021 at 2:29 PM

## 2021-01-16 NOTE — FLOWSHEET NOTE
01/16/21 1017   Encounter Summary   Services provided to: Patient   Referral/Consult From: Rounding   Support System Spouse; Children   Continue Visiting Yes  (1/16 NR)   Complexity of Encounter Low   Length of Encounter 15 minutes   Routine   Type Follow up   Assessment Unable to respond   Intervention Prayer   In my follow up encounter with the 60  yr old patient, I attempted to see the patient but they were unresponsive and on the ventilator at this time. The pt was admitted due to chest pains/ coronary artery problems No family was present in the room. A  will attempt to see the patient at a later time as a follow up.

## 2021-01-16 NOTE — DISCHARGE SUMMARY
[facility]   Discharge/Death Summary      Patient Lila Mercado   MRN -  [id number]   [facility]      - 1965   60 y.o. Admission Date: 2021 by Dr. Dar Zamora MD    Death Date and Time: 2021 at 16:38PM    Cause of Death:   Cardiopulmonary Arrest.  Acute hemorrhagic stroke. Multi Vessel coronary artery disease. Hypertensive emergency    Disposition:      Autopsy Requested: No      Admitting Diagnosis:    Active Problems:    Chest pain, moderate coronary artery risk    Acute coronary syndromes (HCC)    Intraparenchymal hemorrhage of brain (HCC)    S/P evacuation of hematoma    Acute respiratory failure (HCC)    Intraventricular hemorrhage (HCC)    Chest pain  Resolved Problems:    * No resolved hospital problems. *  Acute hemorrhagic stroke. Multi Vessel coronary artery disease. Hypertensive emergency    Final Diagnosis / Assesment:   Active Problems:    Chest pain, moderate coronary artery risk    Acute coronary syndromes (HCC)    Intraparenchymal hemorrhage of brain (HCC)    S/P evacuation of hematoma    Acute respiratory failure (HCC)    Intraventricular hemorrhage (HCC)    Chest pain  Resolved Problems:    * No resolved hospital problems. *  Acute hemorrhagic stroke. Multi Vessel coronary artery disease.   Hypertensive emergency      Hospital Course: Eduardo Ruano is a 60year old white male who initially presented to [facility] on 2021 for evaluation of chest pain of10/10 mid sternal radiates to jaw with SOB.  He has a past medical history DVT, hypertension, kidney stone, sciatica.  Per report patient woke up on the night of admission with 10 out of 10 chest pain.  Midsternal and crushing that radiated to the jaw with associated shortness of breath.  EMS was called and he was transferred to [facility].  Nitroglycerin did resolve chest pain. [facility] was admitted to stepdown.  Cardiology was consulted and patient did undergo stress test and DARINEL on .  On  he did undergo heart cath that did show multivessel CAD and [facility] was consulted for possible plan for CABG.  Overnight code stroke was called.  When arriving a code stroke patient stated he had troubles moving his left side.  He did have facial droop.  He was very anxious.  Blood pressure was noted to be systolic greater than 549.  He was taken for emergency CT with plans on CTA of head and neck.  Upon completion of CT it was noted that patient had intercranial hemorrhage.  He was immediately brought to the ICU.  Stroke team did call with recommendations. Dr. Roseline Slade called as the neurosurgeon on-call. On 1/10/2020. He underwent right craniotomy with evacuation of right intraparenchymal hematoma, placement of right decompressive cranial drains with intracranial pressure monitor. He was continued on the ventilator with a neurochecks with stable hemodynamics. Sury Arriola MD  had a extensive family meeting with patient and family. At the end of the family meeting due to poor prognosis all the family members in view of patient's long-term wishes changing her CODE STATUS to DNR comfort care today. He was subsequently extubated with a terminal wean. Patient  at 16:38 8 PM.      I was informed by Ms. Ami Blas taking care of the patient that the loop/(organ donation) team denied patient for organ donation due to no available matching recipients.      Consultations:-  [x] Neuro -Dr. Rosalina Johnson MD     [x]Neuro surg-Dr. Susie Romero MD    Significant Inpatient Procedures:   -Bronchoscopy performed on 2021 by Dr. Saul Quispe MD  -Right-sided frontal EVD placement along with a right-sided frontoparietal craniotomy and evacuation of a large frontoparietal parenchymal hemorrhage by Dr. Joanna Bazan Lala Bentley MD on 10 January 2021.  -Tracheal intubation was performed on 10 January 2021 by Dr. Freedom Meadows MD  -Central venous line catheter placement on 10 January 2021by Dr. Freedom Meadows MD  -Cardiac catheterization performed on 9 January 2021 by Dr. Osmar Dubon MD    Objective:   Physical Exam:    see Progress Note dated day of discharge I.e 1/16/2021    Diagnostic Data:  No results found for: PHART, WYB2IHE, PO2ART, OAP8HXA, N1SOALDS  WBC   Date Value Ref Range Status   01/16/2021 7.5 4.8 - 10.8 thou/mm3 Final     Hemoglobin   Date Value Ref Range Status   01/16/2021 9.1 (L) 14.0 - 18.0 gm/dl Final     Hematocrit   Date Value Ref Range Status   01/16/2021 28.6 (L) 42.0 - 52.0 % Final     Platelets   Date Value Ref Range Status   01/16/2021 148 130 - 400 thou/mm3 Final     Sodium   Date Value Ref Range Status   01/16/2021 150 (H) 135 - 145 meq/L Final     Potassium   Date Value Ref Range Status   01/16/2021 3.9 3.5 - 5.2 meq/L Final     Potassium reflex Magnesium   Date Value Ref Range Status   01/08/2021 3.9 3.5 - 5.2 meq/L Final     Chloride   Date Value Ref Range Status   01/16/2021 115 (H) 98 - 111 meq/L Final     CO2   Date Value Ref Range Status   01/16/2021 26 23 - 33 meq/L Final     BUN   Date Value Ref Range Status   01/16/2021 17 7 - 22 mg/dL Final     CREATININE   Date Value Ref Range Status   01/16/2021 0.9 0.4 - 1.2 mg/dL Final     Glucose   Date Value Ref Range Status   01/16/2021 112 (H) 70 - 108 mg/dL Final     Magnesium   Date Value Ref Range Status   01/16/2021 2.4 1.6 - 2.4 mg/dL Final     Comment:     Performed at [facility], [address]     Phosphorus   Date Value Ref Range Status   01/16/2021 4.4 2.4 - 4.7 mg/dL Final     Comment:     Performed at [facility], [address]     Calcium   Date Value Ref Range Status   01/16/2021 8.7 8.5 - 10.5 mg/dL Final     Comment:     Performed at [facility] [address] [facility], [place] [address]     AST   Date Value Ref Range Status   01/16/2021 51 (H) 5 - 40 U/L Final     ALT   Date Value Ref Range Status   01/16/2021 14 11 - 66 U/L Final     Total Bilirubin   Date Value Ref Range Status   01/16/2021 0.3 0.3 - 1.2 mg/dL Final     Alkaline Phosphatase   Date Value Ref Range Status   01/16/2021 30 (L) 38 - 126 U/L Final     Lipase   Date Value Ref Range Status   01/07/2021 53.6 (H) 5.6 - 51.3 U/L Final     Comment:     Performed at [address] [place] [address], [address]     No results found for: CKTOTAL, CKMB, TROPONINI  No results found for: BNP  INR   Date Value Ref Range Status   01/16/2021 1.25 (H) 0.85 - 1.13 Final     Comment:     ---------INDICATION-----------------------INR Reference Range  DVT, PE, AF, AMI, tissue heart valve          2.0 to 3.0  Mechanical prosthetic valves                  2.5 to 3.5  Performed at [address] [place] [id number]       Lactic Acid   Date Value Ref Range Status   01/16/2021 0.5 0.5 - 2.2 mmol/L Final     Comment:     Performed at [address] [place] [address], [address]     Hemoglobin A1C   Date Value Ref Range Status   01/07/2021 5.5 4.4 - 6.4 % Final       Significant Diagnostic Studies:    · CT scan of chest with contrast performed today:  Sat Jan 16, 2021  4:46 AM   CT of the chest after administration of IV contrast.   Impression: Low lung volumes with areas of subsegmental atelectasis. CAT scan of abdomen and pelvis with contrast:  CT of the abdomen and pelvis after administration of IV contrast.   Impression: Multifocal scarring of the right kidney.      Ultrasound negative so far including molecular panel for pneumonia by PCR. · Bio fire negative. · MRI 1/13 showed: Wed Jan 13, 2021  2:55 PM   PROCEDURE: MRI BRAIN WO CONTRAST   1. Extensive intraventricular hemorrhage in the right and left lateral ventricles, third ventricle and fourth ventricle.  Parenchymal hemorrhage in the right frontal and parietal lobes and subarachnoid hemorrhage over the right frontal lobe. 2. Intraventricular shunt entering the calvarium in the right frontal region with tip in the frontal horn of the right lateral ventricle. Craniotomy involving the right frontal calvarium 3. Second shunt entering the calvarium in the right parietal region. 4. Extensive areas of restricted diffusion throughout the right frontal, parietal, temporal and occipital lobes, left frontal and temporal lobes and adjacent to the sylvian fissure to a lesser extent left parietal lobe and in the splenium of the corpus callosum suggestive of areas of infarction. 5. Mass effect upon the right lateral ventricle and midline deviation. 6. Increased fluid in the scalp especially over the right frontal and temporal left posterior temporal regions. 7. Inflammatory changes in ethmoid air cells, maxillary sinuses, sphenoid sinuses and mastoid air cells bilaterally. 8. Increased signal intensity in the nasopharynx. Total time spent: 35minutes  Carolynn Landry MD 1/16/2021 5:41 PM    CC:  No primary care provider on file.

## 2021-01-16 NOTE — PROGRESS NOTES
Pt assessment completed, see flowsheet, Leidy Flowers from [address] updates RN and the aprox time for DCD, RN notifies house supervisior to updated the OR team about DCD event happening sometime this afternoon, but before 2000 pm per familys request.  RN also notifies ICU charge RN    [address] informs RN that he was unable to find any matches for this pts organs, he is aborting this pt unable to continue for the DCD. House supervisor updated,      [phone]  pts sons tearful at bedside, RN offers support, chairs , conference room and refreshment cart. 1245  pts wife arrives crying on arrival RN hugs her , takes her hand and goes into the pts room with her. 1310  More family arrive RN makes sure they are comfortable in the conference room     1338  Dr Eliza Urbina arrives talks about this case with pts RN. RN waiting for more family to arrive and for wife to tell RN when the family is ready for terminal extubation     [phone]  RN updates resp about pts extubation     1430 more family into the pts room, pts wife tells RN that maybe around 0 for extubation, and his mother does not want to come in for the extubation, RN assures her that what ever she is comfortable with is fine,  Dr Eliza Urbina in the room offering comfort and talking with the family and wife,     1500  pts mother at the bedside, tearful talking to her son, lies her head upon his chest. Family members comfort her     1600 ETT, NGT removed mouth care given, much suction needed    5 wife and family at bedside crying, holding pts hand, RN stays with family till pt passes comfort given     1638  No heart beat auscultated, pupils fixed and dilated no corneal reflexes, advised family pt has passed.      [address] Dr Roe Friend made aware of pts TOD    18  Dr Gee Mishra notified of [address] arrives to assist family with  information     [phone]  Family remains in the room with the pt,     Marysol Vital wife leaves \" carol so tired\"     1805  Rest of the family leaves the room, post mortum care to begin     [facility] care completed, called for transport to take body to the [facility] arrives

## 2021-01-17 LAB — URINE CULTURE, ROUTINE: NORMAL

## 2021-01-21 LAB
BLOOD CULTURE, ROUTINE: NORMAL
BLOOD CULTURE, ROUTINE: NORMAL

## 2021-01-25 NOTE — ADT AUTH CERT
right    frontal/parietal lobes with new right to left midline shift of 3 mm.     There is now enlargement of the left lateral ventricle likely representing    a component of obstructive hydrocephalus. 2.  Interval development of intraventricular hemorrhage within the    lateral, third and fourth ventricles. 3.  Interval increase in the amount of subarachnoid hemorrhage along the    superior right frontal lobe.       Critical Care Consult--CC consult--Assessment and Plan:       1. Acute hypoxic respiratory failure: Requiring 2 L nasal cannula, wean as tolerated.  Patient has high probability of intubation. 2. Hemorrhagic stroke: Code stroke was called. [facility] was reviewed by Dr. Marta Mejia of tele stroke.  Recommendations were for platelets and DDAVP along with protamine reversal.  3% saline was started per Dr. Caden Hill recommendations. Repeat CT head in 4 hours   3. Hypertensive emergency: Cardene drip initiated.  Goal blood pressure less than 140.  Appropriate for rapid reduction of blood pressure due to hemorrhagic stroke.  Discussed with Dr. Ankush Duggan. 4. CAD: Multivessel disease on PCI 1/9, patient was consulted to [facility] who plans for CABG.  This will be complicated by hemorrhagic stroke 1/10. ASA stopped, 1/10 due to #2. 5. DVT hx: chronically on coumadin, started on Lovenox 1/9. Stopped secondary to #2  6. Macrocytic anemia: H/H 13.9/41.6  7. Obesity: known   8. Reformed smoker: history   9. Hx CVA: previous CVA, no deficients      Chest--  1.  Endotracheal tube terminates properly 3.9 cm above the joy. 2.  Enteric tube (OG/NG) extends into the abdomen with tip and sidehole    outside the field of view.    3.  Low lung volumes.      Abd x-ray--  1.  Enteric tube (NG/OG) tip and sidehole overlie the expected location of    the stomach body.      Ekg--Inferior-posterior infarct Possibly acute  Marked ST abnormality, possible lateral subendocardial injury  ** ** ACUTE MI / STEMI ** **  Consider right ventricular involvement in acute inferior infarct  Abnormal ECG  Confirmed         NS Consult--PLAN:     · Medical management per ICU team.  · Patient intracranial bleed most likely due to hypertensive type of a bleed. 3% hypertonic saline. · Seizures precaution. · Keep systolic blood pressure less than 140 and above 100. · Stat coagulation profile and platelet mapping. · Transfuse the patient with 1 unit of platelet. · Decision making:  - Based on patient's current neurological status and the findings of his brain CTs, I recommended for the patient an emergency surgical intervention mainly in the form of right craniotomy and evacuation of patient right  sided  intraparenchymal hemorrhage plus placement of right-sided EVD. -Above recommendation discussed with other treatment team (the ICU) team and they are in agreement.  Also I discussed with this option with patient family (wife and children) in detail and explained to them the associated risk and benefit and realistic expectation of this option.  - I spoke with patient's complex detail the above highlighted surgical treatment option.  - I discussed with them in detail the associated risks and benefits.  - I discussed with her the alternative treatment approaches at this time including not to do any surgical intervention.  - I have I emphasized to the patient's family that there is no guarantee that how much the surgery will improve patient current neurological status.  -I emphasized to the patient's wife that the main goal of surgery of life saving and not to save the quality of life.  I told her that it is impossible at this point to predict the degree of patient neurological deficit if he survives.   -Told them that patient current condition is very critical and the prognosis is poor in general given patient current regimen radiological features of his intracranial bleed and his current surgical status, in addition to the fact that he has significant underlying cardiac issue and the fact that he is currently on aspirin.  -The discussion was carried out in front of neurosurgery Leann Wilson an ICU nurse practitioner(Mirian). All questions and concerns were answered and addressed. - Patient's wife elected to proceed with the proposed surgical surgical intervention (right craniotomy and evacuation of right  sided intraparenchymal hematoma  post placement right-sided EVD   -The plan now for patient is to take him to surgery as soon as possible.     To surgery:  Date of Procedure: 1/10/2021     Pre-operative Diagnosis:  · Intraparenchymal right frontoparietal hemorrhage. · Cerebral intraventricular hemorrhage. · Acute hydrocephalus. · Rapid decline in the level of consciousness and neurological status. · Advanced cardio vascular disease (CAD).    Post-operative Diagnosis:      The same.     PROCEDURE:     1- Right-sided frontal EVD placement. 2- Right-sided frontoparietal craniotomy and evacuation of large frontoparietal parenchymal hemorrhage     Anesthesia: General endotracheal     Surgeon: Stephan Jennings MD   Assistant: Tim Arreola PA-C     Estimated Blood Loss: 500 ml     Drains: Right frontal EVD and had another posterior drain placed in the surgical cavity.     Blood Transfusions: None      Complications: none immediately appreciated     Specimens: From right-sided intraparenchymal hematoma.     Indications for Procedure:      -This is a 57-year-old male with acute right-sided cerebral intraparenchymal and intraventricular bleed ventricular hemorrhage associated with significant decline in his neurological status. Patient has other significant comorbidity (advanced multiple vessels CAD).    - Based on patient's current neurological status and the findings of his brain CTs, I recommended for the patient an emergency surgical intervention mainly in the form of right craniotomy and evacuation of patient right  sided  intraparenchymal hemorrhage plus placement of right-sided EVD. -The above neurosurgical recommendations were discussed with other treatment team (the ICU) team and they are in agreement.  Also I discussed these recommendations patient family (wife and children) in detail and explained to them the associated risk and benefit and realistic expectation.  - I spoke with patient's  family in detail the above highlighted surgical recommendation  - I discussed with them in detail the associated risks and benefits.  - Patient's wife elected to proceed with the proposed surgical surgical intervention (right craniotomy and evacuation of right  sided intraparenchymal hematoma  post placement right-sided EVD         PROCEDURE:      (Note: 2 procedures were performed in the same time:  · Right-sided frontal EVD placement. · Right-sided frontoparietal craniotomy and evacuation of large frontoparietal parenchymal hemorrhage  The below dictation are for these 2 procedures in detail).     Patient was brought to the OR. He was placed  supine . Patient was already intubated. General anesthesia was inducted.  Then, IV lines, A-line  and Macias catheter were inserted and maintained.  Next, place patient head in and donut headrest.   We turned the patient head to the left side and we applied shoulder and roll under patient right shoulder. Then, we proceeded with marking the skin incision (was a linear skin incision centered over the right  frontal parietal.   Next, we draped and prepped patient in standard fashion. After that we started with making the skin incision. We dissected the soft tissue under the skin and the muscles till we exposed the bone.  Then, the surgery was conducted in 2 stages:     Stage 1: EVD placement:     About,  3 cm lateral to midline and 1 cm anterior to coronal suture. Then a roge hole was made. Next , the dura was incised and coagulated. An external ventricular catheter was inserted.  A CSF flow was observed from the first pass. Then the catheter was advanced till 7cm. Following that, the catheter was  tunneled and  secured by silk suture. The catheter was lost initially and it was connected to EVD collected system at the end of the surgery.     Stage 2: Right-sided frontoparietal craniotomy and evacuation of large frontoparietal parenchymal hemorrhage     After exposing the bone of the skull in the right frontoparietal area.  I made several bur holes to create a bone flap that centered on the area of the intraparenchymal hematoma that reaches the brain surface to avoid any further damage to the brain tissue.     Then the bone flap was created using the high speed drill (over the right frontoparietal area centered over the location of the hematoma).  Then, we elevated the bone flap.  Then we exposed the dura and did some coagulation fo the dura. Next, we proceeded with opening the dura in parallel in a cruciate fashion.   After we open the dura we noticed some area of abnormal brain brain cortex with blood clot that coming through some area of the brain cortex .  Next, I used a combination of microsurgical techniques, sections of different of small size and bipolar to remove the clots of hematoma through the the area that the intraparenchymal hematoma reach the surface of the brain into I avoid any further damage to the brain tissue.  After I reached the point that I thought that I removed what appeared to be at least near total removal of the of the hematoma. I proceeded with performing a meticulous hemostasis.      As I was satisfied with the hemostasis, I applied a small catheter to serve as a drain inside the surgical cavity to drain any accumulation blood oozing later given the fact the patient is on aspirin. Then, as the irrigated  Fluid kept returning back as a clear fluid and no obvious bleed, I proceeded  with closing the dura with applying a large piece DuraGen.  Then,  I  supported it with a  A layer of  AVISSEEL.  Next, we returned back the bone flap and I  secured the bone flap with screws and plates.  We did a meticulous  irrigation again.  After that we closed the muscles and the wound in a standard  fashion.    The patient tolerated the procedure very well without  intraoperative complications.  At the end of the surgery, patient was  was kept intubated. Then patient was transferred to [facility] [place] further observation and treatment.         Post surgical CT head--  1. Status post surgical evacuation of the hematoma in the right frontoparietal location. 2. Interval development of loss of the gray-white matter differentiation in the posterior right frontal lobe and the right parietal lobe consistent with an infarct. 2. Moderate hydrocephalus with a ventriculostomy catheter in place. There is moderate intraventricular blood.      Repeat CT head--  1.  Vasogenic edema with loss of gray-white matter differentiation within    the right superior frontal lobe appears more confluent than on prior    imaging, consistent with developing infarct. 2.  Decreased pneumocephalus. 3.  Stable right to left midline shift by 6 mm.  Stable interventricular,    intraparenchymal and subarachnoid hemorrhage. 4.  Interval increase in scalp edema/hematoma overlying the superior    frontal calvarium.      Meds--ancef iv x 1,  DDAVP sc x 1,  amidate iv x 1,  keppra IV q12hr x 1, phenergan iv x 1,  propofol 20mg iv x 1,  protamine 50mg iv x 1, IVF NS 75hr,  fentanyl iv gtt, cardene iv gtt,  propofol iv gtt,  sodium chloride 3 % solution   25hr,  apresoline iv prn x 1,  dilaudid iv prn x 1,                1/11  POD 1     Labs--k 3.7--3.3,  cl 112--110,  co2 23--22,  anion gap 9--7,  ion ca 0.99--1.09,  gfr 77--77,  glucose 148--193,  ca 7.7--8.1,  rbc 3.20,  10.3/31.3,  INR 1.3,       Chris Carotid--  1.  There is mild hypoechoic and hyperechoic plaque at the junction of the right carotid bulb and proximal right internal carotid artery with grayscale and color imaging and flow velocities indicating less than 50% stenosis.       2. There is no hemodynamically significant stenosis.        Ekg--Normal sinus rhythm  Marked ST abnormality, possible inferior subendocardial injury  Marked ST abnormality, possible anterolateral subendocardial injury  Prolonged QT interval or tu fusion, consider myocardial disease, electrolyte imbalance, or drug effects  Abnormal ECG  When compared with ECG of 11-JAN-2021 17:07,  Nonspecific T wave abnormality now evident in Inferior leads  QT has lengthened  Confirmed     Meds--ca gluconate iv x 2,  ancef iv x 3,  ISS q6hr,  keppra iv x 2,  prededex iv gtt,  versed iv gtt,  cardene iv gtt,  levophed iv gtt, propofol iv gtt, nacl 3% 25hr,  fentanyl prn x 1, kcl iv prn x 2,            1/12  POD 2        Labs--glucose 186--170--172--151--149--146--296--303,  rbc 3.19,  10.3/31.3,  INR 1.17,       CT head--  1. No significant interval change in postoperative changes with pneumocephalus and subarachnoid blood in the right frontal parietal lobe towards the vertex. 2. Moderate amount of intraventricular hemorrhage with dilatation of the temporal horns similar in appearance to prior study. There is a 7 mm right to left midline shift, similar to prior. 3. Evolving right frontal parietal infarction.    Continued close follow-up is advised.      Eeg--This is an abnormal EEG due to presence of occasional left  frontal sharp waves that are of questionable significance though they  may be epileptogenic in nature, indicates seizure tendency in the proper  clinical context.  In addition, slowing is seen over the right  hemisphere suggestive of an area of focal cortical dysfunction, probably  related to the reported right hemisphere intraparenchymal hematoma  though clinical correlation is necessary.  No clinical seizures were  Observed.     Meds--ancef iv x 3,  ISS q6hr,  keppra iv x 2,  mannitol 20% iv x 1,  versed if gtt, cardene iv gtt, levophed iv gtt, propofol iv gtt, nacl 3% iv 25hr--dc'd 0146,  fentanyl iv prn x 3,  apresoline iv prn x 1, dilaudid iv prn x 1,  kcl iv prn x 1,                1/13  POD 3     101.7 (38.7)   25   82   122/78   149/61      30   --   95   Ventilator      102.2 (39)   26   89   131/82   158/60      102.2 (39)   25   97   136/82   158/60   --   30   --   98   Ventilator      102.2 (39)   25   97   136/82   158/60   --   30   --   98   Ventilator      100.2 (37.9)   26   103   128/82   131/66      99.9 (37.7)   25   101   130/81   136/66   --   30   --   93      Labs--cl 112,  co2 22,  gfr 77,  glucose 149--161--307--120--306,  rbc 3.37,  10.9/32.9,  INR 1.19,          Neurology Consult--Reason for Consult:  Evaluate for ICH, abnormal EEG. This is a 57-year-old male who initially presented to [facility] on 1/7/[facility] for chest pain.  On 1/9 he did undergo heart cath that showed multivessel CAD. [facility] subsequently developed left-sided weakness, facial droop.  He had hypertensive emergency with SBP> 200, he underwent CT as well as CTA head and neck.  CT head showed right frontal, parietal ICH, that is managed by neurosurgery and ICU team. [facility] 1/10/2021 he underwent a right craniotomy with evacuation of the right intraparenchymal hematoma with placement of right decompressive cranial drain in intracranial pressure monitor. [facility] had an EEG done earlier today that was abnormal showing occasional left frontal sharp waves that may be epileptogenic in nature, indicates seizure tendency in the proper clinical context.  Neurology was consulted regarding EEG results.  He is on Keppra 1000 mg IV every 12 hours, no clinical seizures were observed and the patient remains sedated.  The patient had the EVD, and is on 3% saline, SBP is kept below 160, and is on Keppra is being managed by neurosurgery as well as ICU team.  Prognosis is guarded.  After detailed discussion with patient's wife and daughter at bedside we agreed on the following plan.     Recommendations:                                               1. CT head WO contrast, reviewed  2. EEG reviewed showing left frontal sharp waves, no clinical seizure was observed and the patient remains sedated, would continue with Keppra 1000 mg IV every 12 hours  3. Seizure precautions  4. ICH being managed by neurosurgery and ICU team  5. DVT prophylaxis  6. Prognosis guarded  7. Discussed with primary service. 8. Family updated at bedside.   9. Please call if any questions.               CT head--  1.  Interval decrease in the amount of blood identified within the fourth    ventricle.  Otherwise stable appearance of the brain.      MRA Head--  1.  Interval decrease in the amount of blood identified within the fourth    ventricle.  Otherwise stable appearance of the brain.      MRI brain--  1. Extensive intraventricular hemorrhage in the right and left lateral ventricles, third ventricle and fourth ventricle. Parenchymal hemorrhage in the right frontal and parietal lobes and subarachnoid hemorrhage over the right frontal lobe. 2. Intraventricular shunt entering the calvarium in the right frontal region with tip in the frontal horn of the right lateral ventricle. Craniotomy involving the right frontal calvarium   3. Second shunt entering the calvarium in the right parietal region. 4. Extensive areas of restricted diffusion throughout the right frontal, parietal, temporal and occipital lobes, left frontal and temporal lobes and adjacent to the sylvian fissure to a lesser extent left parietal lobe and in the splenium of the corpus    callosum suggestive of areas of infarction. 5. Mass effect upon the right lateral ventricle and midline deviation. 6. Increased fluid in the scalp especially over the right frontal and temporal left posterior temporal regions. 7. Inflammatory changes in ethmoid air cells, maxillary sinuses, sphenoid sinuses and mastoid air cells bilaterally.    8. Increased signal intensity in the nasopharynx.      Meds--ancef iv x 3,  ISS q 6hr,  keppra iv x 2,  fentanyl iv gtt,  mannitol IV x 3,  versed iv gtt,  cardene iv gtt, levophed iv gtt,  propofol iv gtt, nacl 3% IV 20hr,  fentanyl iv prn x 1,            1/14  POD 4     99.1 (37.3)   25   96   119/84   131/65   --   30   --   94      98.1 (36.7)   27   99   125/84   149/68   --   45   --   92      98.6 (37)   14   70   100/74   129/61   --   40   --   96      100.4 (38)   16   83   107/72   141/59   Semi fowlers   30   --   98   Ventilator      Labs--na 147,  cl 113,  anion gap 7,  gfr 87,  glucose 145--111-146--307--120--314--145--126,  rbc 3.25,  10.5/32.9,  INR 1.20,       Chest--  . Poor inflation lungs. Normal heart size. ET tube, NG tube, and right jugular line all appear to be in good position. 2. Moderate left basilar atelectasis/pneumonia. Mild infiltrate along right hemidiaphragm. 3. Soft tissue density along the lateral aspect of the right hemithorax, likely representing pleural thickening.  There are several old healed rib fractures right side and a few on the left.      Meds--ancef iv x 3,  ISS q6hr,  keppra IV x 2,  fentanyl iv gtt,  mannitol iv x 3,  cardene iv gtt, levophed iv gtt,  propofol iv gtt, nacl 3% 20hr IV,                   1/15  POD 5     100 (37.8)   18   85   110/73   129/60   Semi fowlers   30   --   98   Ventilator      100.4 (38)   26   91   122/79   156/65   Semi fowlers   30   --   96   Ventilator      99.9 (37.7)   14   84   111/78   138/65   Semi fowlers   30   --   98        99.9 (37.7)   31   78   126/78   155/72   Semi fowlers   30   --   100        99.7 (37.6)   13   80   126/88   156/74      Labs--na 149--151--147,  cl 116--113,  anion gap 7--7,  gfr 87--87,  glucose 132--126--128--114--122--117,  total protein 5.8, ,  alb 2.9,  alk phos 29,  ast 53,  bili 0.2,  rbc 2.83,  8.9/28.2,  INR 1.21,       Blood cx x 2,     Urine cx sent,     UA blood trace,  protein trace,       pH, Blood Gas 7.35 - 7.45 7.44    PCO2 35 - 45 mmhg 39    PO2 71 - 104 mmhg 62Low     HCO3 23 - 28 mmol/l 27    Base Excess (Calculated) -2.5 - 2.5 mmol/l 2.3    O2 Sat % 92    IFIO2   25    DEVICE   Adult Vent    Mason Test   N/A    Source:   Art Line          Meds--ancef iv x 3,  ISS q6hr,  keppra iv x 2,  zosyn iv x 1,  fentanyl iv gtt,  mannitol iv x 3,  levophed iv gtt,  propofol iv gtt, cardene iv gtt, fentanyl iv prn x 2,        Chest Pain - Care Day 2 (1/8/2021) by Yehuda Wooten RN       Review Status Review Entered   Completed 1/11/2021 09:50      Criteria Review      Care Day: 2 Care Date: 1/8/2021 Level of Care: Inpatient Floor    Guideline Day 2    Clinical Status    (X) * Hemodynamic stability    1/11/2021 9:49 AM EST by Aurea Hurt      BP (!) 148/97   Pulse 67    ( ) * Acute coronary syndrome ruled out    1/11/2021 9:49 AM EST by Ray García      stress test showing invasive amounts of ischemia today. Will make NPO after midnight for [facility] tomorrow morning    (X) * Pain absent or managed    (X) * Dangerous arrhythmia absent    1/11/2021 9:49 AM EST by Ray García      absent    ( ) * No identification of etiology of pain requiring inpatient care    1/11/2021 9:49 AM EST by Ray García      stress test showing invasive amounts of ischemia today. Will make NPO after midnight for [address] tomorrow morning    ( ) * Discharge plans and education understood    Activity    (X) * Ambulatory or acceptable for next level of care    1/11/2021 9:49 AM EST by Ray García      aat    Routes    (X) * Oral hydration, medications, and diet    1/11/2021 9:49 AM EST by Ray García      DIET GENERAL;  Diet NPO, After Midnight    Interventions    (X) Possible stress test or cardiac catheterization    1/11/2021 9:49 AM EST by Ray García      stress test showing invasive amounts of ischemia today.  Will make NPO after midnight for [facility] tomorrow morning    Medications    (X) Possible aspirin or cardiac medications    1/11/2021 9:49 AM EST by Alexa Washington      warfarin 6 mgOralOnce  metoprolol tartrate 25 mgOralBID  aspirin 81 mgOralDai    * Milestone   Additional Notes   1/8       Vs- BP (!) 148/97   Pulse 67   Temp 97.8 °F (36.6 °C) (Oral)   Resp 16   Ht 6' 2\" (1.88 m)   Wt 271 lb (122.9 kg)   SpO2 97%      Labs   1/8/2021 03:30   Sodium: 141   Potassium: 3.9   Chloride: 107   CO2: 23   BUN: 17   Creatinine: 1.1   Anion Gap: 11.0   Est, Glom Filt Rate: 69 (A)   Glucose: 146 (H)   Calcium: 8.9   Total Protein: 6.2   Albumin: 3.8   Alk Phos: 26 (L)   ALT: 70 (H)   AST: 40   Bilirubin: 0.4   WBC: 4.8   RBC: 4.44 (L)   Hemoglobin Quant: 14.2   Hematocrit: 42.8   MCV: 96.4 (H)   MCH: 32.0   MCHC: 33.2   MPV: 10.8   RDW-CV: 13.0   RDW-SD: 46.1 (H)   Platelet Count: 153   INR: 1.84 (H)      1/8/2021 15:00   COVID-19: Rpt   SARS-CoV-2, NAAT: NOT DETECTED         1/8/2021 09:00   STRESS TEST   Summary    Moderate to severe inferior, septal, and anterior wall defects with almost    complete loss of the apex suggestive of multivessel CAD ischemia (LAD and    RCA)    Preserved LVEF        Recommendation    Invasive ischemia workup is recommended if clinically indicated.    Aggressive risk factor management. 1/8/2021 12:26   VL LOWER EXTREMITY BILATERAL VENOUS 1. Reflux in the popliteal and peroneal veins bilaterally, indicative of valvular insufficiency. 2. Mild chronic mural thickening in the right popliteal vein. 3. No evidence for an acute or occlusive process.       1/8/2021 15:38   ECHOCARDIOGRAM TRANSESOPHAGEAL    Summary    Ejection fraction was estimated at 55-60%.  ATRIAL SEPTUM: + PFO with R to L shunting with increased intracardiac    pressures.    There was trace mitral regurgitation.             Per IM   Subjective: no acute events overnight. No chest pain this morning, however after his stress test did develop some chest pain which resolved shortly after.  No fevers, chills, sob, nausea, vomiting, abdominal pain. Assessment/Plan:    1. Chest Pain concerning for CAD/Stable Angina: stress test showing invasive amounts of ischemia today. Will make NPO after midnight for [facility] tomorrow morning. Cardiology following. ASA, Statin, and BB.        2. Sinus bradycardia   Mild (high 50s) / likely iatrogenic secondary to outpatient BB use. BB resumed with hold parameters / may need down-titrated. Maintain telemetry.       3. Macrocytosis without anemia   Etiology unclear. Denies alcohol use. Check B12/folate.       4. Mild hepatocellular transaminitis   Etiology unclear. No associated abdominal pain. Check hepatitis panel. HFP daily. Consider liver US if liver injury worsens/fails to improve.       5. Chronic HTN   Controlled. BB resumed. Monitor BP.       6. Hx CVA   Not currently on statin therapy. ASA initiated. Lipid panel pending.       7. Remote hx LLE DVT (anticoagulated on coumadin)   See HPI below. Supratherapeutic on arrival (3.14). No active bleeding assessed. PT/INR daily. Pharmacy to dose coumadin. May need transitioned to heparin gtt pending ongoing clinical course.       8. Obesity (BMI 34.7)    on weight loss, healthy diet/exercise.       9. Former tobacco abuse   Quit . Noted. Encourage ongoing abstention. Scheduled Medications   · warfarin 6 mg Oral Once   · midazolam       · fentaNYL       · influenza virus vaccine 0.5 mL Intramuscular Prior to discharge   · metoprolol tartrate 25 mg Oral BID   · aspirin 81 mg Oral Daily         NPO after midnight for [facility] tomorrow morning      Physician Advisor Recommendation by Minh Reyez RN       Review Status Review Entered   In Primary 2021 17:48      Criteria Review   We recommend that the following pt's current hospitalization under INPATIENT status is APPROPRIATE .        Name: Kala Carrasquillo   : 1965   CSN: [id number]   INSURANCE: [facility]        Clinical summary Chest pain with heart score of 5  Vitals stabilizing  Labs and Imaging stabilizing  MCG criteria applies yes  Comments

## (undated) DEVICE — AGENT HEMSTAT W2XL4IN OXIDIZED REGENERATED CELOS ABSRB SFT

## (undated) DEVICE — CATHETER EVD L35CM LUMN ID1.5MM DEPTH MRK 3-15CM FOR EXT

## (undated) DEVICE — MAYFIELD® DISPOSABLE ADULT SKULL PIN (PLASTIC BASE): Brand: MAYFIELD®

## (undated) DEVICE — 1.0MM TAPERED ROUND

## (undated) DEVICE — CODMAN® SURGICAL PATTIES 1/2" X1 1/2" (1.27CM X 3.81CM): Brand: CODMAN®

## (undated) DEVICE — CODMAN® SURGICAL PATTIES 1/2" X 1/2" (1.27CM X 1.27CM): Brand: CODMAN®

## (undated) DEVICE — GLOVE ORANGE PI 7 1/2   MSG9075

## (undated) DEVICE — TOWEL,OR,DSP,ST,BLUE,STD,4/PK,20PK/CS: Brand: MEDLINE

## (undated) DEVICE — BATTERY DRVR FOR INTELLIGENT SYS

## (undated) DEVICE — SUTURE NRLN 1-0 L30IN NONABSORBABLE BLK CT-1 L36MM 1/2 CIR 5425H

## (undated) DEVICE — SUTURE ETHLN SZ 2-0 L30IN NONABSORBABLE BLK L36MM FSLX 3/8 1674H

## (undated) DEVICE — SCALPFIX STERILE: Brand: AESCULAP

## (undated) DEVICE — CORD,CAUTERY,BIPOLAR,STERILE: Brand: MEDLINE

## (undated) DEVICE — AGENT HEMOSTATIC SURGIFLOW MATRIX KIT W/THROMBIN

## (undated) DEVICE — BUR SURG DIA3MM DMND RND 5 STP NOTCH EXPOSE MRK ELITE 582001230] STRYKER CORP]

## (undated) DEVICE — SUTURE SILK 2-0 CP-1 30IN N ABSRB BRAID BLK 443H

## (undated) DEVICE — SUTURE NRLN SZ 4-0 L18IN NONABSORBABLE BLK L17MM RB-1 1/2 C554D

## (undated) DEVICE — TTL1LYR 16FR10ML 100%SIL TMPST TR: Brand: MEDLINE

## (undated) DEVICE — SUTURE ETHLN SZ 3-0 L30IN NONABSORBABLE BLK FSL L30MM 3/8 1671H

## (undated) DEVICE — GLOVE ORTHO 8   MSG9480

## (undated) DEVICE — ACCUDRAIN® EXTERNAL CSF DRAINAGE SYSTEM WITH ANTI-REFLUX VALVE: Brand: ACCUDRAIN®

## (undated) DEVICE — GLOVE SURG SZ 8 L12IN THK75MIL DK GRN LTX FREE

## (undated) DEVICE — FISH HOOKS, W/SUTURE, RUBBER BAND BLUNT, BARBLESS: Brand: DEROYAL

## (undated) DEVICE — 3.0MM COARSE DIAMOND NEURO (MATCH HEAD)